# Patient Record
Sex: MALE | Race: WHITE | Employment: FULL TIME | URBAN - METROPOLITAN AREA
[De-identification: names, ages, dates, MRNs, and addresses within clinical notes are randomized per-mention and may not be internally consistent; named-entity substitution may affect disease eponyms.]

---

## 2017-02-21 ENCOUNTER — OFFICE VISIT (OUTPATIENT)
Dept: FAMILY MEDICINE | Facility: CLINIC | Age: 42
End: 2017-02-21
Payer: COMMERCIAL

## 2017-02-21 VITALS
BODY MASS INDEX: 34.58 KG/M2 | TEMPERATURE: 97.8 F | OXYGEN SATURATION: 97 % | WEIGHT: 247 LBS | HEIGHT: 71 IN | DIASTOLIC BLOOD PRESSURE: 81 MMHG | SYSTOLIC BLOOD PRESSURE: 121 MMHG | HEART RATE: 64 BPM

## 2017-02-21 DIAGNOSIS — M21.42 FLAT FEET: ICD-10-CM

## 2017-02-21 DIAGNOSIS — M21.612 BUNION, LEFT: ICD-10-CM

## 2017-02-21 DIAGNOSIS — Z00.00 ROUTINE GENERAL MEDICAL EXAMINATION AT A HEALTH CARE FACILITY: Primary | ICD-10-CM

## 2017-02-21 DIAGNOSIS — Z13.1 SCREENING FOR DIABETES MELLITUS: ICD-10-CM

## 2017-02-21 DIAGNOSIS — Z13.6 CARDIOVASCULAR SCREENING; LDL GOAL LESS THAN 160: ICD-10-CM

## 2017-02-21 DIAGNOSIS — M21.611 BUNION, RIGHT: ICD-10-CM

## 2017-02-21 DIAGNOSIS — M21.41 FLAT FEET: ICD-10-CM

## 2017-02-21 LAB
CHOLEST SERPL-MCNC: 202 MG/DL
GLUCOSE SERPL-MCNC: 93 MG/DL (ref 70–99)
HDLC SERPL-MCNC: 62 MG/DL
LDLC SERPL CALC-MCNC: 126 MG/DL
NONHDLC SERPL-MCNC: 140 MG/DL
TRIGL SERPL-MCNC: 70 MG/DL

## 2017-02-21 PROCEDURE — 99396 PREV VISIT EST AGE 40-64: CPT | Performed by: FAMILY MEDICINE

## 2017-02-21 PROCEDURE — 80061 LIPID PANEL: CPT | Performed by: FAMILY MEDICINE

## 2017-02-21 PROCEDURE — 36415 COLL VENOUS BLD VENIPUNCTURE: CPT | Performed by: FAMILY MEDICINE

## 2017-02-21 PROCEDURE — 82947 ASSAY GLUCOSE BLOOD QUANT: CPT | Performed by: FAMILY MEDICINE

## 2017-02-21 NOTE — PATIENT INSTRUCTIONS
Neuroma of your foot: anti-inflammatories,ice after exercise  You can continue your usual activity.    Health Maintenance Due   Topic Date Due     INFLUENZA VACCINE (SYSTEM ASSIGNED)  09/01/2016

## 2017-02-21 NOTE — PROGRESS NOTES
SUBJECTIVE:     CC: Cristo Dong is an 41 year old male who presents for preventative health visit.   1. Foot pain    Physical   Annual:     Getting at least 3 servings of Calcium per day::  Yes    Bi-annual eye exam::  NO    Dental care twice a year::  Yes    Sleep apnea or symptoms of sleep apnea::  Daytime drowsiness    Diet::  Regular (no restrictions)    Frequency of exercise::  4-5 days/week    Duration of exercise::  30-45 minutes    Taking medications regularly::  Yes    Medication side effects::  Not applicable    Additional concerns today::  YES              Today's PHQ-2 Score:   PHQ-2 ( 1999 Pfizer) 2/21/2017   Q1: Little interest or pleasure in doing things -   Q2: Feeling down, depressed or hopeless -   PHQ-2 Score -   Little interest or pleasure in doing things Not at all   Feeling down, depressed or hopeless Not at all   PHQ-2 Score 0       Abuse: Current or Past(Physical, Sexual or Emotional)- No  Do you feel safe in your environment - Yes    Social History   Substance Use Topics     Smoking status: Never Smoker     Smokeless tobacco: Never Used     Alcohol use 2.0 oz/week      Comment: 4-6 drinks per wk     The patient does not drink >3 drinks per day nor >7 drinks per week.    Last PSA: No results found for: PSA    Recent Labs   Lab Test  04/17/14   0907  03/18/11   1113   CHOL  152  164   HDL  48  50   LDL  84  96   TRIG  101  92   CHOLHDLRATIO  3.2  3.3       Reviewed orders with patient. Reviewed health maintenance and updated orders accordingly - Yes    All Histories reviewed and updated in Epic.  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     Pt was on coumadin for 5 years     Thrombosed hemorrhoids       Past Surgical History   Procedure Laterality Date     C repair cruciate ligament,knee  1991     Right Knee     C nerve graft,hand/foot,one,=<4cm  1990     Nerve Graft - Right Hand     C hand/finger surgery unlisted       RT HAND INDEX FINGER NERVE  DAMAGE       ROS:  C: NEGATIVE for fever, chills, change in weight  I: NEGATIVE for worrisome rashes, moles or lesions  E: NEGATIVE for vision changes or irritation  ENT: NEGATIVE for ear, mouth and throat problems  R: NEGATIVE for significant cough or SOB  CV: NEGATIVE for chest pain, palpitations or peripheral edema  GI: NEGATIVE for nausea, abdominal pain, heartburn, or change in bowel habits   male: negative for dysuria, hematuria, decreased urinary stream, erectile dysfunction, urethral discharge  M: NEGATIVE for significant arthralgias or myalgia  N: NEGATIVE for weakness, dizziness or paresthesias  P: NEGATIVE for changes in mood or affect    BP Readings from Last 3 Encounters:   02/21/17 121/81   09/14/15 124/84   06/30/15 131/85    Wt Readings from Last 3 Encounters:   02/21/17 247 lb (112 kg)   09/14/15 242 lb (109.8 kg)   06/30/15 246 lb 5 oz (111.7 kg)                  Current Outpatient Prescriptions   Medication Sig Dispense Refill     clotrimazole-betamethasone (LOTRISONE) cream Apply topically 2 times daily 30 g 1     ciclopirox 8 % SOLN Externally apply 1 mL topically daily 90 mL 1     ASPIRIN 81 mg. Used to take 1 a day. Now takes 8 a day.       ORDER FOR DME Equipment being ordered: Orthotics, bilateral, flat feet, one pair per year.  Dispense one pair, 1 each 0     Allergies   Allergen Reactions     Codeine GI Disturbance     Recent Labs   Lab Test  06/30/15   0942  04/17/14   0907  03/18/11   1113  03/29/10   1006  07/17/09   1602  03/18/09   0953   LDL   --   84  96  98   --   130*   HDL   --   48  50  48   --   44   TRIG   --   101  92  127   --   133   ALT   --    --   36  29   --   40   CR   --    --    --   0.78   --    --    GFRESTIMATED   --    --    --   >90   --    --    GFRESTBLACK   --    --    --   >90   --    --    POTASSIUM   --    --    --   4.2   --    --    TSH  1.30   --    --    --   1.77   --       OBJECTIVE:     There were no vitals taken for this visit.  EXAM:  GENERAL:  "healthy, alert and no distress  EYES: Eyes grossly normal to inspection, PERRL and conjunctivae and sclerae normal  HENT: ear canals and TM's normal, nose and mouth without ulcers or lesions  NECK: no adenopathy, no asymmetry, masses, or scars and thyroid normal to palpation  RESP: lungs clear to auscultation - no rales, rhonchi or wheezes  CV: regular rate and rhythm, normal S1 S2, no S3 or S4, no murmur, click or rub, no peripheral edema and peripheral pulses strong  ABDOMEN: soft, nontender, no hepatosplenomegaly, no masses and bowel sounds normal  MS: no gross musculoskeletal defects noted, no edema  SKIN: no suspicious lesions or rashes  NEURO: Normal strength and tone, mentation intact and speech normal  PSYCH: mentation appears normal, affect normal/bright  FEET: FEET: bilaterally normal; no swelling, tenderness or skin or vascular lesions.  Sensation is intact except he reports numbness of distal left great toe. Peripheral pulses are palpable, capillary refill brisk. Toenails are normal.     ASSESSMENT/PLAN:     1. Routine general medical examination at a health care facility  [facility]    2. Flat feet  3. Bunion, left  4. Bunion, right  Causing numbness in great toe.  Discussed options (doing nothing, surgery), he will consider      COUNSELING:   See below     reports that he has never smoked. He has never used smokeless tobacco.    Estimated body mass index is 33.99 kg/(m^2) as calculated from the following:    Height as of 6/30/15: 5' 10.75\" (1.797 m).    Weight as of 9/14/15: 242 lb (109.8 kg).   Weight management plan home exercise and diet, recheck 12 months    Counseling Resources:  ATP IV Guidelines  Pooled Cohorts Equation Calculator  FRAX Risk Assessment  ICSI Preventive Guidelines  Dietary Guidelines for Americans, 2010  USDA's MyPlate  ASA Prophylaxis  Lung CA Screening    Keesha Miranda MD  [facility]  Answers for HPI/ROS submitted by the patient on 2/21/2017   PHQ-2 " Depressed: Not at all, Not at all  PHQ-2 Score: 0

## 2017-02-21 NOTE — MR AVS SNAPSHOT
After Visit Summary   2/21/2017    Cristo Dong    MRN: [id number]           Patient Information     Date Of Birth          1975        Visit Information        Provider Department      2/21/2017 8:00 AM Keesha Miranda MD [facility]        Today's Diagnoses     Routine general medical examination at a health care facility    -  1    Flat feet        Bunion, left        Bunion, right          Care Instructions    Neuroma of your foot: anti-inflammatories,ice after exercise  You can continue your usual activity.    Health Maintenance Due   Topic Date Due     INFLUENZA VACCINE (SYSTEM ASSIGNED)  09/01/2016            Follow-ups after your visit        Who to contact     If you have questions or need follow up information about today's clinic visit or your schedule please contact [facility] directly at [phone].  Normal or non-critical lab and imaging results will be communicated to you by MyChart, letter or phone within 4 business days after the clinic has received the results. If you do not hear from us within 7 days, please contact the clinic through MyChart or phone. If you have a critical or abnormal lab result, we will notify you by phone as soon as possible.  Submit refill requests through [employer] or call your pharmacy and they will forward the refill request to us. Please allow 3 business days for your refill to be completed.          Additional Information About Your Visit        MyChart Information     [employer] gives you secure access to your electronic health record. If you see a primary care provider, you can also send messages to your care team and make appointments. If you have questions, please call your primary care clinic.  If you do not have a primary care provider, please call [phone] and they will assist you.        Care EveryWhere ID     This is your Care EveryWhere ID. This could be used by other organizations to access your [place]  "medical records  [id number]        Your Vitals Were     Pulse Temperature Height Pulse Oximetry BMI (Body Mass Index)       64 97.8  F (36.6  C) (Tympanic) 5' 10.5\" (1.791 m) 97% 34.94 kg/m2        Blood Pressure from Last 3 Encounters:   02/21/17 121/81   09/14/15 124/84   06/30/15 131/85    Weight from Last 3 Encounters:   02/21/17 247 lb (112 kg)   09/14/15 242 lb (109.8 kg)   06/30/15 246 lb 5 oz (111.7 kg)              Today, you had the following     No orders found for display       Primary Care Provider Office Phone # Fax #    Keesha Miranda [phone]       [facility] [address]  [facility] [address]        Thank you!     Thank you for choosing [facility]  for your care. Our goal is always to provide you with excellent care. Hearing back from our patients is one way we can continue to improve our services. Please take a few minutes to complete the written survey that you may receive in the mail after your visit with us. Thank you!             Your Updated Medication List - Protect others around you: Learn how to safely use, store and throw away your medicines at www.disposemymeds.org.          This list is accurate as of: 2/21/17  8:21 AM.  Always use your most recent med list.                   Brand Name Dispense Instructions for use    ASPIRIN      81 mg. Used to take 1 a day. Now takes 8 a day.       ciclopirox 8 % Soln     90 mL    Externally apply 1 mL topically daily       clotrimazole-betamethasone cream    LOTRISONE    30 g    Apply topically 2 times daily       order for DME     1 each    Equipment being ordered: Orthotics, bilateral, flat feet, one pair per year. Dispense one pair,         "

## 2017-02-21 NOTE — NURSING NOTE
"Chief Complaint   Patient presents with     Physical       Initial /81 (BP Location: Right arm, Patient Position: Chair, Cuff Size: Adult Large)  Pulse 64  Temp 97.8  F (36.6  C) (Tympanic)  Ht 5' 10.5\" (1.791 m)  Wt 247 lb (112 kg)  SpO2 97%  BMI 34.94 kg/m2 Estimated body mass index is 34.94 kg/(m^2) as calculated from the following:    Height as of this encounter: 5' 10.5\" (1.791 m).    Weight as of this encounter: 247 lb (112 kg).  Medication Reconciliation: complete     Valorie Patterson MA    "

## 2017-02-23 NOTE — PROGRESS NOTES
Hello!  It was a pleasure to see you in clinic!  Thank you for getting labs done.     Your glucose was normal, which is great, you do not have diabetes. Hurray!    Your cholesterol looks pretty good.  Your total cholesterol and LDL, or bad cholesterol, are higher than in previous years, but still pretty much at goal. Ideally, total cholesterol should be less than 200 and yours is at 202. Your LDL is 126, an increase form 84 last year, but still at goal of less than 160. Your HDL is very good, anything over 40 is good news!      TRIGLYCERIDES: Desirable is less than 150.  Yours are very low, so you must not eat a lot of sugars and starches, good job!    As you may know, abnormal cholesterol is one factor that increases your risk for heart disease and stroke. You can improve your cholesterol by controlling the amount and type of fat you eat and by increasing your daily activity level.    Here are some ways to improve your nutrition (adapted from the American Academy of Family Practice handout):  Eat less fat (especially butter, Crisco and other saturated fats)  Buy lean cuts of meat; reduce your portions of red meat or substitute poultry or fish, or avoid meat altogether  Use skim milk and low-fat dairy products  Eat no more than 4 egg yolks per week  Avoid fried or fast foods that are high in fat  Eat more fruits and vegetables  I hope the exercise plan goes well!  All activity is worth it!    Sincerely,  Dr. Keesha Miranda MD  2/22/2017

## 2017-05-26 ENCOUNTER — OFFICE VISIT (OUTPATIENT)
Dept: FAMILY MEDICINE | Facility: CLINIC | Age: 42
End: 2017-05-26
Payer: COMMERCIAL

## 2017-05-26 ENCOUNTER — TRANSFERRED RECORDS (OUTPATIENT)
Dept: HEALTH INFORMATION MANAGEMENT | Facility: CLINIC | Age: 42
End: 2017-05-26

## 2017-05-26 VITALS
RESPIRATION RATE: 16 BRPM | BODY MASS INDEX: 34.4 KG/M2 | HEIGHT: 71 IN | TEMPERATURE: 98.8 F | OXYGEN SATURATION: 98 % | DIASTOLIC BLOOD PRESSURE: 82 MMHG | WEIGHT: 245.75 LBS | SYSTOLIC BLOOD PRESSURE: 138 MMHG | HEART RATE: 68 BPM

## 2017-05-26 DIAGNOSIS — D22.9 ATYPICAL MOLE: ICD-10-CM

## 2017-05-26 DIAGNOSIS — M79.604 RIGHT LEG PAIN: ICD-10-CM

## 2017-05-26 DIAGNOSIS — I26.99 PULMONARY EMBOLISM AND INFARCTION (H): Primary | ICD-10-CM

## 2017-05-26 PROCEDURE — 99214 OFFICE O/P EST MOD 30 MIN: CPT | Performed by: FAMILY MEDICINE

## 2017-05-26 RX ORDER — ASPIRIN 325 MG
325 TABLET, DELAYED RELEASE (ENTERIC COATED) ORAL DAILY
COMMUNITY
Start: 2017-05-26 | End: 2017-05-30

## 2017-05-26 NOTE — MR AVS SNAPSHOT
After Visit Summary   5/26/2017    Cristo Dong    MRN: [id number]           Patient Information     Date Of Birth          1975        Visit Information        Provider Department      5/26/2017 11:40 Gabriel Mckeon MD [facility]        Today's Diagnoses     Atypical mole    -  1    Right leg pain           Follow-ups after your visit        Additional Services     DERMATOLOGY REFERRAL       Your provider has referred you to: FMG: [facility] [phone]   [email or web address]/  FMG: [facility] [place] [phone]   [email or web address].[email or web address]/[email or web address]/  UMP: Dermatology [facility] [phone]   http://[email or web address]/dermatology-clinic/  UMP: [facility] [place] [phone]   http://www.[facility]/vpsle-ernlm-jdzdceh-[employer]/  FHN: [facility] [phone]   [email or web address]  FHN: [facility] [place] [phone]   [email or web address]  FHN: [facility] [place] [phone]   [email or web address]  FHN: [facility] [place] [phone]   [email or web address]  FHN: [facility] [place] [phone]   [email or web address]  FHN: [facility] [place] [phone]   http://[email or web address]  FHN:  Dermatologist - [place] [phone]   [email or web address]  [facility] [place] [phone]   [email or web address]  [facility] [place] [phone]   [email or web address]  [facility] [phone]   [email or web address]  [facility] [phone]   [email or web address]  [place] [facility]  [facility] [phone]   [email or web address]  [facility] [place] [phone]   http://[email or web address]    Please be aware that coverage of these services is subject to the terms and limitations of your health insurance plan.  Call member services at your health plan with any benefit or coverage questions.      Please bring the following with you to your appointment:    (1) Any X-Rays, CTs or MRIs which have been performed.  Contact the facility where they were done to arrange for  prior to your scheduled appointment.  Any new CT, MRI or other procedures ordered by your specialist must be performed at a [place] facility or coordinated by your clinic's referral office.  (2) List of current medications  (3) This referral request   (4) Any documents/labs given to you for this referral                  Who to contact     If you have questions or need follow up information about today's clinic visit or your schedule please contact [facility] directly at [phone].  Normal or non-critical lab and imaging results will be communicated to you by [employer], letter or phone within 4 business days after the clinic has received the results. If you do not hear from us within 7 days, please contact the clinic through [employer] or phone. If you have a critical or abnormal lab result, we will notify you by phone as soon as possible.  Submit refill requests through [employer] or call your pharmacy and they will forward the refill request to us. Please allow 3 business days for your refill to be completed.          Additional Information About Your Visit        [employer] Information     [employer] gives you secure access to your electronic health record. If you see a primary care provider, you can also send messages to your care team and make appointments. If you have questions, please call your primary care clinic.  If you do not have a  "primary care provider, please call [phone] and they will assist you.        Care EveryWhere ID     This is your Care EveryWhere ID. This could be used by other organizations to access your [place] medical records  [id number]        Your Vitals Were     Pulse Temperature Respirations Height Pulse Oximetry BMI (Body Mass Index)    68 98.8  F (37.1  C) (Oral) 16 5' 10.5\" (1.791 m) 98% 34.76 kg/m2       Blood Pressure from Last 3 Encounters:   05/26/17 138/82   02/21/17 121/81   09/14/15 124/84    Weight from Last 3 Encounters:   05/26/17 245 lb 12 oz (111.5 kg)   02/21/17 247 lb (112 kg)   09/14/15 242 lb (109.8 kg)              We Performed the Following     DERMATOLOGY REFERRAL        Primary Care Provider Office Phone # Fax #    Keesha Miranda [phone]       [facility] [address] [facility] [address]        Thank you!     Thank you for choosing [facility]  for your care. Our goal is always to provide you with excellent care. Hearing back from our patients is one way we can continue to improve our services. Please take a few minutes to complete the written survey that you may receive in the mail after your visit with us. Thank you!             Your Updated Medication List - Protect others around you: Learn how to safely use, store and throw away your medicines at www.disposemymeds.org.          This list is accurate as of: 5/26/17 11:57 AM.  Always use your most recent med list.                   Brand Name Dispense Instructions for use    * ASPIRIN      81 mg. Used to take 1 a day. Now takes 8 a day.       * aspirin 325 MG EC tablet      Take 1 tablet (325 mg) by mouth daily       ciclopirox 8 % Soln     90 mL    Externally apply 1 mL topically daily       clotrimazole-betamethasone cream    LOTRISONE    30 g    Apply topically 2 times daily       order for DME     1 each    Equipment being ordered: Orthotics, bilateral, flat feet, one pair per " year. Dispense one pair,       * Notice:  This list has 2 medication(s) that are the same as other medications prescribed for you. Read the directions carefully, and ask your doctor or other care provider to review them with you.

## 2017-05-26 NOTE — PROGRESS NOTES
SUBJECTIVE:                                                    Cristo Dong is a 42 year old male who presents to clinic today for the following health issues:      Low oxygen levels      Duration: 4-5 days    Description (location/character/radiation): climbing upstairs, gets out of breath easily, past few days has had readings from 91-93 for oxygen     Intensity:  mild    Accompanying signs and symptoms: hard time catching breath, feels O2 is lower when lying down, congested    History (similar episodes/previous evaluation): had PE in 2001    Precipitating or alleviating factors: runs and exercise    Therapies tried and outcome: None     2001 - had PE. Was hospitalized.   Sensitive to his body feels.  Keeps himself in good shape.   Running 6-8 miiles per week.  Had plantar fascitis and did not run since last fall.  Trying to get back to exercise.   Now some short of breath with activity.   Has home oxymetry - reading 92-93  Currently using 325mg aspirin as blood thinner.       Also wondering about possible damage from PE.     Problem list and histories reviewed & adjusted, as indicated.  Additional history: as documented    Labs reviewed in EPIC    Reviewed and updated as needed this visit by clinical staff       Reviewed and updated as needed this visit by Provider      Social History     Social History     Marital status:      Spouse name: TODD     Number of children: 2     Years of education: N/A     Occupational History      [employer]     Social History Main Topics     Smoking status: Never Smoker     Smokeless tobacco: Never Used     Alcohol use 2.0 oz/week      Comment: 4-6 drinks per wk     Drug use: No     Sexual activity: Yes     Partners: Female     Birth control/ protection: Condom     Other Topics Concern      Service No     Blood Transfusions No     Exercise No     Seat Belt Yes     Self-Exams Yes     Parent/Sibling W/ Cabg, Mi Or Angioplasty Before 65f 55m? No     Social  "History Narrative    2009    Balanced Diet - Yes    Osteoporosis Preventative measures-  Dairy servings per day:2- 3 SERVINGS DAILY    Regular Exercise -  Yes Describe RUN 6-10 MILES WEEKLY AND WEIGHT TRAINING    Dental Exam up - YES - Date: 2008    Eye Exam - YES - Date: 12/07    Self Testicular Exam -  Yes    Do you have any concerns about STD's -  No    Abuse: Current or Past (Physical, Sexual or Emotional)- No    Do you feel safe in your environment - Yes    Guns stored in the home - Yes, LOCKED AWAY    Sunscreen used - Yes    Seatbelts used - Yes    Lipids - YES - Date: 8-27-07    Glucose -  NO    Colon Cancer Screening - No    Hemoccults - YES - Date: UNKNOWN DATE    PSA - NO    Digital Rectal Exam - YES - Date: 2007    Immunizations reviewed and up to date - No, PT UNSURE OF LAST TD    Tala Kaiser MA                 Allergies   Allergen Reactions     Codeine GI Disturbance     Patient Active Problem List   Diagnosis     Pulmonary embolism and infarction (H)     CARDIOVASCULAR SCREENING; LDL GOAL LESS THAN 160     Flat feet     Varicosities of leg     External hemorrhoids     Bunion, left     Bunion, right     Reviewed medications, social history and  past medical and surgical history.    Review of system: for general, respiratory, CVS, GI and psychiatry negative except for noted above.     EXAM:  /82 (Cuff Size: Adult Large)  Pulse 68  Temp 98.8  F (37.1  C) (Oral)  Resp 16  Ht 5' 10.5\" (1.791 m)  Wt 245 lb 12 oz (111.5 kg)  SpO2 98%  BMI 34.76 kg/m2  Constitutional: healthy, alert and no distress   Psychiatric: mentation appears normal and affect normal/bright  Cardiovascular: RRR. No murmurs,  Respiratory: negative, Lungs clear. No crackles or wheezing. No tachypnea.   Right leg - pain mostly around his foot.  Skin - upper back in middle around 1 cm irregular mole, multiple other moles.    ASSESSMENT / PLAN:   (I26.99) Pulmonary embolism and infarction (H)  (primary encounter " diagnosis)  Comment: history. Currently aspirin as blood thinner.   Plan: his spo2 is certainly on lower end for otherwise healthy person for his age at home but right now it is normal.   Due to his history of PE, recurrent PE can not be ruled out. He is keeping a close eye on his SPO2 and I encouraged him to do so for long weekend and if that is getting worse or not improving, he should have CT chest to rule out repeat PE. He agreed.     (M79.604) Right leg pain  Comment:     Plan: mostly foot pain but DVT can not be ruled out. See above for plan.    (D22.9) Atypical mole  Comment:  Atypical on his back. See derm.   Plan: DERMATOLOGY REFERRAL

## 2017-05-29 ENCOUNTER — TELEPHONE (OUTPATIENT)
Dept: FAMILY MEDICINE | Facility: CLINIC | Age: 42
End: 2017-05-29

## 2017-05-30 ENCOUNTER — TELEPHONE (OUTPATIENT)
Dept: FAMILY MEDICINE | Facility: CLINIC | Age: 42
End: 2017-05-30

## 2017-05-30 ENCOUNTER — MYC MEDICAL ADVICE (OUTPATIENT)
Dept: FAMILY MEDICINE | Facility: CLINIC | Age: 42
End: 2017-05-30

## 2017-05-30 DIAGNOSIS — I82.431 ACUTE DEEP VEIN THROMBOSIS (DVT) OF POPLITEAL VEIN OF RIGHT LOWER EXTREMITY (H): Primary | ICD-10-CM

## 2017-05-30 DIAGNOSIS — I82.491 ACUTE DEEP VEIN THROMBOSIS (DVT) OF OTHER SPECIFIED VEIN OF RIGHT LOWER EXTREMITY (H): Primary | ICD-10-CM

## 2017-05-30 DIAGNOSIS — I26.99 OTHER ACUTE PULMONARY EMBOLISM WITHOUT ACUTE COR PULMONALE (H): ICD-10-CM

## 2017-05-30 DIAGNOSIS — I82.441 ACUTE DEEP VEIN THROMBOSIS (DVT) OF TIBIAL VEIN OF RIGHT LOWER EXTREMITY (H): ICD-10-CM

## 2017-05-30 NOTE — TELEPHONE ENCOUNTER
Dr. Miranda --     Pt called back.   Right leg clot confirmed Friday last week    Pt is no longer taking aspirin. He is taking Xarelto 30  mg daily for the next 21 days. He was on heparin in hospital.     He is staying hydrated.    Flying to [place] -- leaving Friday    Please review and order imaging if you agree.    Thanks!  Colette Mauricio, BRITTN, RN  [facility]

## 2017-05-30 NOTE — TELEPHONE ENCOUNTER
No available appointments with Dr. Miranda this week.    Reception-Please call patient, offer appt with PCP next week (same day hold is fine).  If patient needs to be seen this week, please offer appointment with any provider in clinic (same day hold is fine).    Thank you!  BRITT GallegoN, RN

## 2017-05-30 NOTE — TELEPHONE ENCOUNTER
Huddled with CS.  OK to cancel clinic appt.  She will look at chart. She will order the u/s.    Asked pt-  Where are you flying to?  Are you taking the asa daily? What dose?    Important to stay hydrated and take asa daily.  GIOVANNI Fagan

## 2017-05-30 NOTE — TELEPHONE ENCOUNTER
I am more than happy to order US for him but if he had US done recently and if they found blood clot on his leg already via doing that test,  repeat US may be too early and may not be helpful.  He should be encouraged to have hospital follow up with PCP also.

## 2017-05-30 NOTE — TELEPHONE ENCOUNTER
PT was seen at [facility] and admitted for Fri and Sat.  Pt had PE. He wanted Dr. Baldwin to know that. 5/26/17 appt with SK.  He also has a blood clot in his right leg by knee.  Pt wants an u/s of his leg ordered.  He wants the u/s done on Thur.  He is flying on Friday.    Pt wanted to cancel his hosp f/u appt for today. It was with MP.  I had not done this yet.    Pt wanted the U/s ordered without being seen.  Routing to pcp and SK.  GIOVANNI Fagan    Call pt on cell phone. OK to leave a detailed message.   X Size Of Lesion In Cm (Optional): 0 Detail Level: Generalized Detail Level: Detailed Body Location Override (Optional - Billing Will Still Be Based On Selected Body Map Location If Applicable): Left superior medial upper back

## 2017-05-30 NOTE — TELEPHONE ENCOUNTER
ASSESSMENT / PLAN:  (I82.491) Acute deep vein thrombosis (DVT) of other specified vein of right lower extremity (H)  (primary encounter diagnosis)  Comment: Thank Gracia!  I put order in.  Please ask Cristo to call [facility] at [phone] to schedule his ultrasound.    Locations:   [facility], [address] [place] [address]       Plan: US Lower Extremity Venous Duplex Right          Sincerely,  Dr. Keesha Miranda MD  5/30/2017

## 2017-05-30 NOTE — TELEPHONE ENCOUNTER
Patient needs hospital follow up appointment as soon as possible for PE; blood clot started in leg and traveled to lungs. Requesting to see Pcp. Please advise.       Thank You,    Central Scheduler  Unique CASTANON

## 2017-05-31 NOTE — TELEPHONE ENCOUNTER
I called and gave Cristo phone number for radiology scheduling.  He said he already read a [place] that told him the order was placed.    Celia Espinoza RN

## 2017-06-01 ENCOUNTER — MYC MEDICAL ADVICE (OUTPATIENT)
Dept: FAMILY MEDICINE | Facility: CLINIC | Age: 42
End: 2017-06-01

## 2017-06-01 ENCOUNTER — HOSPITAL ENCOUNTER (OUTPATIENT)
Dept: ULTRASOUND IMAGING | Facility: CLINIC | Age: 42
Discharge: HOME | End: 2017-06-01
Attending: FAMILY MEDICINE | Admitting: FAMILY MEDICINE
Payer: COMMERCIAL

## 2017-06-01 DIAGNOSIS — I82.491 ACUTE DEEP VEIN THROMBOSIS (DVT) OF OTHER SPECIFIED VEIN OF RIGHT LOWER EXTREMITY (H): ICD-10-CM

## 2017-06-01 PROCEDURE — 93971 EXTREMITY STUDY: CPT | Mod: RT

## 2017-06-01 NOTE — PROGRESS NOTES
Cristo Eldridge, your two DVT in the right leg are still present. Expert opinion is somewhat divided on safety of travel; some feel it's fine as long as the flight is short, you stay hydrated, you're on a blood thinner, and you keep moving during the flight. (If anyone should be bumped to 1st class, it should be you for more leg room!). Others say the risk for PE is too high while clot is still present.     Please check in with your vascular surgeon as well to see what they'd say. My gut sense is that you're risk is reasonably low for PE reoccurence since you're on a blood thinner now, but I don't know if there are some more precautions that would be helpful (for instance, should you take aspirin as well prior to the flight?)  Also obviously be very aware of your breathing, leg swelling, etc, and don't hesitate to get medical care right away if you get short of breath, chest pain, increased leg swelling or pain.    Good luck and let me know how I can help!  Terrible timing for this, eh?    Sincerely,  Dr. Keesha Miranda MD  6/1/2017

## 2017-06-12 ENCOUNTER — MYC MEDICAL ADVICE (OUTPATIENT)
Dept: FAMILY MEDICINE | Facility: CLINIC | Age: 42
End: 2017-06-12

## 2017-06-12 ENCOUNTER — OFFICE VISIT (OUTPATIENT)
Dept: FAMILY MEDICINE | Facility: CLINIC | Age: 42
End: 2017-06-12
Payer: COMMERCIAL

## 2017-06-12 VITALS
TEMPERATURE: 99 F | HEART RATE: 75 BPM | DIASTOLIC BLOOD PRESSURE: 86 MMHG | HEIGHT: 71 IN | WEIGHT: 246 LBS | OXYGEN SATURATION: 98 % | SYSTOLIC BLOOD PRESSURE: 125 MMHG | RESPIRATION RATE: 16 BRPM | BODY MASS INDEX: 34.44 KG/M2

## 2017-06-12 DIAGNOSIS — Z79.01 CHRONIC ANTICOAGULATION: ICD-10-CM

## 2017-06-12 DIAGNOSIS — Z86.711 HISTORY OF PULMONARY EMBOLISM: ICD-10-CM

## 2017-06-12 DIAGNOSIS — Z86.718 HISTORY OF DEEP VENOUS THROMBOSIS: ICD-10-CM
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
## 2017-06-12 DIAGNOSIS — J06.9 VIRAL URI: ICD-10-CM

## 2017-06-12 DIAGNOSIS — T63.484A LOCAL REACTION TO INSECT STING, UNDETERMINED INTENT, INITIAL ENCOUNTER: ICD-10-CM

## 2017-06-12 PROCEDURE — 99214 OFFICE O/P EST MOD 30 MIN: CPT | Performed by: FAMILY MEDICINE

## 2017-06-12 RX ORDER — CEPHALEXIN 500 MG/1
500 CAPSULE ORAL 2 TIMES DAILY
Qty: 20 CAPSULE | Refills: 0 | Status: SHIPPED | OUTPATIENT
Start: 2017-06-12 | End: 2017-12-04

## 2017-06-12 NOTE — PROGRESS NOTES
SUBJECTIVE:                                                    Cristo Dong is a 42 year old male who presents to clinic today for the following health issues:    Insect Bite      Duration: x Friday/Saturday     Description (location/character/radiation): I have an insect bite that I would like to get checked out before I leave for a week long trip. I was in [place] and now have about an inch in diameter red bite david on my right arm.    Right fore arm     Patient was at [place]    Throat is a little sore     Cough     Patient had a PE about 2 weeks ago     Accompanying signs and symptoms: history of PE ; patient is on xarelto     History (similar episodes/previous evaluation): None    Therapies tried and outcome: None     Did get an insect bite in [place] that has got somewhat red in about a inch in diameter with a little white center and will be leaving tomorrow for a [facility] trip for a week.Right elbow area cubital area bite. Usually does not have a big reaction to mosquito bites .     Also report congestion and mild cough 1 day. Has his own oximeter.No fever, Throat mildly sore. No CP or trouble breathing or palpitations. Very sensitive to sat levels so checked with home machine and saw  Mild  low sat yesterday at home not currently.     Reports DVT and PE in 2001, Never found out cause, So bad did tpa . unknown cause at that time told thought from plane ride to [place] 6 months prior , was on coumadin short while. Then off it  And not anticoagulation since. Told no coagulation issues like factor 5 etc though reports Father has had it too  And suspects son too as daughter tooth went in sons lip and he hardly bled at all. Seen by hematology too then . 2 weeks ago started noted lower SATs on oximeter he has at home. Nena to [place] and had work up and reports DVT popliteal and tibial vein on right leg and extensive B/L acute pulmonary thromboembolism. Reports had a  knee sprain on right ( no ACL knee shifted and  swelled up ) prior to that and feels sure that's why he got clot and PE. [facility] to [facility]/28 heparin[facility] and transitioned to xarelto initially 15 mg twice a day for 21 days and currently reports on day 16 of 21 day 30 mg xarelto then to go to 20 mg dose and has script at home. He Flew to [place] and back after a great trip to [place] and  averaged about 16,000 steps a day and did just fine. Reports will see dr graham and hematology on return form his trip    Problem list and histories reviewed & adjusted, as indicated.  Additional history: as documented    Patient Active Problem List   Diagnosis     Pulmonary embolism and infarction (H)     Flat feet     Varicosities of leg     External hemorrhoids     Bunion, left     Bunion, right     Acute deep vein thrombosis (DVT) of tibial vein of right lower extremity (H)     Acute deep vein thrombosis (DVT) of popliteal vein of right lower extremity (H)     Other acute pulmonary embolism without acute cor pulmonale (H)     Past Surgical History:   Procedure Laterality Date     C HAND/FINGER SURGERY UNLISTED      RT HAND INDEX FINGER NERVE DAMAGE     C NERVE GRAFT,HAND/FOOT,ONE,=<4CM  1990    Nerve Graft - Right Hand     C REPAIR CRUCIATE LIGAMENT,KNEE  1991    Right Knee       Social History   Substance Use Topics     Smoking status: Never Smoker     Smokeless tobacco: Never Used     Alcohol use 2.0 oz/week      Comment: 4-6 drinks per wk     Family History   Problem Relation Age of Onset     DIABETES Paternal Grandmother      GASTROINTESTINAL DISEASE Paternal Grandfather      liver failure     Genitourinary Problems Paternal Grandfather      kidney failure     Neurologic Disorder Maternal Grandmother      Alzheimer's     Blood Disease Father      recurrent DVT (twice)     Sleep Apnea Father      mother         Current Outpatient Prescriptions   Medication Sig Dispense Refill     cephALEXin (KEFLEX) 500 MG capsule Take 1 capsule (500 mg) by mouth 2 times  daily 20 capsule 0     rivaroxaban ANTICOAGULANT (XARELTO) 20 MG TABS tablet Take 1 tablet (20 mg) by mouth daily (with dinner)       clotrimazole-betamethasone (LOTRISONE) cream Apply topically 2 times daily 30 g 1     ciclopirox 8 % SOLN Externally apply 1 mL topically daily 90 mL 1     ORDER FOR DME Equipment being ordered: Orthotics, bilateral, flat feet, one pair per year.  Dispense one pair, 1 each 0     Allergies   Allergen Reactions     Codeine GI Disturbance     Recent Labs   Lab Test  02/21/17   0810  06/30/15   0942  04/17/14   0907  03/18/11   1113  03/29/10   1006   07/17/09   1602   LDL  126*   --   84  96  98   < >   --    HDL  62   --   48  50  48   < >   --    TRIG  70   --   101  92  127   < >   --    ALT   --    --    --   36  29   --    --    CR   --    --    --    --   0.78   --    --    GFRESTIMATED   --    --    --    --   >90   --    --    GFRESTBLACK   --    --    --    --   >90   --    --    POTASSIUM   --    --    --    --   4.2   --    --    TSH   --   1.30   --    --    --    --   1.77    < > = values in this interval not displayed.      BP Readings from Last 3 Encounters:   06/12/17 125/86   05/26/17 138/82   02/21/17 121/81    Wt Readings from Last 3 Encounters:   06/12/17 246 lb (111.6 kg)   05/26/17 245 lb 12 oz (111.5 kg)   02/21/17 247 lb (112 kg)                  Labs reviewed in EPIC    Reviewed and updated as needed this visit by clinical staff       Reviewed and updated as needed this visit by Provider         ROS:  C: NEGATIVE for fever, chills, change in weight  I: NEGATIVE for worrisome rashes, moles or lesions  E: NEGATIVE for vision changes or irritation  E/M: NEGATIVE for ear, mouth and throat problems  R: NEGATIVE for significant cough or SOB  CV: NEGATIVE for chest pain, palpitations or peripheral edema  GI: NEGATIVE for nausea, abdominal pain, heartburn, or change in bowel habits  : NEGATIVE for frequency, dysuria, or hematuria  M: NEGATIVE for significant  "arthralgias or myalgia  N: NEGATIVE for weakness, dizziness or paresthesias  E: NEGATIVE for temperature intolerance, skin/hair changes  H: NEGATIVE for bleeding problems  P: NEGATIVE for changes in mood or affect    OBJECTIVE:                                                    /86  Pulse 75  Temp 99  F (37.2  C) (Oral)  Resp 16  Ht 5' 10.5\" (1.791 m)  Wt 246 lb (111.6 kg)  SpO2 98%  BMI 34.8 kg/m2  Body mass index is 34.8 kg/(m^2).  GENERAL: healthy, alert and no distress  EYES: Eyes grossly normal to inspection, PERRL and conjunctivae and sclerae normal  HENT: ear canals and TM's normal, nose and mouth without ulcers or lesions, clear post nasal drainage.  NECK: no adenopathy, no asymmetry, masses, or scars and thyroid normal to palpation  RESP: lungs clear to auscultation - no rales, rhonchi or wheezes  CV: regular rate and rhythm, normal S1 S2, no S3 or S4, no murmur, click or rub, no peripheral edema and peripheral pulses strong  ABDOMEN: soft, non tender, no hepatosplenomegaly, no masses and bowel sounds normal  MS: no gross musculoskeletal defects noted, no edema  SKIN: 3 cm erythema raised non blanching like a local reaction around central punctum that is got a dot of white , non tender non fluctuance. Also similar smaller local reaction without punctum or white spot lateral to it in medial  right antecubital area.   Compression sock right leg   NEURO: Normal strength and tone, mentation intact and speech normal  PSYCH: mentation appears normal, affect normal/bright    Diagnostic Test Results:  none      ASSESSMENT/PLAN:                                                      1. Insect bite, initial encounter  Hx of prior DVT and PE in [place] unknown cause with recent new DVT popliteal and tibial vein and extensive B/L PE on anticoagulation with xarelto after heparinization in the [facility] 2 weeks ago with no know genetic predisposition yet family hx of VTE ,  Hx of varicose veins, bunions and " flat feet, prior knee surgery , hand graft surgery, under care of PCP Dr Graham, seen by Dr Baldwin 5/26 for low SATs , work up in [facility] found new DVT and PE admitted in [facility] 2 days and started on xarelto 15 mg bid for 21 days .Pt is no longer taking aspirin. He is taking Xarelto 30 mg total daily for first  21 days. Flew to [place] and back after a great trip to [place] and  averaged about 16,000 steps a day and did just fine now on day 16 of the 30 mg a day of Xarelto so felt the clot was breaking up and reported oxygen levels have been in the 95-97 range consistently at home. Did get an insect bite in [place] that has got somewhat red in about a inch in diameter with a little white center medial right antecubital area and will be leaving tomorrow for a [place] trip for a week. Also having some congestion and mild cough and sore throat with post nasal drip since last night.   Local reaction to likely mosquito bite. Benadryl cream to area and zyrtec to help decrease swelling and redness. Over the counter  Antibiotic to area. Keflex given if not better as going on a trip. Monitor for worsening. Increased bleeding swelling, pain in legs and bite site as antibiotics may interact with blood thinner. Continue xarelto as recommended. Suspect viral upper respiratory infection. Follow up vascular/ hematology  as planned. Follow up with primary dr graham on return from trip. If gets worse in any way go to the ER  - cephALEXin (KEFLEX) 500 MG capsule; Take 1 capsule (500 mg) by mouth 2 times daily  Dispense: 20 capsule; Refill: 0    2. Local reaction to insect sting, undetermined intent, initial encounter  - cephALEXin (KEFLEX) 500 MG capsule; Take 1 capsule (500 mg) by mouth 2 times daily  Dispense: 20 capsule; Refill: 0    3. Chronic anticoagulation  On Xarelto 15 mg bid Day 16 / 21 then 20 mg daily life long.     4. History of deep venous thrombosis  2 weeks ago . recurrent unknown cause. Reports prior negative coag  / genetic testing in 2001. recommend return to hematology to get evaluated for cause. On xarelto 15 mg bid Day 16 / 21 then 20 mg daily life long.       5. History of pulmonary embolism  2 weeks ago recurrent unknown cause. Reports prior negative coag / genetic testing in 2001. Recommend return to hematology to get evaluated for cause. On xarelto 15 mg bid Day 16 / 21 then 20 mg daily life long.     6. Viral URI  Symptomatic care. Go to closest Er where ever her is if worse, stay well hydrated, move around in plane cabin. wearing compression socks on right. Advised to wear on left as well during travel.       See Patient Instructions  Patient Instructions   Local reaction to likely mosquito bite   Benadryl cream to area nad zyrtec to help decrease swelling and redness  Over the counter  Antibiotic to area   Keflex given if not better   Monitor for worsening. Increased bleeding swelling, pain in legs and bite site as antibiotics may interact with blood thinner  Continue xarelto as recommended  Suspect viral upper respiratory infection   Follow up vascular/ hematology  as planned  Follow up with primary dr graham on return from trip  If gets worse in any way go to the ER      Roxanna Heller MD  [facility]

## 2017-06-12 NOTE — PATIENT INSTRUCTIONS
Local reaction to likely mosquito bite   Benadryl cream to area nad zyrtec to help decrease swelling and redness  Over the counter  Antibiotic to area   Keflex given if not better   Monitor for worsening. Increased bleeding swelling, pain in legs and bite site as antibiotics may interact with blood thinner  Continue xarelto as recommended  Suspect viral upper respiratory infection   Follow up vascular/ hematology  as planned  Follow up with primary dr graham on return from trip  If gets worse in any way go to the ER

## 2017-06-12 NOTE — NURSING NOTE
"Chief Complaint   Patient presents with     Insect Bites     Initial /86  Pulse 75  Temp 99  F (37.2  C) (Oral)  Resp 16  Ht 5' 10.5\" (1.791 m)  Wt 246 lb (111.6 kg)  SpO2 98%  BMI 34.8 kg/m2 Estimated body mass index is 34.8 kg/(m^2) as calculated from the following:    Height as of this encounter: 5' 10.5\" (1.791 m).    Weight as of this encounter: 246 lb (111.6 kg)..  BP completed using cuff size: regina Cosby MA   "

## 2017-06-12 NOTE — MR AVS SNAPSHOT
After Visit Summary   6/12/2017    Cristo Dong    MRN: [id number]           Patient Information     Date Of Birth          1975        Visit Information        Provider Department      6/12/2017 4:00 PM Roxanna Heller MD [facility]        Today's Diagnoses     Insect bite, initial encounter    -  1    Local reaction to insect sting, undetermined intent, initial encounter        Chronic anticoagulation        History of deep venous thrombosis        History of pulmonary embolism        Viral URI          Care Instructions    Local reaction to likely mosquito bite   Benadryl cream to area nad zyrtec to help decrease swelling and redness  Over the counter  Antibiotic to area   Keflex given if not better   Monitor for worsening. Increased bleeding swelling, pain in legs and bite site as antibiotics may interact with blood thinner  Continue xarelto as recommended  Suspect viral upper respiratory infection   Follow up vascular/ hematology  as planned  Follow up with primary dr graham on return from trip  If gets worse in any way go to the ER          Follow-ups after your visit        Who to contact     If you have questions or need follow up information about today's clinic visit or your schedule please contact [facility] directly at [phone].  Normal or non-critical lab and imaging results will be communicated to you by Reebeehart, letter or phone within 4 business days after the clinic has received the results. If you do not hear from us within 7 days, please contact the clinic through Reebeehart or phone. If you have a critical or abnormal lab result, we will notify you by phone as soon as possible.  Submit refill requests through Intellinote or call your pharmacy and they will forward the refill request to us. Please allow 3 business days for your refill to be completed.          Additional Information About Your Visit        Reebeehart Information     Intellinote gives you secure  "access to your electronic health record. If you see a primary care provider, you can also send messages to your care team and make appointments. If you have questions, please call your primary care clinic.  If you do not have a primary care provider, please call [phone] and they will assist you.        Care EveryWhere ID     This is your Care EveryWhere ID. This could be used by other organizations to access your [facility] medical records  [id number]        Your Vitals Were     Pulse Temperature Respirations Height Pulse Oximetry BMI (Body Mass Index)    75 99  F (37.2  C) (Oral) 16 5' 10.5\" (1.791 m) 98% 34.8 kg/m2       Blood Pressure from Last 3 Encounters:   06/12/17 125/86   05/26/17 138/82   02/21/17 121/81    Weight from Last 3 Encounters:   06/12/17 246 lb (111.6 kg)   05/26/17 245 lb 12 oz (111.5 kg)   02/21/17 247 lb (112 kg)              Today, you had the following     No orders found for display         Today's Medication Changes          These changes are accurate as of: 6/12/17  4:28 PM.  If you have any questions, ask your nurse or doctor.               Start taking these medicines.        Dose/Directions    cephALEXin 500 MG capsule   Commonly known as:  KEFLEX   Used for:  Insect bite, initial encounter, Local reaction to insect sting, undetermined intent, initial encounter   Started by:  Roxanna Heller MD        Dose:  500 mg   Take 1 capsule (500 mg) by mouth 2 times daily   Quantity:  20 capsule   Refills:  0            Where to get your medicines      These medications were sent to [facility] [address] [place] [address] AT [address] [address]    Hours:  24-hours Phone:  [phone]     cephALEXin 500 MG capsule                Primary Care Provider Office Phone # Fax #    Keesha Miranda [phone]       [facility] [address] [facility] [address]        Thank you!  "    Thank you for choosing [facility]  for your care. Our goal is always to provide you with excellent care. Hearing back from our patients is one way we can continue to improve our services. Please take a few minutes to complete the written survey that you may receive in the mail after your visit with us. Thank you!             Your Updated Medication List - Protect others around you: Learn how to safely use, store and throw away your medicines at www.disposemymeds.org.          This list is accurate as of: 6/12/17  4:28 PM.  Always use your most recent med list.                   Brand Name Dispense Instructions for use    cephALEXin 500 MG capsule    KEFLEX    20 capsule    Take 1 capsule (500 mg) by mouth 2 times daily       ciclopirox 8 % Soln     90 mL    Externally apply 1 mL topically daily       clotrimazole-betamethasone cream    LOTRISONE    30 g    Apply topically 2 times daily       order for DME     1 each    Equipment being ordered: Orthotics, bilateral, flat feet, one pair per year. Dispense one pair,       rivaroxaban ANTICOAGULANT 20 MG Tabs tablet    XARELTO     Take 1 tablet (20 mg) by mouth daily (with dinner)

## 2017-06-13 ENCOUNTER — MYC MEDICAL ADVICE (OUTPATIENT)
Dept: FAMILY MEDICINE | Facility: CLINIC | Age: 42
End: 2017-06-13

## 2017-06-13 NOTE — TELEPHONE ENCOUNTER
"I'm so glad you had a great trip!  Sounds like the clot behaved, hurray!    The insect bite could be a mosquito bite, sometimes there can be an exaggerated response to mosquito bites, especially if you're bitten in a different part of the country by an unfamiliar species of mosquito.  We always have to consider Lyme Disease with mosquito bites, but if you haven't had any other symptoms (fevers, chills, muscle aches), you're probably clear.  Let me know if you're worried, the treatment is doxycycline for 10 days, and there's no good test for detecting early Lyme Disease, so if that's a possibility, let me know.  I don't think of [place] as being too hospitable for mosquitoes, however.    Sometimes people get \"spider bites\" but these are usually actually staph skin reactions that need to be treated with antibiotics; these have very angry looking and painful small red skin swellings which get worse rapidly, which doesn't really sound like what you have.    So, bottom line, it doesn't sound like you have to come in, but let me know if you think you might have some of the distinguishing symptoms of either a staph infection or Lyme disease and we can talk treatment.    I would absolutely welcome information about [place], we're planning on taking [place] in the next few years and the stuff I've found on-line is a little overwhelming (sign up for character breakfasts a year ahead! Get fastpasses 90 days out! Which resort to stay at? Etc)      So glad you and the family had a fun time!    --Keesha"

## 2017-06-14 ENCOUNTER — MYC MEDICAL ADVICE (OUTPATIENT)
Dept: FAMILY MEDICINE | Facility: CLINIC | Age: 42
End: 2017-06-14

## 2017-06-14 NOTE — TELEPHONE ENCOUNTER
Routing to Dr. Heller and Dr. Miranda for review and to advise.    Thank you!  TRACY Rodney, BSN, RN

## 2017-06-15 ENCOUNTER — MYC MEDICAL ADVICE (OUTPATIENT)
Dept: FAMILY MEDICINE | Facility: CLINIC | Age: 42
End: 2017-06-15

## 2017-06-15 NOTE — TELEPHONE ENCOUNTER
Routing to Dr Heller and Dr Miranda for review.  Please advise.  Patient is currently in [facility].  Hyacinth Armendariz RN

## 2017-06-15 NOTE — TELEPHONE ENCOUNTER
Continue keflex, if increased swelling redness, squishiness, fever or pain go in to be seen may need drainage  Otherwise compelte antibiotic   Keflex also covers staph & strep organisms. If not better on it may need to change to different antibiotic like bactrim

## 2017-06-23 ENCOUNTER — MYC MEDICAL ADVICE (OUTPATIENT)
Dept: FAMILY MEDICINE | Facility: CLINIC | Age: 42
End: 2017-06-23

## 2017-06-23 DIAGNOSIS — I26.99 OTHER ACUTE PULMONARY EMBOLISM WITHOUT ACUTE COR PULMONALE (H): ICD-10-CM

## 2017-06-23 DIAGNOSIS — I82.431 ACUTE DEEP VEIN THROMBOSIS (DVT) OF POPLITEAL VEIN OF RIGHT LOWER EXTREMITY (H): ICD-10-CM

## 2017-06-23 DIAGNOSIS — I82.441 ACUTE DEEP VEIN THROMBOSIS (DVT) OF TIBIAL VEIN OF RIGHT LOWER EXTREMITY (H): ICD-10-CM

## 2017-07-20 ENCOUNTER — OFFICE VISIT (OUTPATIENT)
Dept: FAMILY MEDICINE | Facility: CLINIC | Age: 42
End: 2017-07-20
Payer: COMMERCIAL

## 2017-07-20 VITALS
HEART RATE: 68 BPM | SYSTOLIC BLOOD PRESSURE: 128 MMHG | TEMPERATURE: 98.6 F | BODY MASS INDEX: 34.8 KG/M2 | DIASTOLIC BLOOD PRESSURE: 82 MMHG | OXYGEN SATURATION: 98 % | WEIGHT: 246 LBS

## 2017-07-20 DIAGNOSIS — A49.02 MRSA INFECTION: Primary | ICD-10-CM

## 2017-07-20 DIAGNOSIS — I26.99 OTHER ACUTE PULMONARY EMBOLISM WITHOUT ACUTE COR PULMONALE (H): ICD-10-CM

## 2017-07-20 DIAGNOSIS — I82.441 ACUTE DEEP VEIN THROMBOSIS (DVT) OF TIBIAL VEIN OF RIGHT LOWER EXTREMITY (H): ICD-10-CM

## 2017-07-20 PROCEDURE — 99213 OFFICE O/P EST LOW 20 MIN: CPT | Performed by: FAMILY MEDICINE

## 2017-07-20 NOTE — PATIENT INSTRUCTIONS
[place] -MRSA Carrier Eradication Guidelines  Principals    All skin infection sites must be under treatment in order for eradication of the carrier state  to become possible. Boils must be drained. Systemic antibiotics may be necessary.    Use these guidelines for the patient and all household members at the same time, when  possible.  Treatment Regimen  1. Wash Body with Chlorhexidine (Hibiclens) antiseptic soap:    Wash whole body (from scalp to toes) daily for 5 days.    Skin moisturizer may be applied after bathing.    Scrub fingernails for one minute with nail brush twice daily    Better success if artificial nails and fingernail polish is removed.  2. Apply Mupirocin 2% (Bactroban) nasal topical antimicrobial cream (better) or ointment    Apply inside the front of each nostril three times a day for 5 days.  3. Oral antibiotics are NOT indicated for carriage, but may be indicated for active infections.  Household Furnishings and Personal Items  1. Thoroughly clean all toys and counters (kitchen, bathrooms).  2. Wash any cloth items that may be shared (towels, washcloths).  3. Don t share personal items such as toothbrushes and razors.  4. Toys and food Items may be shared.  Follow-Up  Success is good when directions are followed carefully, but some patients/families may relapse.

## 2017-07-20 NOTE — NURSING NOTE
"Chief Complaint   Patient presents with     RECHECK       Initial /82  Pulse 68  Temp 98.6  F (37  C) (Oral)  Wt 246 lb (111.6 kg)  SpO2 98%  BMI 34.8 kg/m2 Estimated body mass index is 34.8 kg/(m^2) as calculated from the following:    Height as of 6/12/17: 5' 10.5\" (1.791 m).    Weight as of this encounter: 246 lb (111.6 kg).  Medication Reconciliation: complete     Valorie Patterson MA    "

## 2017-07-20 NOTE — MR AVS SNAPSHOT
After Visit Summary   7/20/2017    Cristo Dong    MRN: [id number]           Patient Information     Date Of Birth          1975        Visit Information        Provider Department      7/20/2017 2:00 PM Keesha Miranda MD [facility]        Today's Diagnoses     MRSA infection    -  1    Acute deep vein thrombosis (DVT) of tibial vein of right lower extremity (H)        Other acute pulmonary embolism without acute cor pulmonale (H)          Care Instructions    [place] -MRSA Carrier Eradication Guidelines  Principals    All skin infection sites must be under treatment in order for eradication of the carrier state  to become possible. Boils must be drained. Systemic antibiotics may be necessary.    Use these guidelines for the patient and all household members at the same time, when  possible.  Treatment Regimen  1. Wash Body with Chlorhexidine (Hibiclens) antiseptic soap:    Wash whole body (from scalp to toes) daily for 5 days.    Skin moisturizer may be applied after bathing.    Scrub fingernails for one minute with nail brush twice daily    Better success if artificial nails and fingernail polish is removed.  2. Apply Mupirocin 2% (Bactroban) nasal topical antimicrobial cream (better) or ointment    Apply inside the front of each nostril three times a day for 5 days.  3. Oral antibiotics are NOT indicated for carriage, but may be indicated for active infections.  Household Furnishings and Personal Items  1. Thoroughly clean all toys and counters (kitchen, bathrooms).  2. Wash any cloth items that may be shared (towels, washcloths).  3. Don t share personal items such as toothbrushes and razors.  4. Toys and food Items may be shared.  Follow-Up  Success is good when directions are followed carefully, but some patients/families may relapse.              Follow-ups after your visit        Who to contact     If you have questions or need follow up information about today's clinic  visit or your schedule please contact [facility] directly at [phone].  Normal or non-critical lab and imaging results will be communicated to you by MyChart, letter or phone within 4 business days after the clinic has received the results. If you do not hear from us within 7 days, please contact the clinic through [facility] or phone. If you have a critical or abnormal lab result, we will notify you by phone as soon as possible.  Submit refill requests through [facility] or call your pharmacy and they will forward the refill request to us. Please allow 3 business days for your refill to be completed.          Additional Information About Your Visit        [facility] Information     [facility] gives you secure access to your electronic health record. If you see a primary care provider, you can also send messages to your care team and make appointments. If you have questions, please call your primary care clinic.  If you do not have a primary care provider, please call [phone] and they will assist you.        Care EveryWhere ID     This is your Care EveryWhere ID. This could be used by other organizations to access your [facility] medical records  [id number]        Your Vitals Were     Pulse Temperature Pulse Oximetry BMI (Body Mass Index)          68 98.6  F (37  C) (Oral) 98% 34.8 kg/m2         Blood Pressure from Last 3 Encounters:   07/20/17 128/82   06/12/17 125/86   05/26/17 138/82    Weight from Last 3 Encounters:   07/20/17 246 lb (111.6 kg)   06/12/17 246 lb (111.6 kg)   05/26/17 245 lb 12 oz (111.5 kg)              Today, you had the following     No orders found for display         Today's Medication Changes          These changes are accurate as of: 7/20/17  3:00 PM.  If you have any questions, ask your nurse or doctor.               Start taking these medicines.        Dose/Directions    chlorhexidine 4 % liquid   Commonly known as:  HIBICLENS   Used for:  MRSA infection   Started by:   Keesha Miranda MD        Wash thoroughly from head to toe nightly for 5 nights, including under fingernails, entire scalp, behind ears, between toes, etc.   Quantity:  120 mL   Refills:  0       Fingertip Pulse Oximeter Misc   Used for:  Other acute pulmonary embolism without acute cor pulmonale (H)   Started by:  Keesha Miranda MD        Use as needed to monitor pulse oxygen   Quantity:  1 each   Refills:  1       mupirocin 2 % nasal ointment   Commonly known as:  BACTROBAN NASAL   Used for:  MRSA infection   Started by:  Keesha Miranda MD        Dose:  0.5 g   Apply 0.5 g into each nare 3 times daily for 5 days   Quantity:  8 g   Refills:  0            Where to get your medicines      These medications were sent to [facility] [address] [place] [address] AT [address] [address]    Hours:  24-hours Phone:  [phone]     chlorhexidine 4 % liquid    mupirocin 2 % nasal ointment         Some of these will need a paper prescription and others can be bought over the counter.  Ask your nurse if you have questions.     Bring a paper prescription for each of these medications     Fingertip Pulse Oximeter Misc                Primary Care Provider Office Phone # Fax #    Keesha Miranda [phone]       [facility] [address]  [facility] [address]        Equal Access to Services     WARNER GOETZ AH: Hadii trey ku hadasho Soomaali, waaxda luqadaha, qaybta kaalmada adeegyada, kellie garcia ademat adams. So [facility] [phone].    ATENCIÓN: Si habla español, tiene a robertson disposición servicios gratuitos de asistencia lingüística. Llame al [phone].    We comply with applicable federal civil rights laws and [place] laws. We do not discriminate on the basis of race, color, national origin, age, disability sex, sexual orientation or gender identity.            Thank  you!     Thank you for choosing [facility]  for your care. Our goal is always to provide you with excellent care. Hearing back from our patients is one way we can continue to improve our services. Please take a few minutes to complete the written survey that you may receive in the mail after your visit with us. Thank you!             Your Updated Medication List - Protect others around you: Learn how to safely use, store and throw away your medicines at www.disposemymeds.org.          This list is accurate as of: 7/20/17  3:00 PM.  Always use your most recent med list.                   Brand Name Dispense Instructions for use Diagnosis    cephALEXin 500 MG capsule    KEFLEX    20 capsule    Take 1 capsule (500 mg) by mouth 2 times daily    Insect bite, initial encounter, Local reaction to insect sting, undetermined intent, initial encounter       chlorhexidine 4 % liquid    HIBICLENS    120 mL    Wash thoroughly from head to toe nightly for 5 nights, including under fingernails, entire scalp, behind ears, between toes, etc.    MRSA infection       ciclopirox 8 % Soln     90 mL    Externally apply 1 mL topically daily    Nail fungus, Toenail fungus       clotrimazole-betamethasone cream    LOTRISONE    30 g    Apply topically 2 times daily    Tinea corporis       Fingertip Pulse Oximeter Misc     1 each    Use as needed to monitor pulse oxygen    Other acute pulmonary embolism without acute cor pulmonale (H)       mupirocin 2 % nasal ointment    BACTROBAN NASAL    8 g    Apply 0.5 g into each nare 3 times daily for 5 days    MRSA infection       order for DME     1 each    Equipment being ordered: Orthotics, bilateral, flat feet, one pair per year. Dispense one pair,    Foot pain       rivaroxaban ANTICOAGULANT 20 MG Tabs tablet    XARELTO    90 tablet    Take 1 tablet (20 mg) by mouth daily (with dinner)    Acute deep vein thrombosis (DVT) of popliteal vein of right lower extremity (H), Acute deep  vein thrombosis (DVT) of tibial vein of right lower extremity (H), Other acute pulmonary embolism without acute cor pulmonale (H)

## 2017-07-20 NOTE — PROGRESS NOTES
SUBJECTIVE:                                                    Cristo Dong is a 42 year old male who presents to clinic today for the following health issues:      Pt is coming in clinic today to follow up on a blood clot that was in his right calf (end of May of this year), he states that he has been seeing specialist but would like to check I with PCP and has a few questions. Clots seems to have been triggered    Symptoms of clot have been calf feeling very tight, associated with muscle cramps at night, sweating/hot flashes, very fatigued, short of breath, especially after meals.  He has a home oximeter, went down to 87% when he was having a clot, usual for him is 96 - 97%.    Problem list and histories reviewed & adjusted, as indicated.  Additional history: as documented    Patient Active Problem List   Diagnosis     Pulmonary embolism and infarction (H)     Flat feet     Varicosities of leg     External hemorrhoids     Bunion, left     Bunion, right     Acute deep vein thrombosis (DVT) of tibial vein of right lower extremity (H)     Acute deep vein thrombosis (DVT) of popliteal vein of right lower extremity (H)     Other acute pulmonary embolism without acute cor pulmonale (H)     Past Surgical History:   Procedure Laterality Date     C HAND/FINGER SURGERY UNLISTED      RT HAND INDEX FINGER NERVE DAMAGE     C NERVE GRAFT,HAND/FOOT,ONE,=<4CM  1990    Nerve Graft - Right Hand     C REPAIR CRUCIATE LIGAMENT,KNEE  1991    Right Knee       Social History   Substance Use Topics     Smoking status: Never Smoker     Smokeless tobacco: Never Used     Alcohol use 2.0 oz/week      Comment: 4-6 drinks per wk     Family History   Problem Relation Age of Onset     DIABETES Paternal Grandmother      GASTROINTESTINAL DISEASE Paternal Grandfather      liver failure     Genitourinary Problems Paternal Grandfather      kidney failure     Neurologic Disorder Maternal Grandmother      Alzheimer's     Blood Disease Father       recurrent DVT (twice)     Sleep Apnea Father      mother         Allergies   Allergen Reactions     Codeine GI Disturbance     BP Readings from Last 3 Encounters:   07/20/17 128/82   06/12/17 125/86   05/26/17 138/82    Wt Readings from Last 3 Encounters:   07/20/17 246 lb (111.6 kg)   06/12/17 246 lb (111.6 kg)   05/26/17 245 lb 12 oz (111.5 kg)                        Reviewed and updated as needed this visit by clinical staffTobacco  Allergies  Meds  Med Hx  Surg Hx  Fam Hx  Soc Hx      Reviewed and updated as needed this visit by Provider         ROS:  Constitutional, HEENT, cardiovascular, pulmonary, gi and gu systems are negative, except as otherwise noted.      OBJECTIVE:   /82  Pulse 68  Temp 98.6  F (37  C) (Oral)  Wt 246 lb (111.6 kg)  SpO2 98%  BMI 34.8 kg/m2  Body mass index is 34.8 kg/(m^2).  GENERAL: healthy, alert and no distress  RESP: normal air movement, no respiratory distress  MS: no gross musculoskeletal defects noted, no edema  PSYCH: mentation appears normal, affect normal/bright    Diagnostic Test Results:  none     ASSESSMENT/PLAN:         1. MRSA infection  Recent skin infection, symptoms suggestive of MRSA<, he would like to try eradication  See orders  Please see patient instructions below.     - mupirocin (BACTROBAN NASAL) 2 % nasal ointment; Apply 0.5 g into each nare 3 times daily for 5 days  Dispense: 8 g; Refill: 0  - chlorhexidine (HIBICLENS) 4 % liquid; Wash thoroughly from head to toe nightly for 5 nights, including under fingernails, entire scalp, behind ears, between toes, etc.  Dispense: 120 mL; Refill: 0    2. Acute deep vein thrombosis (DVT) of tibial vein of right lower extremity (H)  Recurrent, with   3. Other acute pulmonary embolism without acute cor pulmonale (H)  symptoms resolved  Will order US to recheck DVT  Needs home oximeter replaced, medical DME order given to patient  - Misc. Devices (FINGERTIP PULSE OXIMETER) MISC; Use as needed to monitor pulse  oxygen  Dispense: 1 each; Refill: 1    Keesha Miranda MD  [facility]

## 2017-07-25 ENCOUNTER — MYC MEDICAL ADVICE (OUTPATIENT)
Dept: FAMILY MEDICINE | Facility: CLINIC | Age: 42
End: 2017-07-25

## 2017-07-25 DIAGNOSIS — D23.5 BENIGN NEOPLASM OF SKIN OF TRUNK, EXCEPT SCROTUM: Primary | ICD-10-CM

## 2017-07-25 NOTE — TELEPHONE ENCOUNTER
Writer does not find note regarding mole in most recent office visit.    Referral pended.    Dr. Miranda-please review and sign if agree.    Thank you!  BRITT GallegoN, RN

## 2017-07-27 ENCOUNTER — HOSPITAL ENCOUNTER (OUTPATIENT)
Dept: ULTRASOUND IMAGING | Facility: CLINIC | Age: 42
Discharge: HOME | End: 2017-07-27
Attending: FAMILY MEDICINE | Admitting: FAMILY MEDICINE
Payer: COMMERCIAL

## 2017-07-27 DIAGNOSIS — I82.441 ACUTE DEEP VEIN THROMBOSIS (DVT) OF TIBIAL VEIN OF RIGHT LOWER EXTREMITY (H): ICD-10-CM

## 2017-07-27 PROCEDURE — 93971 EXTREMITY STUDY: CPT | Mod: RT

## 2017-08-02 DIAGNOSIS — I82.431 ACUTE DEEP VEIN THROMBOSIS (DVT) OF POPLITEAL VEIN OF RIGHT LOWER EXTREMITY (H): ICD-10-CM

## 2017-08-02 DIAGNOSIS — I82.441 ACUTE DEEP VEIN THROMBOSIS (DVT) OF TIBIAL VEIN OF RIGHT LOWER EXTREMITY (H): Primary | ICD-10-CM

## 2017-08-03 NOTE — PROGRESS NOTES
Hi, I hadn't realized how far behind I'd gotten with the results, sorry about the delay!    You do still have clot in your leg but it's gotten smaller and is no longer blocking the vein.  Obviously you should stay on the Xarelto for now.  I recommend staying on a blood thinner for good, but it would certainly be reasonable to meet with either a hematologist or a vascular surgeon as well to discuss prognosis, treatments, and possibly other options outside of my limits of expertise!    Please let me know what you'd like to do and if you'd like a referral to a specialist.  I do recommend rechecking your ultrasound in 3-6 months because it would be good to know if the clot completely resolves or not.    Sincerely,  Dr. Keesha Miranda MD  8/2/2017

## 2017-08-17 ENCOUNTER — MYC MEDICAL ADVICE (OUTPATIENT)
Dept: FAMILY MEDICINE | Facility: CLINIC | Age: 42
End: 2017-08-17

## 2017-08-17 DIAGNOSIS — M79.89 SWELLING OF RIGHT LOWER EXTREMITY: Primary | ICD-10-CM

## 2017-08-17 DIAGNOSIS — I82.441 ACUTE DEEP VEIN THROMBOSIS (DVT) OF TIBIAL VEIN OF RIGHT LOWER EXTREMITY (H): ICD-10-CM

## 2017-08-17 DIAGNOSIS — I82.431 ACUTE DEEP VEIN THROMBOSIS (DVT) OF POPLITEAL VEIN OF RIGHT LOWER EXTREMITY (H): ICD-10-CM

## 2017-08-17 PROBLEM — M25.572 ACUTE LEFT ANKLE PAIN: Status: ACTIVE | Noted: 2017-08-17

## 2017-08-18 ENCOUNTER — HOSPITAL ENCOUNTER (OUTPATIENT)
Dept: ULTRASOUND IMAGING | Facility: CLINIC | Age: 42
Discharge: HOME | End: 2017-08-18
Attending: FAMILY MEDICINE | Admitting: FAMILY MEDICINE
Payer: COMMERCIAL

## 2017-08-18 DIAGNOSIS — Z86.718 PERSONAL HISTORY OF DVT (DEEP VEIN THROMBOSIS): ICD-10-CM

## 2017-08-18 DIAGNOSIS — M25.572 ACUTE LEFT ANKLE PAIN: ICD-10-CM

## 2017-08-18 PROCEDURE — 93970 EXTREMITY STUDY: CPT

## 2017-08-24 ENCOUNTER — MYC MEDICAL ADVICE (OUTPATIENT)
Dept: FAMILY MEDICINE | Facility: CLINIC | Age: 42
End: 2017-08-24

## 2017-08-29 ENCOUNTER — TELEPHONE (OUTPATIENT)
Dept: FAMILY MEDICINE | Facility: CLINIC | Age: 42
End: 2017-08-29

## 2017-08-29 DIAGNOSIS — A49.02 MRSA INFECTION: Primary | ICD-10-CM

## 2017-08-29 NOTE — TELEPHONE ENCOUNTER
Reason for Call:  Medication or medication refill:    Do you use a [facility]?  Name of the pharmacy and phone number for the current request:  [facility] [id number] [place], [place] [address] AVE AT [address]    Name of the medication requested: mupirocin (BACTROBAN NASAL) 2 % nasal ointment    Other request: Pharmacy states the medication above is not in stock, but they are able to dispense the 2% TOPICAL ointment. They are needing the okay. Please assist. Thanks!    Can we leave a detailed message on this number? Not Applicable    Phone number patient can be reached at: Other phone number:  Pharmacy [phone]    Best Time: Any / ASAP    Call taken on 8/29/2017 at 4:47 PM by Colleen Koo

## 2017-08-31 PROBLEM — A49.02 MRSA INFECTION: Status: ACTIVE | Noted: 2017-08-31

## 2017-08-31 RX ORDER — MUPIROCIN 20 MG/G
OINTMENT TOPICAL 3 TIMES DAILY
Qty: 22 G | Refills: 1 | Status: SHIPPED | OUTPATIENT
Start: 2017-08-31 | End: 2017-09-05

## 2017-08-31 NOTE — TELEPHONE ENCOUNTER
Yes, that's fine,   ASSESSMENT / PLAN:  (A49.02) MRSA infection  (primary encounter diagnosis)  Comment:   Plan: mupirocin (BACTROBAN) 2 % ointment        I sent the prescription to [facility] on [address], which is open 24 hours/day:  Address is:   [address] [place] [address] [phone]

## 2017-09-17 ENCOUNTER — MYC MEDICAL ADVICE (OUTPATIENT)
Dept: FAMILY MEDICINE | Facility: CLINIC | Age: 42
End: 2017-09-17

## 2017-09-17 DIAGNOSIS — I82.409 RECURRENT DEEP VEIN THROMBOSIS (DVT) (H): Primary | ICD-10-CM

## 2017-09-18 NOTE — TELEPHONE ENCOUNTER
This [employer] message is being routed to provider for response to pt. Pharmacy is loaded.    Shaunna Childs RN

## 2017-09-19 PROBLEM — I82.409 RECURRENT DEEP VEIN THROMBOSIS (DVT) (H): Status: ACTIVE | Noted: 2017-09-19

## 2017-11-19 ENCOUNTER — MYC MEDICAL ADVICE (OUTPATIENT)
Dept: FAMILY MEDICINE | Facility: CLINIC | Age: 42
End: 2017-11-19

## 2017-11-28 ENCOUNTER — MYC MEDICAL ADVICE (OUTPATIENT)
Dept: FAMILY MEDICINE | Facility: CLINIC | Age: 42
End: 2017-11-28

## 2017-11-28 DIAGNOSIS — I82.409 RECURRENT DEEP VEIN THROMBOSIS (DVT) (H): ICD-10-CM

## 2017-11-28 NOTE — TELEPHONE ENCOUNTER
Medication Detail      Disp Refills Start End ABA   rivaroxaban ANTICOAGULANT (XARELTO) 10 MG TABS tablet 90 tablet 1 9/19/2017  --   Sig: Take 1 tablet (10 mg) by mouth daily (with dinner)     lov 7/20/17

## 2017-11-30 NOTE — TELEPHONE ENCOUNTER
Xarelto refilled on 9/19/17 with 1 refill and sent to [facility] located at [address] [place] [address].    This refill request originating from [facility].    Team coordinators-Please contact patient to ask which pharmacy he would like Xarelto refills through.    Thank you!    TRACY Ramsay, BRITTN, RN

## 2017-12-04 ENCOUNTER — OFFICE VISIT (OUTPATIENT)
Dept: FAMILY MEDICINE | Facility: CLINIC | Age: 42
End: 2017-12-04
Payer: COMMERCIAL

## 2017-12-04 DIAGNOSIS — D48.5 NEOPLASM OF UNCERTAIN BEHAVIOR OF SKIN: Primary | ICD-10-CM

## 2017-12-04 DIAGNOSIS — D23.5 DYSPLASTIC NEVUS OF TRUNK: ICD-10-CM

## 2017-12-04 DIAGNOSIS — D22.5 MELANOCYTIC NEVI OF TRUNK: ICD-10-CM

## 2017-12-04 DIAGNOSIS — Z86.018 HISTORY OF DYSPLASTIC NEVUS: ICD-10-CM

## 2017-12-04 PROCEDURE — 11301 SHAVE SKIN LESION 0.6-1.0 CM: CPT | Performed by: FAMILY MEDICINE

## 2017-12-04 PROCEDURE — 99000 SPECIMEN HANDLING OFFICE-LAB: CPT | Performed by: FAMILY MEDICINE

## 2017-12-04 PROCEDURE — 99213 OFFICE O/P EST LOW 20 MIN: CPT | Mod: 25 | Performed by: FAMILY MEDICINE

## 2017-12-04 PROCEDURE — 88305 TISSUE EXAM BY PATHOLOGIST: CPT | Performed by: FAMILY MEDICINE

## 2017-12-04 NOTE — LETTER
December 8, 2017    Cristo Dong  [address]  [facility] [address]        Dear Cristo,    The lesion that was removed from the back was benign (not cancer) and is called an atypical nevus. A nevus with atypical changes has a small potential to evolve into a melanoma; therefore, we usually recommend completely removing the atypical nevus. Your atypical nevus was completely removed. If any pigmentation should recur then it should be evaluated.     I would recommend annual full-body skin exam.     Thank you for allowing me to be involved in your health care and for choosing [facility].  If you have any questions or concerns please feel free to contact me, [phone].      Sincerely,      Marilee Caban M.D.

## 2017-12-04 NOTE — PROGRESS NOTES
[facility] (formerly [facility]) - PRIMARY CARE SKIN    CC : Lesion(s)  SUBJECTIVE:                                                    Cristo Dong is a 42 year old male who presents to clinic today because of moles on the back for which his primary care provider has recommended further evaluation.    Bothersome lesions noticed by the patient or other skin concerns :  Issue One : Moles on back.  Onset : first noticed by primary care provider in May 2017.  Enlarging : NO.  Bleeding : NO  Itchy or irritating : NO.  Pain or tenderness : NO.  Changing color : NO.    Current medications : anticoagulation with Xarelto    Personal history of skin cancer : NO.  Family history of skin cancer : ?YES in father.    Sun Exposure History  Previous history of significant sun exposure: grew up in [facility] [facility]  Blistering sunburns : NO  Tanning beds : NO.  Sunscreen Use : YES, SPF : 30+.  Sun-protective clothing use : sometimes  Wide-brimmed hats : Yes  Sunglasses : Yes  Seeks shade : No    Refer to electronic medical record (EMR) for past medical history and medications.    INTEGUMENTARY/SKIN: POSITIVE for mole changes  ROS : 14 point review of systems was negative except the symptoms listed above in the HPI.    This document serves as a record of the services and decisions personally performed and made by Sherri Caban MD. It was created on her behalf by Dank Medrano, a trained medical scribe.  The creation of this document is based on the scribe's personal observations and the provider's statements to the medical scribe.  Dank Medraon, December 4, 2017 11:07 AM      OBJECTIVE:                                                    GENERAL: healthy, alert and no distress  SKIN: Diamond Skin Type - I.  Trunk were examined. The dermatoscope was used to help evaluate pigmented lesions.  Skin Pertinent Findings:  Back : Multiple 2 mm - 6 mm in size, brown macules most consistent with benign (melanocytic nevi)     Back, at T4, 1 cm left of midline : 6 mm in  "size irregularly brown macule. ? Atypical Nevus ? Other    Diagnostic Test Results:  none           ASSESSMENT:                                                      Encounter Diagnoses   Name Primary?     Neoplasm of uncertain behavior of skin Yes     Melanocytic nevi of trunk            PLAN:                                                    Patient Instructions   FUTURE APPOINTMENTS  Follow up per pathology report.    Avoid swimming for at least 1 week until the wound has healed.    WOUND CARE INSTRUCTIONS  1. After 24 hours, change dressing daily.  2. Wash hands before every dressing change.  3. Wash the wound area with a mild soap, then rinse  4. Gently pat dry with a sterile gauze or Q-tip.  5. Apply Vaseline or Aquaphor only over entire wound. Do NOT use Neosporin - as many people react to neomycin.  6. Finally, cover with a bandage or sterile non-stick gauze with micropore paper tape.  7. Repeat once daily until wound has healed.    Do not let the wound dry out.  The wound will heal faster and with better cosmetic results if routinely kept moist with step #5. It is a false belief that a wound heals better when it is exposed to air and allowed to dry out.      Soap, water and shampoo will not hurt this area.    Do not go swimming or take baths, but showering is encouraged.    Limit use of the area where the procedure was done for a few days to allow for optimal healing.    If you experience bleeding:  Repeat steps #2-5 and hold firm pressure on the area for 10 minutes without checking to see if the bleeding has stopped. \"Checking\" pulls off the protective wound clot and restarts the bleeding all over again. Re-apply pressure for 10 minutes if necessary to stop bleeding.  Use additional sterile gauze and tape to maintain pressure once bleeding has stopped.    Signs of Infection:  Infection can occur in any area where skin has been disrupted.  If you notice persistent redness, swelling, colored drainage, " increasing pain, fever or other signs of infection, please call us at: [phone] and ask to have me or my colleague paged. We will call you back to discuss.    Pathology Results:  You will be notified, generally via letter or MyChart, in approximately 10 days. If there is anything we need to discuss or further treatment needed, I will call you to discuss it.    Skin tissue can be some of the most challenging tissue for pathologists to examine, therefore we may use a specialist outside the [employer] system to read certain submitted tissue samples. When submitted to these outside specialists, [employer] will notify you that your tissue sample result has returned. However, this only means that the tissue sample has been sent to the specialist. These results are not available in [employer], so I will contact you when I receive the final report.    PATIENT INFORMATION : WOUNDS  During the healing process you will notice a number of changes. All wounds develop a small halo of redness surrounding the wound.  This means healing is occurring. Severe itching with extensive redness usually indicates sensitivity to the ointment or bandage tape used to dress the wound.  You should call our office if this develops.      Swelling  and/or discoloration around your surgical site is common, particularly when performed around the eye.    All wounds normally drain.  The larger the wound the more drainage there will be.  After 7-10 days, you will notice the wound beginning to shrink and new skin will begin to grow.  The wound is healed when you can see skin has formed over the entire area.  A healed wound has a healthy, shiny look to the surface and is red to dark pink in color to normalize.  Wounds may take approximately 4-6 weeks to heal.  Larger wounds may take 6-8 weeks. After the wound is healed you may discontinue dressing changes.    You may experience a sensation of tightness as your wound heals. This is normal and will gradually  subside.    Your healed wound may be sensitive to temperature changes. This sensitivity improves with time, but if you re having a lot of discomfort, try to avoid temperature extremes.    Patients frequently experience itching after their wound appears to have healed because of the continue healing under the skin.  Plain Vaseline will help relieve the itching.          PROCEDURES:                                                    Name : Shave Excision  Indication : Excision of tissue for pathology evaluation.  Location(s) : Back, at T4, 1 cm left of midline : 6 mm in size irregularly brown macule. ? Atypical Nevus ? Other.  Completed by : Sherri Caban MD  Photo Taken : no.  Anesthesia : Patient was anesthetized by infiltrating the area surrounding the lesion with 1% lidocaine.   epinephrine [id number]:241705 : Yes.  Buffered with bicarbonate : Yes.  Note : Discussed the risk of pain, infection, scarring, hypo- or hyperpigmentation and recurrence or need for re-treatment. The benefits of treatment and alternative treatments were also discussed.    During this procedure, the universal protocol was utilized. The patient's identity was confirmed by no less than two patient identifiers, correct procedure was verified, correct site was verified and marked as applicable and a final pause was completed.    Sterile technique was used throughout the procedure. The skin was cleaned and prepped with surgical cleanser. Once adequate anesthesia was obtained, the lesion was removed with a deep scallop shave procedure. The specimen was sent to pathology.    Direct pressure and aluminum chloride and monopolar cautery was applied for hemostasis. No bleeding was present upon the completion of the procedure. The wound was coated with antibacterial ointment. A dry sterile dressing was applied. Patient tolerated the procedure well and left in satisfactory condition.    Primary provider and referring provider will be informed regarding the  tissue report when it returns.      The information in this document, created by the medical scribe for me, accurately reflects the services I personally performed and the decisions made by me. I have reviewed and approved this document for accuracy prior to leaving the patient care area.  Sherri Caban MD December 4, 2017 11:07 AM  [facility] - PRIMARY CARE SKIN

## 2017-12-04 NOTE — MR AVS SNAPSHOT
"              After Visit Summary   12/4/2017    Cristo Dong    MRN: [id number]           Patient Information     Date Of Birth          1975        Visit Information        Provider Department      12/4/2017 11:20 AM Marilee Caban MD [facility] - Primary Care Skin        Today's Diagnoses     Neoplasm of uncertain behavior of skin    -  1      Care Instructions    FUTURE APPOINTMENTS  Follow up per pathology report.    Avoid swimming for at least 1 week until the wound has healed.    WOUND CARE INSTRUCTIONS  1. After 24 hours, change dressing daily.  2. Wash hands before every dressing change.  3. Wash the wound area with a mild soap, then rinse  4. Gently pat dry with a sterile gauze or Q-tip.  5. Apply Vaseline or Aquaphor only over entire wound. Do NOT use Neosporin - as many people react to neomycin.  6. Finally, cover with a bandage or sterile non-stick gauze with micropore paper tape.  7. Repeat once daily until wound has healed.    Do not let the wound dry out.  The wound will heal faster and with better cosmetic results if routinely kept moist with step #5. It is a false belief that a wound heals better when it is exposed to air and allowed to dry out.      Soap, water and shampoo will not hurt this area.    Do not go swimming or take baths, but showering is encouraged.    Limit use of the area where the procedure was done for a few days to allow for optimal healing.    If you experience bleeding:  Repeat steps #2-5 and hold firm pressure on the area for 10 minutes without checking to see if the bleeding has stopped. \"Checking\" pulls off the protective wound clot and restarts the bleeding all over again. Re-apply pressure for 10 minutes if necessary to stop bleeding.  Use additional sterile gauze and tape to maintain pressure once bleeding has stopped.    Signs of Infection:  Infection can occur in any area where skin has been disrupted.  If you notice persistent redness, swelling, " colored drainage, increasing pain, fever or other signs of infection, please call us at: [phone] and ask to have me or my colleague paged. We will call you back to discuss.    Pathology Results:  You will be notified, generally via letter or MyChart, in approximately 10 days. If there is anything we need to discuss or further treatment needed, I will call you to discuss it.    Skin tissue can be some of the most challenging tissue for pathologists to examine, therefore we may use a specialist outside the [employer] system to read certain submitted tissue samples. When submitted to these outside specialists, [employer] will notify you that your tissue sample result has returned. However, this only means that the tissue sample has been sent to the specialist. These results are not available in [employer], so I will contact you when I receive the final report.    PATIENT INFORMATION : WOUNDS  During the healing process you will notice a number of changes. All wounds develop a small halo of redness surrounding the wound.  This means healing is occurring. Severe itching with extensive redness usually indicates sensitivity to the ointment or bandage tape used to dress the wound.  You should call our office if this develops.      Swelling  and/or discoloration around your surgical site is common, particularly when performed around the eye.    All wounds normally drain.  The larger the wound the more drainage there will be.  After 7-10 days, you will notice the wound beginning to shrink and new skin will begin to grow.  The wound is healed when you can see skin has formed over the entire area.  A healed wound has a healthy, shiny look to the surface and is red to dark pink in color to normalize.  Wounds may take approximately 4-6 weeks to heal.  Larger wounds may take 6-8 weeks. After the wound is healed you may discontinue dressing changes.    You may experience a sensation of tightness as your wound heals. This is normal  and will gradually subside.    Your healed wound may be sensitive to temperature changes. This sensitivity improves with time, but if you re having a lot of discomfort, try to avoid temperature extremes.    Patients frequently experience itching after their wound appears to have healed because of the continue healing under the skin.  Plain Vaseline will help relieve the itching.          Follow-ups after your visit        Who to contact     If you have questions or need follow up information about today's clinic visit or your schedule please contact [facility] - PRIMARY CARE SKIN directly at [phone].  Normal or non-critical lab and imaging results will be communicated to you by [employer], letter or phone within 4 business days after the clinic has received the results. If you do not hear from us within 7 days, please contact the clinic through [employer] or phone. If you have a critical or abnormal lab result, we will notify you by phone as soon as possible.  Submit refill requests through [employer] or call your pharmacy and they will forward the refill request to us. Please allow 3 business days for your refill to be completed.          Additional Information About Your Visit        [employer] Information     [employer] gives you secure access to your electronic health record. If you see a primary care provider, you can also send messages to your care team and make appointments. If you have questions, please call your primary care clinic.  If you do not have a primary care provider, please call [phone] and they will assist you.        Care EveryWhere ID     This is your Care EveryWhere ID. This could be used by other organizations to access your [facility] medical records  [id number]         Blood Pressure from Last 3 Encounters:   07/20/17 128/82   06/12/17 125/86   05/26/17 138/82    Weight from Last 3 Encounters:   07/20/17 246 lb (111.6 kg)   06/12/17 246 lb (111.6 kg)   05/26/17 245 lb 12 oz (111.5 kg)               Today, you had the following     No orders found for display       Primary Care Provider Office Phone # Fax #    Keesha Miranda [phone] [address]  [facility] [address]        Equal Access to Services     WARNER GOETZ : Hadwali trey schwartz angieo Soomaali, waaxda luqadaha, qaybta kaalmada adeegyada, kellie christophern gwendolyn mcdaniels laantoniettaalisson adams. So [facility] [phone].    ATENCIÓN: Si habla español, tiene a robertson disposición servicios gratuitos de asistencia lingüística. Llame al [phone].    We comply with applicable federal civil rights laws and [place] laws. We do not discriminate on the basis of race, color, national origin, age, disability, sex, sexual orientation, or gender identity.            Thank you!     Thank you for choosing [facility] PRIMARY CARE [facility]  for your care. Our goal is always to provide you with excellent care. Hearing back from our patients is one way we can continue to improve our services. Please take a few minutes to complete the written survey that you may receive in the mail after your visit with us. Thank you!             Your Updated Medication List - Protect others around you: Learn how to safely use, store and throw away your medicines at www.disposemymeds.org.          This list is accurate as of: 12/4/17 11:22 AM.  Always use your most recent med list.                   Brand Name Dispense Instructions for use Diagnosis    Fingertip Pulse Oximeter Misc     1 each    Use as needed to monitor pulse oxygen    Other acute pulmonary embolism without acute cor pulmonale (H)       order for DME     1 each    Equipment being ordered: Orthotics, bilateral, flat feet, one pair per year. Dispense one pair,    Foot pain       rivaroxaban ANTICOAGULANT 10 MG Tabs tablet    XARELTO    90 tablet    Take 1 tablet (10 mg) by mouth daily (with dinner)    Recurrent deep vein thrombosis (DVT) (H)

## 2017-12-04 NOTE — PATIENT INSTRUCTIONS
"FUTURE APPOINTMENTS  Follow up per pathology report.    Avoid swimming for at least 1 week until the wound has healed.    WOUND CARE INSTRUCTIONS  1. After 24 hours, change dressing daily.  2. Wash hands before every dressing change.  3. Wash the wound area with a mild soap, then rinse  4. Gently pat dry with a sterile gauze or Q-tip.  5. Apply Vaseline or Aquaphor only over entire wound. Do NOT use Neosporin - as many people react to neomycin.  6. Finally, cover with a bandage or sterile non-stick gauze with micropore paper tape.  7. Repeat once daily until wound has healed.    Do not let the wound dry out.  The wound will heal faster and with better cosmetic results if routinely kept moist with step #5. It is a false belief that a wound heals better when it is exposed to air and allowed to dry out.      Soap, water and shampoo will not hurt this area.    Do not go swimming or take baths, but showering is encouraged.    Limit use of the area where the procedure was done for a few days to allow for optimal healing.    If you experience bleeding:  Repeat steps #2-5 and hold firm pressure on the area for 10 minutes without checking to see if the bleeding has stopped. \"Checking\" pulls off the protective wound clot and restarts the bleeding all over again. Re-apply pressure for 10 minutes if necessary to stop bleeding.  Use additional sterile gauze and tape to maintain pressure once bleeding has stopped.    Signs of Infection:  Infection can occur in any area where skin has been disrupted.  If you notice persistent redness, swelling, colored drainage, increasing pain, fever or other signs of infection, please call us at: [phone] and ask to have me or my colleague paged. We will call you back to discuss.    Pathology Results:  You will be notified, generally via letter or MyChart, in approximately 10 days. If there is anything we need to discuss or further treatment needed, I will call you to discuss it.    Skin " tissue can be some of the most challenging tissue for pathologists to examine, therefore we may use a specialist outside the [employer] system to read certain submitted tissue samples. When submitted to these outside specialists, [employer] will notify you that your tissue sample result has returned. However, this only means that the tissue sample has been sent to the specialist. These results are not available in [employer], so I will contact you when I receive the final report.    PATIENT INFORMATION : WOUNDS  During the healing process you will notice a number of changes. All wounds develop a small halo of redness surrounding the wound.  This means healing is occurring. Severe itching with extensive redness usually indicates sensitivity to the ointment or bandage tape used to dress the wound.  You should call our office if this develops.      Swelling  and/or discoloration around your surgical site is common, particularly when performed around the eye.    All wounds normally drain.  The larger the wound the more drainage there will be.  After 7-10 days, you will notice the wound beginning to shrink and new skin will begin to grow.  The wound is healed when you can see skin has formed over the entire area.  A healed wound has a healthy, shiny look to the surface and is red to dark pink in color to normalize.  Wounds may take approximately 4-6 weeks to heal.  Larger wounds may take 6-8 weeks. After the wound is healed you may discontinue dressing changes.    You may experience a sensation of tightness as your wound heals. This is normal and will gradually subside.    Your healed wound may be sensitive to temperature changes. This sensitivity improves with time, but if you re having a lot of discomfort, try to avoid temperature extremes.    Patients frequently experience itching after their wound appears to have healed because of the continue healing under the skin.  Plain Vaseline will help relieve the itching.

## 2017-12-04 NOTE — LETTER
12/4/2017         RE: Cristo Dong  [address]  [facility] [address]        Dear Colleague,    Thank you for referring your patient, Critso Dong, to the [facility] - PRIMARY CARE SKIN. Please see a copy of my visit note below.    [facility] PRIMARY CARE SKIN    CC : Lesion(s)  SUBJECTIVE:                                                    Cristo Dong is a 42 year old male who presents to clinic today because of moles on the back for which his primary care provider has recommended further evaluation.    Bothersome lesions noticed by the patient or other skin concerns :  Issue One : Moles on back.  Onset : first noticed by primary care provider in May 2017.  Enlarging : NO.  Bleeding : NO  Itchy or irritating : NO.  Pain or tenderness : NO.  Changing color : NO.    Current medications : anticoagulation with Xarelto    Personal history of skin cancer : NO.  Family history of skin cancer : ?YES in father.    Sun Exposure History  Previous history of significant sun exposure: grew up in [facility]  Blistering sunburns : NO  Tanning beds : NO.  Sunscreen Use : YES, SPF : 30+.  Sun-protective clothing use : sometimes  Wide-brimmed hats : Yes  Sunglasses : Yes  Seeks shade : No    Refer to electronic medical record (EMR) for past medical history and medications.    INTEGUMENTARY/SKIN: POSITIVE for mole changes  ROS : 14 point review of systems was negative except the symptoms listed above in the HPI.    This document serves as a record of the services and decisions personally performed and made by Sherri Caban MD. It was created on her behalf by Dank Medrano, a trained medical scribe.  The creation of this document is based on the scribe's personal observations and the provider's statements to the medical scribe.  Dank Medrano, December 4, 2017 11:07 AM      OBJECTIVE:                                                    GENERAL: healthy, alert and no distress  SKIN: Diamond Skin Type - I.  Trunk  "were examined. The dermatoscope was used to help evaluate pigmented lesions.  Skin Pertinent Findings:  Back : Multiple 2 mm - 6 mm in size, brown macules most consistent with benign (melanocytic nevi)     Back, at T4, 1 cm left of midline : 6 mm in size irregularly brown macule. ? Atypical Nevus ? Other    Diagnostic Test Results:  none           ASSESSMENT:                                                      Encounter Diagnoses   Name Primary?     Neoplasm of uncertain behavior of skin Yes     Melanocytic nevi of trunk            PLAN:                                                    Patient Instructions   FUTURE APPOINTMENTS  Follow up per pathology report.    Avoid swimming for at least 1 week until the wound has healed.    WOUND CARE INSTRUCTIONS  1. After 24 hours, change dressing daily.  2. Wash hands before every dressing change.  3. Wash the wound area with a mild soap, then rinse  4. Gently pat dry with a sterile gauze or Q-tip.  5. Apply Vaseline or Aquaphor only over entire wound. Do NOT use Neosporin - as many people react to neomycin.  6. Finally, cover with a bandage or sterile non-stick gauze with micropore paper tape.  7. Repeat once daily until wound has healed.    Do not let the wound dry out.  The wound will heal faster and with better cosmetic results if routinely kept moist with step #5. It is a false belief that a wound heals better when it is exposed to air and allowed to dry out.      Soap, water and shampoo will not hurt this area.    Do not go swimming or take baths, but showering is encouraged.    Limit use of the area where the procedure was done for a few days to allow for optimal healing.    If you experience bleeding:  Repeat steps #2-5 and hold firm pressure on the area for 10 minutes without checking to see if the bleeding has stopped. \"Checking\" pulls off the protective wound clot and restarts the bleeding all over again. Re-apply pressure for 10 minutes if necessary to stop " bleeding.  Use additional sterile gauze and tape to maintain pressure once bleeding has stopped.    Signs of Infection:  Infection can occur in any area where skin has been disrupted.  If you notice persistent redness, swelling, colored drainage, increasing pain, fever or other signs of infection, please call us at: [phone] and ask to have me or my colleague paged. We will call you back to discuss.    Pathology Results:  You will be notified, generally via letter or Inst[employer], in approximately 10 days. If there is anything we need to discuss or further treatment needed, I will call you to discuss it.    Skin tissue can be some of the most challenging tissue for pathologists to examine, therefore we may use a specialist outside the [employer] system to read certain submitted tissue samples. When submitted to these outside specialists, [facility] will notify you that your tissue sample result has returned. However, this only means that the tissue sample has been sent to the specialist. These results are not available in [employer], so I will contact you when I receive the final report.    PATIENT INFORMATION : WOUNDS  During the healing process you will notice a number of changes. All wounds develop a small halo of redness surrounding the wound.  This means healing is occurring. Severe itching with extensive redness usually indicates sensitivity to the ointment or bandage tape used to dress the wound.  You should call our office if this develops.      Swelling  and/or discoloration around your surgical site is common, particularly when performed around the eye.    All wounds normally drain.  The larger the wound the more drainage there will be.  After 7-10 days, you will notice the wound beginning to shrink and new skin will begin to grow.  The wound is healed when you can see skin has formed over the entire area.  A healed wound has a healthy, shiny look to the surface and is red to dark pink in color to normalize.   Wounds may take approximately 4-6 weeks to heal.  Larger wounds may take 6-8 weeks. After the wound is healed you may discontinue dressing changes.    You may experience a sensation of tightness as your wound heals. This is normal and will gradually subside.    Your healed wound may be sensitive to temperature changes. This sensitivity improves with time, but if you re having a lot of discomfort, try to avoid temperature extremes.    Patients frequently experience itching after their wound appears to have healed because of the continue healing under the skin.  Plain Vaseline will help relieve the itching.          PROCEDURES:                                                    Name : Shave Excision  Indication : Excision of tissue for pathology evaluation.  Location(s) : Back, at T4, 1 cm left of midline : 6 mm in size irregularly brown macule. ? Atypical Nevus ? Other.  Completed by : Sherri Caban MD  Photo Taken : no.  Anesthesia : Patient was anesthetized by infiltrating the area surrounding the lesion with 1% lidocaine.   epinephrine [id number] : Yes.  Buffered with bicarbonate : Yes.  Note : Discussed the risk of pain, infection, scarring, hypo- or hyperpigmentation and recurrence or need for re-treatment. The benefits of treatment and alternative treatments were also discussed.    During this procedure, the universal protocol was utilized. The patient's identity was confirmed by no less than two patient identifiers, correct procedure was verified, correct site was verified and marked as applicable and a final pause was completed.    Sterile technique was used throughout the procedure. The skin was cleaned and prepped with surgical cleanser. Once adequate anesthesia was obtained, the lesion was removed with a deep scallop shave procedure. The specimen was sent to pathology.    Direct pressure and aluminum chloride and monopolar cautery was applied for hemostasis. No bleeding was present upon the completion of the  procedure. The wound was coated with antibacterial ointment. A dry sterile dressing was applied. Patient tolerated the procedure well and left in satisfactory condition.    Primary provider and referring provider will be informed regarding the tissue report when it returns.      The information in this document, created by the medical scribe for me, accurately reflects the services I personally performed and the decisions made by me. I have reviewed and approved this document for accuracy prior to leaving the patient care area.  Sherri Caban MD December 4, 2017 11:07 AM  [facility] - PRIMARY CARE SKIN    Again, thank you for allowing me to participate in the care of your patient.        Sincerely,        Marilee Caban MD

## 2017-12-08 PROBLEM — Z86.018 HISTORY OF DYSPLASTIC NEVUS: Status: ACTIVE | Noted: 2017-12-08

## 2017-12-08 NOTE — PROGRESS NOTES
ADDENDUM:                                                    December 8, 2017 9:04 AM  Pathology report results:

## 2017-12-11 ENCOUNTER — TELEPHONE (OUTPATIENT)
Dept: FAMILY MEDICINE | Facility: CLINIC | Age: 42
End: 2017-12-11

## 2017-12-11 NOTE — TELEPHONE ENCOUNTER
Patient notified of test results and PCP's message, no further questions.  Holly Fritz RN  [facility]  [phone]            December 8, 2017    Cristo Dong  [address] [address]        Dear Cristo,    The lesion that was removed from the back was benign (not cancer) and is called an atypical nevus. A nevus with atypical changes has a small potential to evolve into a melanoma; therefore, we usually recommend completely removing the atypical nevus. Your atypical nevus was completely removed. If any pigmentation should recur then it should be evaluated.     I would recommend annual full-body skin exam.

## 2017-12-11 NOTE — TELEPHONE ENCOUNTER
I called Adrien and cancelled refill on the 9/19/17 prescription that was sent.  Sent/transferred to [facility].  Hyacinth Armendariz RN

## 2018-03-17 ENCOUNTER — MYC MEDICAL ADVICE (OUTPATIENT)
Dept: FAMILY MEDICINE | Facility: CLINIC | Age: 43
End: 2018-03-17

## 2018-03-17 DIAGNOSIS — I82.409 RECURRENT DEEP VEIN THROMBOSIS (DVT) (H): ICD-10-CM

## 2018-03-19 ENCOUNTER — MYC REFILL (OUTPATIENT)
Dept: FAMILY MEDICINE | Facility: CLINIC | Age: 43
End: 2018-03-19

## 2018-03-19 DIAGNOSIS — I82.409 RECURRENT DEEP VEIN THROMBOSIS (DVT) (H): ICD-10-CM

## 2018-03-19 NOTE — TELEPHONE ENCOUNTER
Writer unable to pend one month Xarelto for one pharmacy and three months of X[facility] for [facility].    Dr. Miranda-Please review and sign off on one month supply, then please send three month supply to [facility].    Thank you!  TRACY Ramsay, BSN, RN

## 2018-03-19 NOTE — TELEPHONE ENCOUNTER
Message from [employer]:  Original authorizing provider: MD Paul Travisson Iker would like a refill of the following medications:  rivaroxaban ANTICOAGULANT (XARELTO) 10 MG TABS tablet [Keseha Miranda MD]    Preferred pharmacy: [facility] [address] [place] [address] [address] AT [address]    Comment:  Can you please renew this for 30 days and send it to [facility]? Thank you!

## 2018-09-05 ENCOUNTER — MYC REFILL (OUTPATIENT)
Dept: FAMILY MEDICINE | Facility: CLINIC | Age: 43
End: 2018-09-05

## 2018-09-05 DIAGNOSIS — I82.409 RECURRENT DEEP VEIN THROMBOSIS (DVT) (H): ICD-10-CM

## 2018-09-05 NOTE — TELEPHONE ENCOUNTER
Message from [employer]:  Original authorizing provider: MD Paul Travisson Iker would like a refill of the following medications:  rivaroxaban ANTICOAGULANT (XARELTO) 10 MG TABS tablet [Keesha Miranda MD]    Preferred [facility]: [facility] - [place], [place] - [address] AT PORTAL TO [facility]ED [facility] SITES    Comment:  Time for a refill. Can you request the three month supply from [facility]? Thanks!! Cristo

## 2018-09-06 NOTE — TELEPHONE ENCOUNTER
"Requested Prescriptions   Pending Prescriptions Disp Refills     rivaroxaban ANTICOAGULANT (XARELTO) 10 MG TABS tablet  Last Written Prescription Date:  6/29/2018  Last Fill Quantity: 90 tablet,  # refills: 3   Last Office Visit: 12/4/2017   Future Office Visit:      90 tablet 3     Sig: Take 1 tablet (10 mg) by mouth daily (with dinner)    Platelet Inhibitors Failed    9/5/2018 10:39 AM       Failed - Normal ALT on file in past 12 months    Recent Labs   Lab Test  03/18/11   1113   ALT  36          Failed - Normal HGB on file in past 12 months    Recent Labs   Lab Test  06/30/15   0942   HGB  14.3          Failed - Normal AST on file in past 12 months    No lab results found.         Failed - Normal Platelets on file in past 12 months    Recent Labs   Lab Test  03/18/11   1113   PLT  254          Failed - Normal serum creatinine on file in past 12 months    No lab results found.         Passed - Recent (12 mo) or future (30 days) visit within the authorizing provider's specialty    Patient had office visit in the last 12 months or has a visit in the next 30 days with authorizing provider or within the authorizing provider's specialty.  See \"Patient Info\" tab in inbasket, or \"Choose Columns\" in Meds & Orders section of the refill encounter.           Passed - Patient is age 18 or older          "

## 2018-09-20 ENCOUNTER — OFFICE VISIT (OUTPATIENT)
Dept: FAMILY MEDICINE | Facility: CLINIC | Age: 43
End: 2018-09-20
Payer: COMMERCIAL

## 2018-09-20 VITALS
WEIGHT: 248 LBS | BODY MASS INDEX: 35.08 KG/M2 | SYSTOLIC BLOOD PRESSURE: 141 MMHG | HEART RATE: 75 BPM | TEMPERATURE: 98 F | RESPIRATION RATE: 12 BRPM | OXYGEN SATURATION: 98 % | DIASTOLIC BLOOD PRESSURE: 86 MMHG

## 2018-09-20 DIAGNOSIS — R06.83 SNORING: Primary | ICD-10-CM

## 2018-09-20 PROCEDURE — 99213 OFFICE O/P EST LOW 20 MIN: CPT | Performed by: FAMILY MEDICINE

## 2018-09-20 NOTE — PROGRESS NOTES
SUBJECTIVE:   Cristo Dong is a 43 year old male who presents to clinic today for the following health issues:    Recurrent DVT  He's been on varying doses of Xarelto, has been on different doses, starting with 25 mg, then j20 mg, and now 10 mg daily.  He plans to go to [place] in December, is wondering what he should do to prevent clots.  He wears compression socks on flights. He knows to drink lots of water.    Sleep Apnea      Pt will wake up gasping    Family history of sleep apnea in both parents; both had CPAP    He'd been dreaming he was squeezed by a snake    He's been swimming for exercise in the past year and has had greatly improved lung capacity    He snores occasionally, no apneic episodes noted, but he does have frequent night waking    He denies morning headaches    He falls asleep easily       Problem list and histories reviewed & adjusted, as indicated.  Additional history: as documented    Patient Active Problem List   Diagnosis     Pulmonary embolism and infarction (H)     Flat feet     Varicosities of leg     External hemorrhoids     Bunion, left     Bunion, right     Acute deep vein thrombosis (DVT) of tibial vein of right lower extremity (H)     Acute deep vein thrombosis (DVT) of popliteal vein of right lower extremity (H)     Other acute pulmonary embolism without acute cor pulmonale (H)     Acute left ankle pain     MRSA infection     Recurrent deep vein thrombosis (DVT) (H)     History of dysplastic nevus     Snoring     Past Surgical History:   Procedure Laterality Date     C HAND/FINGER SURGERY UNLISTED      RT HAND INDEX FINGER NERVE DAMAGE     C NERVE GRAFT,HAND/FOOT,ONE,=<4CM  1990    Nerve Graft - Right Hand     C REPAIR CRUCIATE LIGAMENT,KNEE  1991    Right Knee       Social History   Substance Use Topics     Smoking status: Never Smoker     Smokeless tobacco: Never Used     Alcohol use 2.0 oz/week      Comment: 4-6 drinks per wk     Family History   Problem Relation Age of Onset      Diabetes Paternal Grandmother      GASTROINTESTINAL DISEASE Paternal Grandfather      liver failure     Genitourinary Problems Paternal Grandfather      kidney failure     Neurologic Disorder Maternal Grandmother      Alzheimer's     Blood Disease Father      recurrent DVT (twice)     Sleep Apnea Father      mother         Allergies   Allergen Reactions     Codeine GI Disturbance     BP Readings from Last 3 Encounters:   09/20/18 141/86   07/20/17 128/82   06/12/17 125/86    Wt Readings from Last 3 Encounters:   09/20/18 248 lb (112.5 kg)   07/20/17 246 lb (111.6 kg)   06/12/17 246 lb (111.6 kg)                    Reviewed and updated as needed this visit by clinical staff       Reviewed and updated as needed this visit by Provider         ROS:  Constitutional, HEENT, cardiovascular, pulmonary, GI, , musculoskeletal, neuro, skin, endocrine and psych systems are negative, except as otherwise noted.    OBJECTIVE:     /86 (BP Location: Left arm, Patient Position: Chair, Cuff Size: Adult Large)  Pulse 75  Temp 98  F (36.7  C) (Oral)  Resp 12  Wt 248 lb (112.5 kg)  SpO2 98%  BMI 35.08 kg/m2  Body mass index is 35.08 kg/(m^2).  GENERAL: healthy, alert and no distress  NECK: no adenopathy, no asymmetry, masses, or scars and thyroid normal to palpation  RESP: lungs clear to auscultation - no rales, rhonchi or wheezes  CV: regular rate and rhythm, normal S1 S2, no S3 or S4, no murmur, click or rub, no peripheral edema and peripheral pulses strong  MS: no gross musculoskeletal defects noted, no edema, wearing compression socks    ASSESSMENT/PLAN:     1. Snoring  Evaluate for sleep apnea, see orders  - SLEEP EVALUATION & MANAGEMENT REFERRAL - ADULT -[facility]  [phone] (Age 2 and up); Future    Recurrent PE/DVT: reiterated clots preventive measures while flying    Keesha Miranda MD  [facility]

## 2018-09-20 NOTE — MR AVS SNAPSHOT
After Visit Summary   9/20/2018    Cristo Dong    MRN: [id number]           Patient Information     Date Of Birth          1975        Visit Information        Provider Department      9/20/2018 2:20 PM Keesha Miranda MD [facility]        Today's Diagnoses     Snoring    -  1      Care Instructions      What Are Snoring and Obstructive Sleep Apnea?  If you ve ever had a stuffed-up nose, you know the feeling of trying to breathe through a very narrow passageway. This is what happens in your throat when you snore. While you sleep, structures in your throat partly block your air passage. This makes the passage narrow and hard to breathe through. In some cases the entire passage may become blocked so you can t breathe at all. Then you have obstructive sleep apnea.      Snoring Obstructive sleep apnea   Snoring  If your throat structures are too large or the muscles relax too much during sleep, the air passage may be partly blocked for a while (temporarily). But over time the air gets past. As air from the nose or mouth passes around this blockage, the throat structures vibrate. This causes the familiar sound of snoring. This sound can be loud. Snorers may wake up others, or even themselves, during the night. Snoring gets worse as more and more of the air passage is blocked.  Obstructive sleep apnea  If the structures partly or completely block the throat, air can t flow to the lungs at all. This is called hypopnea (decreased breathing) or apnea (meaning  no breathing ). The lungs aren t getting fresh air. So the brain tells the body to wake up just enough to tighten the muscles and unblock the air passage. With a loud gasp, breathing begins again. This process may be repeated over and over again during the night. This can make your sleep fragmented with lighter stages of sleep. You won t remember waking up many times during the night. But due to lighter sleep you will feel tired  the next day. The lack of sleep and fresh air can also strain your lungs, heart, and other organs. This leads to problems such as high blood pressure, heart attack, or stroke.  Problems in the nose and jaw  Problems in the structure of the nose may block breathing. A crooked (deviated) septum or swollen turbinates can make snoring worse or lead to apnea. Also, a receding jaw may make the tongue sit too far back. Then it s more likely to block the airway when you re asleep.        Date Last Reviewed: 8/1/2017 2000-2017 [employer]. [address] [place] [address]. All rights reserved. This information is not intended as a substitute for professional medical care. Always follow your healthcare professional's instructions.                Follow-ups after your visit        Additional Services     SLEEP EVALUATION & MANAGEMENT REFERRAL - [facility]  [phone] (Age 2 and up)       Please be aware that coverage of these services is subject to the terms and limitations of your health insurance plan.  Call member services at your health plan with any benefit or coverage questions.      Please bring the following to your appointment:    >>   List of current medications   >>   This referral request   >>   Any documents/labs given to you for this referral                      Future tests that were ordered for you today     Open Future Orders        Priority Expected Expires Ordered    SLEEP EVALUATION & MANAGEMENT REFERRAL - [facility]  [phone] (Age 2 and up) Routine  9/20/2019 9/20/2018            Who to contact     If you have questions or need follow up information about today's clinic visit or your schedule please contact [facility] directly at [phone].  Normal or non-critical lab and imaging results will be communicated to you by MyChart, letter or phone  within 4 business days after the clinic has received the results. If you do not hear from us within 7 days, please contact the clinic through [employer] or phone. If you have a critical or abnormal lab result, we will notify you by phone as soon as possible.  Submit refill requests through [employer] or call your pharmacy and they will forward the refill request to us. Please allow 3 business days for your refill to be completed.          Additional Information About Your Visit        [employer] Information     [employer] gives you secure access to your electronic health record. If you see a primary care provider, you can also send messages to your care team and make appointments. If you have questions, please call your primary care clinic.  If you do not have a primary care provider, please call [phone] and they will assist you.        Care EveryWhere ID     This is your Care EveryWhere ID. This could be used by other organizations to access your [place] medical records  [id number]        Your Vitals Were     Pulse Temperature Respirations Pulse Oximetry BMI (Body Mass Index)       75 98  F (36.7  C) (Oral) 12 98% 35.08 kg/m2        Blood Pressure from Last 3 Encounters:   09/20/18 141/86   07/20/17 128/82   06/12/17 125/86    Weight from Last 3 Encounters:   09/20/18 248 lb (112.5 kg)   07/20/17 246 lb (111.6 kg)   06/12/17 246 lb (111.6 kg)               Primary Care Provider Office Phone # Fax #    Keesha Corinna Miranda [phone]       [address] [facility] [address]        Equal Access to Services     WARNER GOETZ : Hadii trey ku hadasho [employer], waaxda luqadaha, qaybta kaalmada chadd, kellie borges . So [facility] [phone].    ATENCIÓN: Si habla español, tiene a robertson disposición servicios gratuitos de asistencia lingüística. Llame al [phone].    We comply with applicable federal civil rights laws and [place] laws. We do not discriminate on the basis of  race, color, national origin, age, disability, sex, sexual orientation, or gender identity.            Thank you!     Thank you for choosing [facility]  for your care. Our goal is always to provide you with excellent care. Hearing back from our patients is one way we can continue to improve our services. Please take a few minutes to complete the written survey that you may receive in the mail after your visit with us. Thank you!             Your Updated Medication List - Protect others around you: Learn how to safely use, store and throw away your medicines at www.disposemymeds.org.          This list is accurate as of 9/20/18  2:47 PM.  Always use your most recent med list.                   Brand Name Dispense Instructions for use Diagnosis    Fingertip Pulse Oximeter Misc     1 each    Use as needed to monitor pulse oxygen    Other acute pulmonary embolism without acute cor pulmonale (H)       order for DME     1 each    Equipment being ordered: Orthotics, bilateral, flat feet, one pair per year. Dispense one pair,    Foot pain       rivaroxaban ANTICOAGULANT 10 MG Tabs tablet    XARELTO    90 tablet    Take 1 tablet (10 mg) by mouth daily (with dinner)    Recurrent deep vein thrombosis (DVT) (H)

## 2018-09-20 NOTE — PATIENT INSTRUCTIONS
What Are Snoring and Obstructive Sleep Apnea?  If you ve ever had a stuffed-up nose, you know the feeling of trying to breathe through a very narrow passageway. This is what happens in your throat when you snore. While you sleep, structures in your throat partly block your air passage. This makes the passage narrow and hard to breathe through. In some cases the entire passage may become blocked so you can t breathe at all. Then you have obstructive sleep apnea.      Snoring Obstructive sleep apnea   Snoring  If your throat structures are too large or the muscles relax too much during sleep, the air passage may be partly blocked for a while (temporarily). But over time the air gets past. As air from the nose or mouth passes around this blockage, the throat structures vibrate. This causes the familiar sound of snoring. This sound can be loud. Snorers may wake up others, or even themselves, during the night. Snoring gets worse as more and more of the air passage is blocked.  Obstructive sleep apnea  If the structures partly or completely block the throat, air can t flow to the lungs at all. This is called hypopnea (decreased breathing) or apnea (meaning  no breathing ). The lungs aren t getting fresh air. So the brain tells the body to wake up just enough to tighten the muscles and unblock the air passage. With a loud gasp, breathing begins again. This process may be repeated over and over again during the night. This can make your sleep fragmented with lighter stages of sleep. You won t remember waking up many times during the night. But due to lighter sleep you will feel tired the next day. The lack of sleep and fresh air can also strain your lungs, heart, and other organs. This leads to problems such as high blood pressure, heart attack, or stroke.  Problems in the nose and jaw  Problems in the structure of the nose may block breathing. A crooked (deviated) septum or swollen turbinates can make snoring worse or  lead to apnea. Also, a receding jaw may make the tongue sit too far back. Then it s more likely to block the airway when you re asleep.        Date Last Reviewed: 8/1/2017 2000-2017 [employer]. [address] [place] [address], [place] [address]. All rights reserved. This information is not intended as a substitute for professional medical care. Always follow your healthcare professional's instructions.

## 2018-10-11 ENCOUNTER — PRE VISIT (OUTPATIENT)
Dept: SLEEP MEDICINE | Facility: CLINIC | Age: 43
End: 2018-10-11

## 2018-10-11 NOTE — TELEPHONE ENCOUNTER
"  1.  Reason for the visit:  Consult to discuss snoring  2.  [facility]  3.  Previous Sleep Doctor or Pulmonlogist (clinic name)?  None  4.  Records, Procedures, Imaging, and Labs (see below)  No records to obtain        All NOTES from previous office visits that pertain to why they are being seen in the Sleep Center    Previous Sleep Studies, Chest CT, Echos and reports that pertain to why they are seeing Sleep Center    All Sleep records that have been done in the last 2 years that pertain to why they are seeing Sleep Center            Are they being seen for continuation of care for Cpap/Bipap/Avap/Trilogy/Dental Device?     If yes to above Who and Where was Device issued/currently getting supplies from?     Are you currently on \"Supplemental Oxygen\" during the day or night?                                                                                                                                                         Please remind pt to bring Cpap machine and ask to arrive 15 minutes early to appointment due traffic and congestion                                                 5. Pt Sleep Center Packet received Pt will complete and bring to appt    Yes: \"please make sure that you bring this to your appointment completed, either the doctor will not see you until this completed or you may be asked to reschedule your appointment.\"     No: mail or email to the pt and explain, \"please make sure that you bring this to your appointment completed, either the doctor will not see you until this completed or you may be asked to reschedule your appointment.\"     ~If pt coming early to fill packet out, ask that they come 30 minutes prior to their appointment~     6. Has the pt's medication list been updated and preferred pharmacy added?     7. Has the allergy list been reviewed?    \"Thank you for choosing [facility] and we look forward " "to seeing you at your upcoming appointment\"     "

## 2018-10-14 ENCOUNTER — PRE VISIT (OUTPATIENT)
Dept: SLEEP MEDICINE | Facility: CLINIC | Age: 43
End: 2018-10-14

## 2018-10-15 ENCOUNTER — OFFICE VISIT (OUTPATIENT)
Dept: SLEEP MEDICINE | Facility: CLINIC | Age: 43
End: 2018-10-15
Payer: COMMERCIAL

## 2018-10-15 VITALS
OXYGEN SATURATION: 97 % | WEIGHT: 250 LBS | RESPIRATION RATE: 16 BRPM | BODY MASS INDEX: 40.18 KG/M2 | HEIGHT: 66 IN | SYSTOLIC BLOOD PRESSURE: 138 MMHG | HEART RATE: 66 BPM | DIASTOLIC BLOOD PRESSURE: 89 MMHG

## 2018-10-15 DIAGNOSIS — G47.8 UNHEALTHY SLEEP HABIT: ICD-10-CM

## 2018-10-15 DIAGNOSIS — R06.83 SNORING: ICD-10-CM

## 2018-10-15 DIAGNOSIS — E66.813 CLASS 3 SEVERE OBESITY WITH BODY MASS INDEX (BMI) OF 40.0 TO 44.9 IN ADULT, UNSPECIFIED OBESITY TYPE, UNSPECIFIED WHETHER SERIOUS COMORBIDITY PRESENT (H): Primary | ICD-10-CM

## 2018-10-15 DIAGNOSIS — E66.01 CLASS 3 SEVERE OBESITY WITH BODY MASS INDEX (BMI) OF 40.0 TO 44.9 IN ADULT, UNSPECIFIED OBESITY TYPE, UNSPECIFIED WHETHER SERIOUS COMORBIDITY PRESENT (H): Primary | ICD-10-CM

## 2018-10-15 PROCEDURE — 99205 OFFICE O/P NEW HI 60 MIN: CPT | Performed by: NURSE PRACTITIONER

## 2018-10-15 NOTE — TELEPHONE ENCOUNTER
PEREZ:      Pt will wake up gasping    Family history of sleep apnea in both parents; both had CPAP    He'd been dreaming he was squeezed by a snake    He's been swimming for exercise in the past year and has had greatly improved lung capacity    He snores occasionally, no apneic episodes noted, but he does have frequent night waking    He denies morning headaches  He falls asleep easily  Pulmonary embolism and infarction (H)      Flat feet     Varicosities of leg     External hemorrhoids     Bunion, left     Bunion, right     Acute deep vein thrombosis (DVT) of tibial vein of right lower extremity (H)     Acute deep vein thrombosis (DVT) of popliteal vein of right lower extremity (H)     Other acute pulmonary embolism without acute cor pulmonale (H)     Acute left ankle pain     MRSA infection     Recurrent deep vein thrombosis (DVT) (H)     History of dysplastic nevus     Snoring

## 2018-10-15 NOTE — PROGRESS NOTES
Allergies:    Allergies   Allergen Reactions     Codeine GI Disturbance       Medications:    Current Outpatient Prescriptions   Medication Sig Dispense Refill     Misc. Devices (FINGERTIP PULSE OXIMETER) MISC Use as needed to monitor pulse oxygen 1 each 1     ORDER FOR DME Equipment being ordered: Orthotics, bilateral, flat feet, one pair per year.  Dispense one pair, 1 each 0     rivaroxaban ANTICOAGULANT (XARELTO) 10 MG TABS tablet Take 1 tablet (10 mg) by mouth daily (with dinner) 90 tablet 3       Problem List:  Patient Active Problem List    Diagnosis Date Noted     Snoring 09/20/2018     Priority: Medium     History of dysplastic nevus 12/08/2017     Priority: Medium     Recurrent deep vein thrombosis (DVT) (H) 09/19/2017     Priority: Medium     MRSA infection 08/31/2017     Priority: Medium     Acute left ankle pain 08/17/2017     Priority: Medium     Acute deep vein thrombosis (DVT) of tibial vein of right lower extremity (H) 05/30/2017     Priority: Medium     Acute deep vein thrombosis (DVT) of popliteal vein of right lower extremity (H) 05/30/2017     Priority: Medium     Other acute pulmonary embolism without acute cor pulmonale (H) 05/30/2017     Priority: Medium     Bunion, left 02/21/2017     Priority: Medium     Bunion, right 02/21/2017     Priority: Medium     External hemorrhoids 06/30/2015     Priority: Medium     Varicosities of leg 12/18/2013     Priority: Medium     Flat feet 08/22/2012     Priority: Medium     Pulmonary embolism and infarction (H) 09/01/2001     Priority: Medium     Pt was on coumadin for 5 years  Problem list name updated by automated process. Provider to review          Past Medical/Surgical History:  Past Medical History:   Diagnosis Date     Other pulmonary embolism and infarction 9/2001    Pt was on coumadin for 5 years     Thrombosed hemorrhoids      Past Surgical History:   Procedure Laterality Date     C HAND/FINGER SURGERY UNLISTED      RT HAND INDEX FINGER NERVE  "DAMAGE     C NERVE GRAFT,HAND/FOOT,ONE,=<4CM  1990    Nerve Graft - Right Hand     C REPAIR CRUCIATE LIGAMENT,KNEE  1991    Right Knee           Physical Examination:  Vitals: /89  Pulse 66  Resp 16  Ht 1.679 m (5' 6.1\")  Wt 113.4 kg (250 lb)  SpO2 97%  BMI 40.23 kg/m2  BMI= Body mass index is 40.23 kg/(m^2).    Neck Cir (cm): 46 cm    Dalton Total Score 10/15/2018   Total score - Dalton 4       GENERAL APPEARANCE: healthy, alert and no distress  EYES: Eyes grossly normal to inspection  HENT: oropharynx crowded, uvula elongated, soft palate dependent, tongue base enlarged and NARES WITH MILD CONGESTION ON R, L nares with turbinate enlargement  NECK: thyroid normal to palpation  RESP: lungs clear to auscultation - no rales, rhonchi or wheezes    CV: regular rates and rhythm, normal S1 S2, no S3 or S4 and no murmur, click or rub  Extremities are without clubbing,+RLE edema  Musculoskeletal: Moves without difficulty  Mallampati Class: IV.  Tonsillar Stage: 1  hidden by pillars.  Dental: Dentition in good condition. Underbite with good advancement of lower jaw without tmd  Skin: without facial rash        CC: Keesha Miranda"

## 2018-10-15 NOTE — MR AVS SNAPSHOT
"              After Visit Summary   10/15/2018    Cristo Dong    MRN: [id number]           Patient Information     Date Of Birth          1975        Visit Information        Provider Department      10/15/2018 3:00 PM Alissa Burns APRN CNP [facility]        Today's Diagnoses     Snoring          Care Instructions    MY TREATMENT INFORMATION FOR SLEEP APNEA-  Cristo Dong    NP : Alissa Burns  SLEEP CENTER :   [facility]   MY CONTACT NUMBER: [phone]    Home study,  Follow up with me.    Am I having a sleep study at a sleep center?  Make sure you have an appointment for the study before you leave!    Am I having a home sleep study?  Watch this video:  [email or web address].com/watch?v=[email or web address]_[email or web address]  Please verify your insurance coverage with your insurance carrier    Frequently asked questions:  1. What is Obstructive Sleep Apnea (REJI)? REJI is the most common type of sleep apnea. Apnea means, \"without breath.\"  Apnea is most often caused by narrowing or collapse of the upper airway as muscles relax during sleep.   Almost everyone has occasional apneas. Most people with sleep apnea have had brief interruptions at night frequently for many years.  The severity of sleep apnea is related to how frequent and severe the events are.   2. What are the consequences of REJI? Symptoms include: feeling sleepy during the day, snoring loudly, gasping or stopping of breathing, trouble sleeping, and occasionally morning headaches or heartburn at night.  Sleepiness can be serious and even increase the risk of falling asleep while driving. Other health consequences may include development of high blood pressure and other cardiovascular disease in persons who are susceptible. Untreated REJI  can contribute to heart disease, stroke and diabetes.   3. What are the treatment options? In most situations, sleep apnea is a lifelong disease that must be " managed with daily therapy. Medications are not effective for sleep apnea and surgery is generally not considered until other therapies have been tried. Your treatment is your choice . Continuous Positive Airway (CPAP) works right away and is the therapy that is effective in nearly everyone. An oral device to hold your jaw forward is usually the next most reliable option. Other options include postioning devices (to keep you off your back), weight loss, and surgery including a tongue pacing device. There is more detail about some of these options below.    Important tips for using CPAP and similar devices   Know your equipment:  CPAP is continuous positive airway pressure that prevents obstructive sleep apnea by keeping the throat from collapsing while you are sleeping. In most cases, the device is  smart  and can slowly self-adjusts if your throat collapses and keeps a record every day of how well you are treated-this information is available to you and your care team.  BPAP is bilevel positive airway pressure that keeps your throat open and also assists each breath with a pressure boost to maintain adequate breathing.  Special kinds of BPAP are used in patients who have inadequate breathing from lung or heart disease. In most cases, the device is  smart  and can slowly self-adjusts to assist breathing. Like CPAP, the device keeps a record of how well you are treated.  Your mask is your connection to the device. You get to choose what feels most comfortable and the staff will help to make sure if fits. Here: are some examples of the different masks that are available:       Key points to remember on your journey with sleep apnea:  1. Sleep study.  PAP devices often need to be adjusted during a sleep study to show that they are effective and adjusted right.  2. Good tips to remember: Try wearing just the mask during a quiet time during the day so your body adapts to wearing it. A humidifier is recommended for  comfort in most cases to prevent drying of your nose and throat. Allergy medication from your provider may help you if you are having nasal congestion.  3. Getting settled-in. It takes more than one night for most of us to get used to wearing a mask. Try wearing just the mask during a quiet time during the day so your body adapts to wearing it. A humidifier is recommended for comfort in most cases. Our team will work with you carefully on the first day and will be in contact within 4 days and again at 2 and 4 weeks for advice and remote device adjustments. Your therapy is evaluated by the device each day.   4. Use it every night. The more you are able to sleep naturally for 7-8 hours, the more likely you will have good sleep and to prevent health risks or symptoms from sleep apnea. Even if you use it 4 hours it helps. Occasionally all of us are unable to use a medical therapy, in sleep apnea, it is not dangerous to miss one night.   5. Communicate. Call our skilled team on the number provided on the first day if your visit for problems that make it difficult to wear the device. Over 2 out of 3 patients can learn to wear the device long-term with help from our team. Remember to call our team or your sleep providers if you are unable to wear the device as we may have other solutions for those who cannot adapt to mask CPAP therapy. It is recommended that you sleep your sleep provider within the first 3 months and yearly after that if you are not having problems.   Take care of your equipment. Make sure you clean your mask and tubing using directions every day and that your filter and mask are replaced as recommended or if they are not working.     BESIDES CPAP, WHAT OTHER THERAPIES ARE THERE?    Positioning Device  Positioning devices are generally used when sleep apnea is mild and only occurs on your back.This example shows a pillow that straps around the waist. It may be appropriate for those whose sleep study shows  milder sleep apnea that occurs primarily when lying flat on one's back. Preliminary studies have shown benefit but effectiveness at home may need to be verified by a home sleep test. These devices are generally not covered by medical insurance.  Examples of devices that maintain sleeping on the back to prevent snoring and mild sleep apnea.    Belt type body positioner  Http://[employer]    Electronic reminder  Http://nightshifttherapy.com/  Http://www.[employer]au/    Oral Appliance  What is oral appliance therapy?  An oral appliance device fits on your teeth at night like a retainer used after having braces. The device is made by a specialized dentist and requires several visits over 1-2 months before a manufactured device is made to fit your teeth and is adjusted to prevent your sleep apnea. Once an oral device is working properly, snoring should be improved. A home sleep test may be recommended at that time if to determine whether the sleep apnea is adequately treated.       Some things to remember:  -Oral devices are often, but not always, covered by your medical insurance. Be sure to check with your insurance provider.   -If you are referred for oral therapy, you will be given a list of specialized dentists to consider or you may choose to visit the Web site of the American Academy of Dental Sleep Medicine  -Oral devices are less likely to work if you have severe sleep apnea or are extremely overweight.     More detailed information  An oral appliance is a small acrylic device that fits over the upper and lower teeth  (similar to a retainer or a mouth guard). This device slightly moves jaw forward, which moves the base of the tongue forward, opens the airway, improves breathing for effective treat snoring and obstructive sleep apnea in perhaps 7 out of 10 people .  The best working devices are custom-made by a dental device  after a mold is made of the teeth 1, 2, 3.  When is an oral appliance  indicated?  Oral appliance therapy is recommended as a first-line treatment for patients with primary snoring, mild sleep apnea, and for patients with moderate sleep apnea who prefer appliance therapy to use of CPAP4, 5. Severity of sleep apnea is determined by sleep testing and is based on the number of respiratory events per hour of sleep.   How successful is oral appliance therapy?  The success rate of oral appliance therapy in patients with mild sleep apnea is 75-80% while in patients with moderate sleep apnea it is 50-70%. The chance of success in patients with severe sleep apnea is 40-50%. The research also shows that oral appliances have a beneficial effect on the cardiovascular health of REJI patients at the same magnitude as CPAP therapy7.  Oral appliances should be a second-line treatment in cases of severe sleep apnea, but if not completely successful then a combination therapy utilizing CPAP plus oral appliance therapy may be effective. Oral appliances tend to be effective in a broad range of patients although studies show that the patients who have the highest success are females, younger patients, those with milder disease, and less severe obesity. 3, 6.   Finding a dentist that practices dental sleep medicine  Specific training is available through the American Academy of Dental Sleep Medicine for dentists interested in working in the field of sleep. To find a dentist who is educated in the field of sleep and the use of oral appliances, near you, visit the Web site of the American Academy of Dental Sleep Medicine.    References  1. Stephanie et al. Objectively measured vs self-reported compliance during oral appliance therapy for sleep-disordered breathing. Chest 2013; 144(5): 3151-7081.  2. Siobhan, et al. Objective measurement of compliance during oral appliance therapy for sleep-disordered breathing. Thorax 2013; 68(1): 91-96.  3. Avni et al. Mandibular advancement devices in 620 men and  women with REJI and snoring: tolerability and predictors of treatment success. Chest 2004; 125: 6276-1294.  4. Collins et al. Oral appliances for snoring and REJI: a review. Sleep 2006; 29: 244-262.  5. Karla et al. Oral appliance treatment for REJI: an update. J Clin Sleep Med 2014; 10(2): 215-227.  6. Elpidio et al. Predictors of OSAH treatment outcome. J Dent Res 2007; 86: 0550-9525.      Weight Loss:    Weight loss is a long-term strategy that may improve sleep apnea in some patients.    Weight management is a personal decision and the decision should be based on your interest and the potential benefits.  If you are interested in exploring weight loss strategies, the following discussion covers the impact on weight loss on sleep apnea and the approaches that may be successful.    Being overweight does not necessarily mean you will have health consequences.  Those who have BMI over 35 or over 27 with existing medical conditions carries greater risk.   Weight loss decreases severity of sleep apnea in most people with obesity. For those with mild obesity who have developed snoring with weight gain, even 15-30 pound weight loss can improve and occasionally eliminate sleep apnea.  Structured and life-long dietary and health habits are necessary to lose weight and keep healthier weight levels.     Though there may be significant health benefits from weight loss, long-term weight loss is very difficult to achieve- studies show success with dietary management in less than 10% of people. In addition, substantial weight loss may require years of dietary control and may be difficult if patients have severe obesity. In these cases, surgical management may be considered.  Finally, older individuals who have tolerated obesity without health complications may be less likely to benefit from weight loss strategies.      [unfilled]    Surgery:    Surgery for obstructive sleep apnea is considered generally only when other  therapies fail to work. Surgery may be discussed with you if you are having a difficult time tolerating CPAP and or when there is an abnormal structure that requires surgical correction.  Nose and throat surgeries often enlarge the airway to prevent collapse.  Most of these surgeries create pain for 1-2 weeks and up to half of the most common surgeries are not effective throughout life.  You should carefully discuss the benefits and drawbacks to surgery with your sleep provider and surgeon to determine if it is the best solution for you.   More information  Surgery for REJI is directed at areas that are responsible for narrowing or complete obstruction of the airway during sleep.  There are a wide range of procedures available to enlarge and/or stabilize the airway to prevent blockage of breathing in the three major areas where it can occur: the palate, tongue, and nasal regions.  Successful surgical treatment depends on the accurate identification of the factors responsible for obstructive sleep apnea in each person.  A personalized approach is required because there is no single treatment that works well for everyone.  Because of anatomic variation, consultation with an examination by a sleep surgeon is a critical first step in determining what surgical options are best for each patient.  In some cases, examination during sedation may be recommended in order to guide the selection of procedures.  Patients will be counseled about risks and benefits as well as the typical recovery course after surgery. Surgery is typically not a cure for a person s REJI.  However, surgery will often significantly improve one s REJI severity (termed  success rate ).  Even in the absence of a cure, surgery will decrease the cardiovascular risk associated with OSA7; improve overall quality of life8 (sleepiness, functionality, sleep quality, etc).      Palate Procedures:  Patients with REIJ often have narrowing of their airway in the region  of their tonsils and uvula.  The goals of palate procedures are to widen the airway in this region as well as to help the tissues resist collapse.  Modern palate procedure techniques focus on tissue conservation and soft tissue rearrangement, rather than tissue removal.  Often the uvula is preserved in this procedure. Residual sleep apnea is common in patient after pharyngoplasty with an average reduction in sleep apnea events of 33%2.      Tongue Procedures:  ExamWhile patients are awake, the muscles that surround the throat are active and keep this region open for breathing. These muscles relax during sleep, allowing the tongue and other structures to collapse and block breathing.  There are several different tongue procedures available.  Selection of a tongue base procedure depends on characteristics seen on physical exam.  Generally, procedures are aimed at removing bulky tissues in this area or preventing the back of the tongue from falling back during sleep.  Success rates for tongue surgery range from 50-62%3.    Hypoglossal Nerve Stimulation:  Hypoglossal nerve stimulation has recently received approval from the [place] Food and Drug Administration for the treatment of obstructive sleep apnea.  This is based on research showing that the system was safe and effective in treating sleep apnea6.  Results showed that the median AHI score decreased 68%, from 29.3 to 9.0. This therapy uses an implant system that senses breathing patterns and delivers mild stimulation to airway muscles, which keeps the airway open during sleep.  The system consists of three fully implanted components: a small generator (similar in size to a pacemaker), a breathing sensor, and a stimulation lead.  Using a small handheld remote, a patient turns the therapy on before bed and off upon awakening.    Candidates for this device must be greater than 22 years of age, have moderate to severe REJI (AHI between 20-65), BMI less than 32,  have tried CPAP/oral appliance without success, and have appropriate upper airway anatomy (determined by a sleep endoscopy performed by Dr. Yates).    Hypoglossal Nerve Stimulation Pathway:    The sleep surgeon s office will work with the patient through the insurance prior-authorization process (including communications and appeals).    Nasal Procedures:  Nasal obstruction can interfere with nasal breathing during the day and night.  Studies have shown that relief of nasal obstruction can improve the ability of some patients to tolerate positive airway pressure therapy for obstructive sleep apnea1.  Treatment options include medications such as nasal saline, topical corticosteroid and antihistamine sprays, and oral medications such as antihistamines or decongestants. Non-surgical treatments can include external nasal dilators for selected patients. If these are not successful by themselves, surgery can improve the nasal airway either alone or in combination with these other options.      Combination Procedures:  Combination of surgical procedures and other treatments may be recommended, particularly if patients have more than one area of narrowing or persistent positional disease.  The success rate of combination surgery ranges from 66-80%2,3.    References  1. Becca CASTRO. The Role of the Nose in Snoring and Obstructive Sleep Apnoea: An Update.  Eur Arch Otorhinolaryngol. 2011; 268: 1365-73.  2.  Caplottie SM; Chandler JA; Aristeo JR; Pallanch JF; Cayla MB; Wes SG; Naseem REYNA. Surgical modifications of the upper airway for obstructive sleep apnea in adults: a systematic review and meta-analysis. SLEEP 2010;33(10):3995-0681. Adrianna HARRY. Hypopharyngeal surgery in obstructive sleep apnea: an evidence-based medicine review.  Arch Otolaryngol Head Neck Surg. 2006 Feb;132(2):206-13.  3. Brock CACERESH1, Virgilio Y, Newton AAMIR. The efficacy of anatomically based multilevel surgery for obstructive sleep apnea. Otolaryngol Head Neck  Surg. 2003 Oct;129(4):327-35.  4. Kezirian E, Goldberg A. Hypopharyngeal Surgery in Obstructive Sleep Apnea: An Evidence-Based Medicine Review. Arch Otolaryngol Head Neck Surg. 2006 Feb;132(2):206-13.  5. Jude ANDERSON et al. Upper-Airway Stimulation for Obstructive Sleep Apnea.  N Engl J Med. 2014 Jan 9;370(2):139-49.  6. Danuta Y et al. Increased Incidence of Cardiovascular Disease in Middle-aged Men with Obstructive Sleep Apnea. Am J Respir Crit Care Med; 2002 166: 159-165  7. Armas EM et al. Studying Life Effects and Effectiveness of Palatopharyngoplasty (SLEEP) study: Subjective Outcomes of Isolated Uvulopalatopharyngoplasty. Otolaryngol Head Neck Surg. 2011; 144: 623-631.              Your BMI is Body mass index is 40.23 kg/(m^2).  Weight management is a personal decision.  If you are interested in exploring weight loss strategies, the following discussion covers the approaches that may be successful. Body mass index (BMI) is one way to tell whether you are at a healthy weight, overweight, or obese. It measures your weight in relation to your height.  A BMI of 18.5 to 24.9 is in the healthy range. A person with a BMI of 25 to 29.9 is considered overweight, and someone with a BMI of 30 or greater is considered obese. More than two-thirds of American adults are considered overweight or obese.  Being overweight or obese increases the risk for further weight gain. Excess weight may lead to heart disease and diabetes.  Creating and following plans for healthy eating and physical activity may help you improve your health.  Weight control is part of healthy lifestyle and includes exercise, emotional health, and healthy eating habits. Careful eating habits lifelong are the mainstay of weight control. Though there are significant health benefits from weight loss, long-term weight loss with diet alone may be very difficult to achieve- studies show long-term success with dietary management in less than 10% of people.  Attaining a healthy weight may be especially difficult to achieve in those with severe obesity. In some cases, medications, devices and surgical management might be considered.  What can you do?  If you are overweight or obese and are interested in methods for weight loss, you should discuss this with your provider.     Consider reducing daily calorie intake by 500 calories.     Keep a food journal.     Avoiding skipping meals, consider cutting portions instead.    Diet combined with exercise helps maintain muscle while optimizing fat loss. Strength training is particularly important for building and maintaining muscle mass. Exercise helps reduce stress, increase energy, and improves fitness. Increasing exercise without diet control, however, may not burn enough calories to loose weight.       Start walking three days a week 10-20 minutes at a time    Work towards walking thirty minutes five days a week     Eventually, increase the speed of your walking for 1-2 minutes at time    In addition, we recommend that you review healthy lifestyles and methods for weight loss available through the National Institutes of Health patient information sites:  http://win.niddk.nih.gov/publications/index.htm    And look into health and wellness programs that may be available through your health insurance provider, employer, local community center, or bereket club.    Weight management plan: Patient was referred to their PCP to discuss a diet and exercise plan.              Follow-ups after your visit        Follow-up notes from your care team     Return for hst, follow up with me.      Your next 10 appointments already scheduled     Oct 29, 2018  2:00 PM CDT   HST  with SLEEP STUDY [address] [facility] ([facility])    [address] [place] [id number] [phone]            Oct 30, 2018  9:00 AM CDT   HST Drop Off with SLEEP STUDY [address]  [facility] ([facility])    [address] [place] [id number] [phone]            Nov 12, 2018  4:15 PM CST   Telephone Visit with EMMA Vela CNP   [facility] ([facility])    [address] [place] [id number] [phone]           Note: this is not an onsite visit; there is no need to come to the facility.              Future tests that were ordered for you today     Open Future Orders        Priority Expected Expires Ordered    HST-Home Sleep Apnea Test Routine  4/16/2019 10/15/2018            Who to contact     If you have questions or need follow up information about today's clinic visit or your schedule please contact [facility] directly at [phone].  Normal or non-critical lab and imaging results will be communicated to you by MyChart, letter or phone within 4 business days after the clinic has received the results. If you do not hear from us within 7 days, please contact the clinic through [employer] or phone. If you have a critical or abnormal lab result, we will notify you by phone as soon as possible.  Submit refill requests through [employer] or call your pharmacy and they will forward the refill request to us. Please allow 3 business days for your refill to be completed.          Additional Information About Your Visit        MyChart Information     [employer] gives you secure access to your electronic health record. If you see a primary care provider, you can also send messages to your care team and make appointments. If you have questions, please call your primary care clinic.  If you do not have a primary care provider, please call [phone] and they will assist you.        Care EveryWhere ID     This is your Care EveryWhere ID. This could be used by other organizations to access your [facility]  "records  [id number]        Your Vitals Were     Pulse Respirations Height Pulse Oximetry BMI (Body Mass Index)       66 16 1.679 m (5' 6.1\") 97% 40.23 kg/m2        Blood Pressure from Last 3 Encounters:   10/15/18 138/89   09/20/18 141/86   07/20/17 128/82    Weight from Last 3 Encounters:   10/15/18 113.4 kg (250 lb)   09/20/18 112.5 kg (248 lb)   07/20/17 111.6 kg (246 lb)              We Performed the Following     SLEEP EVALUATION & MANAGEMENT REFERRAL - ADULT -[facility]  [phone] (Age 2 and up)        Primary Care Provider Office Phone # Fax #    Keesha Miranda [phone] [id number]  [facility] [address]        Equal Access to Services     WARNER GOETZ : Hadii aad ku hadasho Soomaali, waaxda luqadaha, qaybta kaalmada adeegyada, waxay idiin hayaan gwendolyn borges . So [facility] [phone].    ATENCIÓN: Si habla español, tiene a robertson disposición servicios gratuitos de asistencia lingüística. Lela al [phone].    We comply with applicable federal civil rights laws and [place] laws. We do not discriminate on the basis of race, color, national origin, age, disability, sex, sexual orientation, or gender identity.            Thank you!     Thank you for choosing [facility]  for your care. Our goal is always to provide you with excellent care. Hearing back from our patients is one way we can continue to improve our services. Please take a few minutes to complete the written survey that you may receive in the mail after your visit with us. Thank you!             Your Updated Medication List - Protect others around you: Learn how to safely use, store and throw away your medicines at www.disposemymeds.org.          This list is accurate as of 10/15/18  4:08 PM.  Always use your most recent med list.                   Brand Name Dispense Instructions for use Diagnosis    Fingertip Pulse Oximeter Misc "     1 each    Use as needed to monitor pulse oxygen    Other acute pulmonary embolism without acute cor pulmonale (H)       order for DME     1 each    Equipment being ordered: Orthotics, bilateral, flat feet, one pair per year. Dispense one pair,    Foot pain       rivaroxaban ANTICOAGULANT 10 MG Tabs tablet    XARELTO    90 tablet    Take 1 tablet (10 mg) by mouth daily (with dinner)    Recurrent deep vein thrombosis (DVT) (H)

## 2018-10-15 NOTE — NURSING NOTE
"    Chief Complaint   Patient presents with     Consult     Feels breathless at times and seems to wake up every night at about 3:00 AM       Initial /89  Pulse 66  Resp 16  Ht 1.679 m (5' 6.1\")  Wt 113.4 kg (250 lb)  SpO2 97%  BMI 40.23 kg/m2 Estimated body mass index is 40.23 kg/(m^2) as calculated from the following:    Height as of this encounter: 1.679 m (5' 6.1\").    Weight as of this encounter: 113.4 kg (250 lb).    Medication Reconciliation: complete    Neck circumference: 18 inches / 45.5 centimeters.    DME:     Radha Corley [facility] [place] [facility]       "

## 2018-10-15 NOTE — PATIENT INSTRUCTIONS
"MY TREATMENT INFORMATION FOR SLEEP APNEA-  Cristo Dong    NP : Alissa Echeverria [place]  SLEEP CENTER :   [facility]   MY CONTACT NUMBER: [phone]    Home study,  Follow up with me.    Am I having a sleep study at a sleep center?  Make sure you have an appointment for the study before you leave!    Am I having a home sleep study?  Watch this video:  [email or web address]/watch?[email or web address]  Please verify your insurance coverage with your insurance carrier    Frequently asked questions:  1. What is Obstructive Sleep Apnea (REJI)? REJI is the most common type of sleep apnea. Apnea means, \"without breath.\"  Apnea is most often caused by narrowing or collapse of the upper airway as muscles relax during sleep.   Almost everyone has occasional apneas. Most people with sleep apnea have had brief interruptions at night frequently for many years.  The severity of sleep apnea is related to how frequent and severe the events are.   2. What are the consequences of REJI? Symptoms include: feeling sleepy during the day, snoring loudly, gasping or stopping of breathing, trouble sleeping, and occasionally morning headaches or heartburn at night.  Sleepiness can be serious and even increase the risk of falling asleep while driving. Other health consequences may include development of high blood pressure and other cardiovascular disease in persons who are susceptible. Untreated REJI  can contribute to heart disease, stroke and diabetes.   3. What are the treatment options? In most situations, sleep apnea is a lifelong disease that must be managed with daily therapy. Medications are not effective for sleep apnea and surgery is generally not considered until other therapies have been tried. Your treatment is your choice . Continuous Positive Airway (CPAP) works right away and is the therapy that is effective in nearly everyone. An oral device to hold your jaw forward is usually the next most " reliable option. Other options include postioning devices (to keep you off your back), weight loss, and surgery including a tongue pacing device. There is more detail about some of these options below.    Important tips for using CPAP and similar devices   Know your equipment:  CPAP is continuous positive airway pressure that prevents obstructive sleep apnea by keeping the throat from collapsing while you are sleeping. In most cases, the device is  smart  and can slowly self-adjusts if your throat collapses and keeps a record every day of how well you are treated-this information is available to you and your care team.  BPAP is bilevel positive airway pressure that keeps your throat open and also assists each breath with a pressure boost to maintain adequate breathing.  Special kinds of BPAP are used in patients who have inadequate breathing from lung or heart disease. In most cases, the device is  smart  and can slowly self-adjusts to assist breathing. Like CPAP, the device keeps a record of how well you are treated.  Your mask is your connection to the device. You get to choose what feels most comfortable and the staff will help to make sure if fits. Here: are some examples of the different masks that are available:       Key points to remember on your journey with sleep apnea:  1. Sleep study.  PAP devices often need to be adjusted during a sleep study to show that they are effective and adjusted right.  2. Good tips to remember: Try wearing just the mask during a quiet time during the day so your body adapts to wearing it. A humidifier is recommended for comfort in most cases to prevent drying of your nose and throat. Allergy medication from your provider may help you if you are having nasal congestion.  3. Getting settled-in. It takes more than one night for most of us to get used to wearing a mask. Try wearing just the mask during a quiet time during the day so your body adapts to wearing it. A humidifier is  recommended for comfort in most cases. Our team will work with you carefully on the first day and will be in contact within 4 days and again at 2 and 4 weeks for advice and remote device adjustments. Your therapy is evaluated by the device each day.   4. Use it every night. The more you are able to sleep naturally for 7-8 hours, the more likely you will have good sleep and to prevent health risks or symptoms from sleep apnea. Even if you use it 4 hours it helps. Occasionally all of us are unable to use a medical therapy, in sleep apnea, it is not dangerous to miss one night.   5. Communicate. Call our skilled team on the number provided on the first day if your visit for problems that make it difficult to wear the device. Over 2 out of 3 patients can learn to wear the device long-term with help from our team. Remember to call our team or your sleep providers if you are unable to wear the device as we may have other solutions for those who cannot adapt to mask CPAP therapy. It is recommended that you sleep your sleep provider within the first 3 months and yearly after that if you are not having problems.   Take care of your equipment. Make sure you clean your mask and tubing using directions every day and that your filter and mask are replaced as recommended or if they are not working.     BESIDES CPAP, WHAT OTHER THERAPIES ARE THERE?    Positioning Device  Positioning devices are generally used when sleep apnea is mild and only occurs on your back.This example shows a pillow that straps around the waist. It may be appropriate for those whose sleep study shows milder sleep apnea that occurs primarily when lying flat on one's back. Preliminary studies have shown benefit but effectiveness at home may need to be verified by a home sleep test. These devices are generally not covered by medical insurance.  Examples of devices that maintain sleeping on the back to prevent snoring and mild sleep apnea.    Belt type body  positioner  Http://InsideSales.com.Meaningo/    Electronic reminder  Http://nightshifttherapy.com/  Http://www.StrategyEye.com.au/    Oral Appliance  What is oral appliance therapy?  An oral appliance device fits on your teeth at night like a retainer used after having braces. The device is made by a specialized dentist and requires several visits over 1-2 months before a manufactured device is made to fit your teeth and is adjusted to prevent your sleep apnea. Once an oral device is working properly, snoring should be improved. A home sleep test may be recommended at that time if to determine whether the sleep apnea is adequately treated.       Some things to remember:  -Oral devices are often, but not always, covered by your medical insurance. Be sure to check with your insurance provider.   -If you are referred for oral therapy, you will be given a list of specialized dentists to consider or you may choose to visit the Web site of the American Academy of Dental Sleep Medicine  -Oral devices are less likely to work if you have severe sleep apnea or are extremely overweight.     More detailed information  An oral appliance is a small acrylic device that fits over the upper and lower teeth  (similar to a retainer or a mouth guard). This device slightly moves jaw forward, which moves the base of the tongue forward, opens the airway, improves breathing for effective treat snoring and obstructive sleep apnea in perhaps 7 out of 10 people .  The best working devices are custom-made by a dental device  after a mold is made of the teeth 1, 2, 3.  When is an oral appliance indicated?  Oral appliance therapy is recommended as a first-line treatment for patients with primary snoring, mild sleep apnea, and for patients with moderate sleep apnea who prefer appliance therapy to use of CPAP4, 5. Severity of sleep apnea is determined by sleep testing and is based on the number of respiratory events per hour of sleep.   How successful  is oral appliance therapy?  The success rate of oral appliance therapy in patients with mild sleep apnea is 75-80% while in patients with moderate sleep apnea it is 50-70%. The chance of success in patients with severe sleep apnea is 40-50%. The research also shows that oral appliances have a beneficial effect on the cardiovascular health of REJI patients at the same magnitude as CPAP therapy7.  Oral appliances should be a second-line treatment in cases of severe sleep apnea, but if not completely successful then a combination therapy utilizing CPAP plus oral appliance therapy may be effective. Oral appliances tend to be effective in a broad range of patients although studies show that the patients who have the highest success are females, younger patients, those with milder disease, and less severe obesity. 3, 6.   Finding a dentist that practices dental sleep medicine  Specific training is available through the American Academy of Dental Sleep Medicine for dentists interested in working in the field of sleep. To find a dentist who is educated in the field of sleep and the use of oral appliances, near you, visit the Web site of the American Academy of Dental Sleep Medicine.    References  1. Stephanie et al. Objectively measured vs self-reported compliance during oral appliance therapy for sleep-disordered breathing. Chest 2013; 144(5): 4886-7220.  2. Siobhan et al. Objective measurement of compliance during oral appliance therapy for sleep-disordered breathing. Thorax 2013; 68(1): 91-96.  3. Avni, et al. Mandibular advancement devices in 620 men and women with REJI and snoring: tolerability and predictors of treatment success. Chest 2004; 125: 0117-6848.  4. Collins, et al. Oral appliances for snoring and REJI: a review. Sleep 2006; 29: 244-262.  5. Karla, et al. Oral appliance treatment for REJI: an update. J Clin Sleep Med 2014; 10(2): 215-227.  6. Elpidio et al. Predictors of OSAH treatment  outcome. J Cowley Res 2007; 86: 2198-7096.      Weight Loss:    Weight loss is a long-term strategy that may improve sleep apnea in some patients.    Weight management is a personal decision and the decision should be based on your interest and the potential benefits.  If you are interested in exploring weight loss strategies, the following discussion covers the impact on weight loss on sleep apnea and the approaches that may be successful.    Being overweight does not necessarily mean you will have health consequences.  Those who have BMI over 35 or over 27 with existing medical conditions carries greater risk.   Weight loss decreases severity of sleep apnea in most people with obesity. For those with mild obesity who have developed snoring with weight gain, even 15-30 pound weight loss can improve and occasionally eliminate sleep apnea.  Structured and life-long dietary and health habits are necessary to lose weight and keep healthier weight levels.     Though there may be significant health benefits from weight loss, long-term weight loss is very difficult to achieve- studies show success with dietary management in less than 10% of people. In addition, substantial weight loss may require years of dietary control and may be difficult if patients have severe obesity. In these cases, surgical management may be considered.  Finally, older individuals who have tolerated obesity without health complications may be less likely to benefit from weight loss strategies.      [unfilled]    Surgery:    Surgery for obstructive sleep apnea is considered generally only when other therapies fail to work. Surgery may be discussed with you if you are having a difficult time tolerating CPAP and or when there is an abnormal structure that requires surgical correction.  Nose and throat surgeries often enlarge the airway to prevent collapse.  Most of these surgeries create pain for 1-2 weeks and up to half of the most common surgeries  are not effective throughout life.  You should carefully discuss the benefits and drawbacks to surgery with your sleep provider and surgeon to determine if it is the best solution for you.   More information  Surgery for REJI is directed at areas that are responsible for narrowing or complete obstruction of the airway during sleep.  There are a wide range of procedures available to enlarge and/or stabilize the airway to prevent blockage of breathing in the three major areas where it can occur: the palate, tongue, and nasal regions.  Successful surgical treatment depends on the accurate identification of the factors responsible for obstructive sleep apnea in each person.  A personalized approach is required because there is no single treatment that works well for everyone.  Because of anatomic variation, consultation with an examination by a sleep surgeon is a critical first step in determining what surgical options are best for each patient.  In some cases, examination during sedation may be recommended in order to guide the selection of procedures.  Patients will be counseled about risks and benefits as well as the typical recovery course after surgery. Surgery is typically not a cure for a person s REJI.  However, surgery will often significantly improve one s REJI severity (termed  success rate ).  Even in the absence of a cure, surgery will decrease the cardiovascular risk associated with OSA7; improve overall quality of life8 (sleepiness, functionality, sleep quality, etc).      Palate Procedures:  Patients with REJI often have narrowing of their airway in the region of their tonsils and uvula.  The goals of palate procedures are to widen the airway in this region as well as to help the tissues resist collapse.  Modern palate procedure techniques focus on tissue conservation and soft tissue rearrangement, rather than tissue removal.  Often the uvula is preserved in this procedure. Residual sleep apnea is common in  patient after pharyngoplasty with an average reduction in sleep apnea events of 33%2.      Tongue Procedures:  ExamWhile patients are awake, the muscles that surround the throat are active and keep this region open for breathing. These muscles relax during sleep, allowing the tongue and other structures to collapse and block breathing.  There are several different tongue procedures available.  Selection of a tongue base procedure depends on characteristics seen on physical exam.  Generally, procedures are aimed at removing bulky tissues in this area or preventing the back of the tongue from falling back during sleep.  Success rates for tongue surgery range from 50-62%3.    Hypoglossal Nerve Stimulation:  Hypoglossal nerve stimulation has recently received approval from the [place] Food and Drug Administration for the treatment of obstructive sleep apnea.  This is based on research showing that the system was safe and effective in treating sleep apnea6.  Results showed that the median AHI score decreased 68%, from 29.3 to 9.0. This therapy uses an implant system that senses breathing patterns and delivers mild stimulation to airway muscles, which keeps the airway open during sleep.  The system consists of three fully implanted components: a small generator (similar in size to a pacemaker), a breathing sensor, and a stimulation lead.  Using a small handheld remote, a patient turns the therapy on before bed and off upon awakening.    Candidates for this device must be greater than 22 years of age, have moderate to severe REJI (AHI between 20-65), BMI less than 32, have tried CPAP/oral appliance without success, and have appropriate upper airway anatomy (determined by a sleep endoscopy performed by Dr. Yates).    Hypoglossal Nerve Stimulation Pathway:    The sleep surgeon s office will work with the patient through the insurance prior-authorization process (including communications and appeals).    Nasal  Procedures:  Nasal obstruction can interfere with nasal breathing during the day and night.  Studies have shown that relief of nasal obstruction can improve the ability of some patients to tolerate positive airway pressure therapy for obstructive sleep apnea1.  Treatment options include medications such as nasal saline, topical corticosteroid and antihistamine sprays, and oral medications such as antihistamines or decongestants. Non-surgical treatments can include external nasal dilators for selected patients. If these are not successful by themselves, surgery can improve the nasal airway either alone or in combination with these other options.      Combination Procedures:  Combination of surgical procedures and other treatments may be recommended, particularly if patients have more than one area of narrowing or persistent positional disease.  The success rate of combination surgery ranges from 66-80%2,3.    References  1. Becca CASTRO. The Role of the Nose in Snoring and Obstructive Sleep Apnoea: An Update.  Eur Arch Otorhinolaryngol. 2011; 268: 1365-73.  2.  Mandy SM; Chandler JA; Aristeo JR; Pallanch JF; Cayla MB; Wes SG; Naseem REYNA. Surgical modifications of the upper airway for obstructive sleep apnea in adults: a systematic review and meta-analysis. SLEEP 2010;33(10):6651-9803. Paulorirosa HARRY. Hypopharyngeal surgery in obstructive sleep apnea: an evidence-based medicine review.  Arch Otolaryngol Head Neck Surg. 2006 Feb;132(2):206-13.  3. Brock YH1, Virgilio Y, Newton AAMIR. The efficacy of anatomically based multilevel surgery for obstructive sleep apnea. Otolaryngol Head Neck Surg. 2003 Oct;129(4):327-35.  4. Adrianna HARRY, Goldberg A. Hypopharyngeal Surgery in Obstructive Sleep Apnea: An Evidence-Based Medicine Review. Arch Otolaryngol Head Neck Surg. 2006 Feb;132(2):206-13.  5. Jude ANDERSON et al. Upper-Airway Stimulation for Obstructive Sleep Apnea.  N Engl J Med. 2014 Jan 9;370(2):139-49.  6. Danuta CACERES et al. Increased  Incidence of Cardiovascular Disease in Middle-aged Men with Obstructive Sleep Apnea. Am J Respir Crit Care Med; 2002 166: 159-165  7. Chanda OCHOA et al. Studying Life Effects and Effectiveness of Palatopharyngoplasty (SLEEP) study: Subjective Outcomes of Isolated Uvulopalatopharyngoplasty. Otolaryngol Head Neck Surg. 2011; 144: 623-631.              Your BMI is Body mass index is 40.23 kg/(m^2).  Weight management is a personal decision.  If you are interested in exploring weight loss strategies, the following discussion covers the approaches that may be successful. Body mass index (BMI) is one way to tell whether you are at a healthy weight, overweight, or obese. It measures your weight in relation to your height.  A BMI of 18.5 to 24.9 is in the healthy range. A person with a BMI of 25 to 29.9 is considered overweight, and someone with a BMI of 30 or greater is considered obese. More than two-thirds of American adults are considered overweight or obese.  Being overweight or obese increases the risk for further weight gain. Excess weight may lead to heart disease and diabetes.  Creating and following plans for healthy eating and physical activity may help you improve your health.  Weight control is part of healthy lifestyle and includes exercise, emotional health, and healthy eating habits. Careful eating habits lifelong are the mainstay of weight control. Though there are significant health benefits from weight loss, long-term weight loss with diet alone may be very difficult to achieve- studies show long-term success with dietary management in less than 10% of people. Attaining a healthy weight may be especially difficult to achieve in those with severe obesity. In some cases, medications, devices and surgical management might be considered.  What can you do?  If you are overweight or obese and are interested in methods for weight loss, you should discuss this with your provider.     Consider reducing daily calorie  intake by 500 calories.     Keep a food journal.     Avoiding skipping meals, consider cutting portions instead.    Diet combined with exercise helps maintain muscle while optimizing fat loss. Strength training is particularly important for building and maintaining muscle mass. Exercise helps reduce stress, increase energy, and improves fitness. Increasing exercise without diet control, however, may not burn enough calories to loose weight.       Start walking three days a week 10-20 minutes at a time    Work towards walking thirty minutes five days a week     Eventually, increase the speed of your walking for 1-2 minutes at time    In addition, we recommend that you review healthy lifestyles and methods for weight loss available through the National Institutes of Health patient information sites:  http://win.niddk.nih.gov/publications/index.htm    And look into health and wellness programs that may be available through your health insurance provider, employer, local community center, or bereket club.    Weight management plan: Patient was referred to their PCP to discuss a diet and exercise plan.

## 2018-10-16 NOTE — PROGRESS NOTES
Visit Date:   10/15/2018      DATE OF VISIT:  10/15/2018        LOCATION:  [facility].      CHIEF COMPLAINT:  I have been asked to see Mr. Dong in consultation by Dr. Keesha Miranda for fragmented sleep and some awareness of possibly having stopped breathing on and off for the last five years, in the setting of a past medical history of two episodes of pulmonary embolism.      HISTORY OF PRESENT ILLNESS:  Wife reports that snoring is not excessively loud.  However, kids do mention that he is audible, but not outside the bedroom, and the patient is unaware of frequency.  The kids do notice the snoring when they are on vacation and sleeping in more confined spaces.  He has awoken from his snoring at times.  Does have a dry throat in the morning and primarily sleeps on his stomach, feeling that he just sleeps better in that position.  Uncertain, but does think perhaps that when he does awaken with an awareness of his snoring he is on his back.  Occasional grinding of teeth.  Rare sleep paralysis.  Otherwise, no evidence of orexin deficiency.  Rare nightmare and of interest his nightmare most recently that he recalls is a cobra squeezing his chest, as he does awaken from a dream.      SLEEP SCHEDULE:  Enters bed at 10:00 p.m. on workdays, between 10:00 and 11:00 on weekends.  Exits bed at 5:30 to 6:00 on workdays, 6:45 on weekends.  Sleep latency is 20 minutes, wakes up 1-3 times a night.  Does have some regular wakeups, 3:00 a.m. is one.  On occasion , it could be as early as 1:00 a.m., and then usually may awaken as late as 5, which is close to rise time during the week and then he will often get up for the day.  He does check the clock.  Usually wakes up with nocturia and then returns to bed.  Feels he may miss about 20-25 minutes throughout the night.  Total sleep time is around 7-8 hours, feels his best if he gets 8.  Naps 1-2 times a week, for about 25 minutes.  Does check his phone  at night, just to look at the news.      SOCIAL, PERSONAL, FAMILY HISTORY, AND SLEEP SCALES:  Lives with his wife, daughter and son (who are both teens).  Sleep environment is conducive to good sleep.  Family history of both mom and dad with obstructive sleep apnea.  Mom  of a stroke, did not use her CPAP.  Father tried alternatives and I believe an MAD and is on CPAP.  A daughter is a sleepwalker, at age 15.  Alcohol:  Can have a couple of beers a night.  He is done by 8:30.  Caffeine:  Three servings, done by 10:30.  [place] Sleepiness Scale is 5/24.  Denies sleepiness affecting employment or driving abilities, but 30 days he may be tired and fatigued.  No problems with mental health.      REVIEW OF SYSTEMS:  A 14-point comprehensive review of systems is completed and negative, with the exception of the following:   CARDIOVASCULAR:  Swelling in feet or legs, status post his DVT on his right leg.   NERVOUS SYSTEM:  Occasional tingling from bunions that have not been repaired.   MUSCLES AND BONES:  He has some muscle pain, but no effects on sleep.      SURGICAL HISTORY:  ACL on right.      PAST MEDICAL HISTORY:  A PE in  and again now in 2017.      MEDICATIONS:  He is on Xarelto.   Allergies:    Allergies   Allergen Reactions     Codeine GI Disturbance       Medications:    Current Outpatient Prescriptions   Medication Sig Dispense Refill     Misc. Devices (FINGERTIP PULSE OXIMETER) MISC Use as needed to monitor pulse oxygen 1 each 1     ORDER FOR DME Equipment being ordered: Orthotics, bilateral, flat feet, one pair per year.  Dispense one pair, 1 each 0     rivaroxaban ANTICOAGULANT (XARELTO) 10 MG TABS tablet Take 1 tablet (10 mg) by mouth daily (with dinner) 90 tablet 3       Problem List:  Patient Active Problem List    Diagnosis Date Noted     Snoring 2018     Priority: Medium     History of dysplastic nevus 2017     Priority: Medium     Recurrent deep vein thrombosis (DVT) (H) 2017  "    Priority: Medium     MRSA infection 08/31/2017     Priority: Medium     Acute left ankle pain 08/17/2017     Priority: Medium     Acute deep vein thrombosis (DVT) of tibial vein of right lower extremity (H) 05/30/2017     Priority: Medium     Acute deep vein thrombosis (DVT) of popliteal vein of right lower extremity (H) 05/30/2017     Priority: Medium     Other acute pulmonary embolism without acute cor pulmonale (H) 05/30/2017     Priority: Medium     Bunion, left 02/21/2017     Priority: Medium     Bunion, right 02/21/2017     Priority: Medium     External hemorrhoids 06/30/2015     Priority: Medium     Varicosities of leg 12/18/2013     Priority: Medium     Flat feet 08/22/2012     Priority: Medium     Pulmonary embolism and infarction (H) 09/01/2001     Priority: Medium     Pt was on coumadin for 5 years  Problem list name updated by automated process. Provider to review          Past Medical/Surgical History:  Past Medical History:   Diagnosis Date     Other pulmonary embolism and infarction 9/2001    Pt was on coumadin for 5 years     Thrombosed hemorrhoids      Past Surgical History:   Procedure Laterality Date     C HAND/FINGER SURGERY UNLISTED      RT HAND INDEX FINGER NERVE DAMAGE     C NERVE GRAFT,HAND/FOOT,ONE,=<4CM  1990    Nerve Graft - Right Hand     C REPAIR CRUCIATE LIGAMENT,KNEE  1991    Right Knee     Physical Examination:  Vitals: /89  Pulse 66  Resp 16  Ht 1.679 m (5' 6.1\")  Wt 113.4 kg (250 lb)  SpO2 97%  BMI 40.23 kg/m2  BMI= Body mass index is 40.23 kg/(m^2).    Neck Cir (cm): 46 cm    [place] Total Score 10/15/2018   Total score - [place] 4       GENERAL APPEARANCE: healthy, alert and no distress  EYES: Eyes grossly normal to inspection  HENT: oropharynx crowded, uvula elongated, soft palate dependent, tongue base enlarged and NARES WITH MILD CONGESTION ON R, L nares with turbinate enlargement  NECK: thyroid normal to palpation  RESP: lungs clear to auscultation - no rales, " rhonchi or wheezes  CV: regular rates and rhythm, normal S1 S2, no S3 or S4 and no murmur, click or rub  Extremities are without clubbing,+RLE edema  Musculoskeletal: Moves without difficulty  Mallampati Class: IV.  Tonsillar Stage: 1  hidden by pillars.  Dental: Dentition in good condition. Underbite with good advancement of lower jaw without tmd  Skin: without facial rash     MERRY is 6.5 today.        ASSESSMENT AND PLAN:  It is my impression that Mr. Dong, who prefers to be called Cristo, has a low but present pretest probability for obstructive sleep apnea, with risk coming from neck size, from being male, and the fact that he has awoken himself with an awareness of apneic events.  He does feel that the risk is quite low since he does not feel particularly symptomatic.  Although does admit with his past history of pulmonary embolism that perhaps evaluating his breathing and possible sleep disordered breathing at night may be a wise idea and the following plan of care has been developed.   1.  Snoring:  STOP-Bang score of 3 in the setting of pulmonary embolus and mild obesity.  I have ordered a home study to occur.  It is likely that when he has dreams of being suffocated, which are infrequent but does have them, that these represent REM sleep supine.  I have encouraged him to start off his night sleeping supine and sleep separately, so that he is not disturbed throughout the night.  He will follow up with me following that study.   2.  Obesity:  He would benefit from weight loss and we did discuss all modalities of exercise and perhaps he would benefit from some weight training program, as his bunions do limit land aerobics.   3.  Sleep habits:  It is possible that some of his snoring and sleep fragmentation is related to alcohol intake, which is about two hours from his bedtime.  If anything, that would be a recommendation to improve sleep continuity and quality when I see him in followup.      Thank you for  allowing me to participate in this kind man's care.      Time spent with patient 60 minutes, of which greater than 50% was spent in counseling, education, and coordination of care.         EMMA REEVES, CNP             D: 10/16/2018   T: 10/16/2018   MT: JAYSON      Name:     KORY TAYLOR   MRN:      [id number]        Account:      [id number]   :      1975           Visit Date:   10/15/2018      Document: [id number]       cc: Keesha Miranda MD

## 2018-10-29 ENCOUNTER — DOCUMENTATION ONLY (OUTPATIENT)
Dept: SLEEP MEDICINE | Facility: CLINIC | Age: 43
End: 2018-10-29

## 2018-10-29 ENCOUNTER — OFFICE VISIT (OUTPATIENT)
Dept: SLEEP MEDICINE | Facility: CLINIC | Age: 43
End: 2018-10-29
Payer: COMMERCIAL

## 2018-10-29 DIAGNOSIS — R06.83 SNORING: ICD-10-CM

## 2018-10-29 PROCEDURE — G0399 HOME SLEEP TEST/TYPE 3 PORTA: HCPCS | Performed by: INTERNAL MEDICINE

## 2018-10-29 NOTE — MR AVS SNAPSHOT
After Visit Summary   10/29/2018    Cristo Dong    MRN: [id number]           Patient Information     Date Of Birth          1975        Visit Information        Provider Department      10/29/2018 2:00 PM SLEEP STUDY  [facility]        Today's Diagnoses     Snoring           Follow-ups after your visit        Your next 10 appointments already scheduled     Oct 30, 2018  9:00 AM CDT   HST Drop Off with SLEEP STUDY RM 7   [facility])    [address] [place] [id number] [phone]            Nov 12, 2018  4:15 PM CST   Telephone Visit with EMMA Vela CNP   [facility])    [address] [place] [id number] [phone]           Note: this is not an onsite visit; there is no need to come to the facility.              Who to contact     If you have questions or need follow up information about today's clinic visit or your schedule please contact [facility] directly at [phone].  Normal or non-critical lab and imaging results will be communicated to you by [employer], letter or phone within 4 business days after the clinic has received the results. If you do not hear from us within 7 days, please contact the clinic through [employer] or phone. If you have a critical or abnormal lab result, we will notify you by phone as soon as possible.  Submit refill requests through Txt4 or call your pharmacy and they will forward the refill request to us. Please allow 3 business days for your refill to be completed.          Additional Information About Your Visit        [facility] Information     Txt4 gives you secure access to your electronic health record. If you see a primary care provider, you can also send messages to your care team and make appointments. If you  have questions, please call your primary care clinic.  If you do not have a primary care provider, please call [phone] and they will assist you.        Care EveryWhere ID     This is your Care EveryWhere ID. This could be used by other organizations to access your [facility] medical records  [id number]         Blood Pressure from Last 3 Encounters:   10/15/18 138/89   09/20/18 141/86   07/20/17 128/82    Weight from Last 3 Encounters:   10/15/18 113.4 kg (250 lb)   09/20/18 112.5 kg (248 lb)   07/20/17 111.6 kg (246 lb)              We Performed the Following     HST-Home Sleep Apnea Test        Primary Care Provider Office Phone # Fax #    Keesha Miranda [phone] [address]  [facility] [address]        Equal Access to Services     WARNER GOETZ : Hadii aad ku hadasho Soomaali, waaxda luqadaha, qaybta kaalmada adeegyada, waxay alexin hayaan ademat borges . So [facility] [phone].    ATENCIÓN: Si habla español, tiene a robertson disposición servicios gratuitos de asistencia lingüística. Lela al [phone].    We comply with applicable federal civil rights laws and [place] laws. We do not discriminate on the basis of race, color, national origin, age, disability, sex, sexual orientation, or gender identity.            Thank you!     Thank you for choosing [facility]  for your care. Our goal is always to provide you with excellent care. Hearing back from our patients is one way we can continue to improve our services. Please take a few minutes to complete the written survey that you may receive in the mail after your visit with us. Thank you!             Your Updated Medication List - Protect others around you: Learn how to safely use, store and throw away your medicines at www.disposemymeds.org.          This list is accurate as of 10/29/18  3:51 PM.  Always use your most recent med list.                   Brand Name Dispense Instructions for use  Diagnosis    Fingertip Pulse Oximeter Misc     1 each    Use as needed to monitor pulse oxygen    Other acute pulmonary embolism without acute cor pulmonale (H)       order for DME     1 each    Equipment being ordered: Orthotics, bilateral, flat feet, one pair per year. Dispense one pair,    Foot pain       rivaroxaban ANTICOAGULANT 10 MG Tabs tablet    XARELTO    90 tablet    Take 1 tablet (10 mg) by mouth daily (with dinner)    Recurrent deep vein thrombosis (DVT) (H)

## 2018-10-30 ENCOUNTER — DOCUMENTATION ONLY (OUTPATIENT)
Dept: SLEEP MEDICINE | Facility: CLINIC | Age: 43
End: 2018-10-30
Payer: COMMERCIAL

## 2018-10-30 NOTE — PROGRESS NOTES
This HSAT was performed using a Noxturnal T3 device which recorded snore, sound, movement activity, body position, nasal pressure, oronasal thermal airflow, pulse, oximetry and both chest and abdominal respiratory effort. HSAT data was restricted to the time patient states they were in bed.     HSAT was scored using 1B 4% hypopnea rule.     HST AHI (Non-PAT): 8.9  Snoring was reported as intermittent.  Time with SpO2 below 89% was 15 minutes.   Overall signal quality was good     Pt will follow up with sleep provider to determine appropriate therapy.

## 2018-11-06 NOTE — PROCEDURES
"HOME SLEEP STUDY INTERPRETATION    Patient: Cristo Dong  MRN: [id number]  YOB: 1975  Study Date: 10/29/2018  Referring Provider: Keesha Miranda     Ordering Provider:Alissa Burns NP     Indications for Home Study: Cristo Dong is a 43 year old male who underwent home sleep study to evaluate for possible sleep apnea.  Estimated body mass index is 40.23 kg/(m^2) as calculated from the following:    Height as of 10/15/18: 1.679 m (5' 6.1\").    Weight as of 10/15/18: 113.4 kg (250 lb).  [place] Sleepiness Scale: 4/24  STOP-BANG: 3/8    Data: A full night home sleep study was performed recording the standard physiologic parameters including body position, movement, sound, nasal pressure, thermal oral airflow, chest and abdominal movements with respiratory inductance plethysmography, and oxygen saturation by pulse oximetry. Pulse rate was estimated by oximetry recording. This study was considered adequate, based on > 4 hours of quality oximetry and respiratory recording. As specified by the AASM Manual for the Scoring of Sleep and Associated events, version 2.3, Rule VIII.D 1B, 4% oxygen desaturation scoring for hypopneas is used as a standard of care on all home sleep apnea testing.    Analysis Time:  452.6 minutes    Respiration:   Sleep Associated Hypoxemia: sustained hypoxemia was present. Baseline oxygen saturation was 91.7 %.  Time with saturation less than or equal to 88% was 15  minutes. The lowest oxygen saturation was 83%.   Snoring: Snoring was present.  Respiratory events: The home study revealed a presence of 33 obstructive apneas and 5 mixed and central apneas. There were 29 hypopneas resulting in a combined apnea/hypopnea index [AHI] of 8.9 events per hour.  AHI was 14.3 per hour supine, 1.1 per hour prone, 38.4 per hour on left side, and 7.8 per hour on right side.   Pattern: Excluding events noted above, respiratory rate and pattern was Normal.    Position: Percent of time spent: " supine -17.6 %, prone -46.6 %, on left -10%, on right -25.4 %.    Heart Rate: By pulse oximetry normal rate was noted.     Assessment:   Mild obstructive sleep apnea.  Low baseline oxygen saturation was noted and sleep associated hypoxemia was present.    Recommendations:  Treatment options for REJI include a) auto titrating CPAP device or b) oral appliance through referral to dentistry or c) evaluation for upper airway surgical options through referral to sleep ENT provider.  Suggest optimizing sleep hygiene and avoiding sleep deprivation.  Weight management.    Diagnosis Code(s): Obstructive Sleep Apnea G47.33, Hypoxemia G47.36    James Saunders MD, November 5, 2018   Diplomate, American Board of Internal Medicine, Sleep Medicine

## 2018-11-12 ENCOUNTER — VIRTUAL VISIT (OUTPATIENT)
Dept: SLEEP MEDICINE | Facility: CLINIC | Age: 43
End: 2018-11-12
Payer: COMMERCIAL

## 2018-11-12 DIAGNOSIS — G47.33 OSA (OBSTRUCTIVE SLEEP APNEA): Primary | ICD-10-CM

## 2018-11-12 DIAGNOSIS — E66.9 OBESITY, UNSPECIFIED CLASSIFICATION, UNSPECIFIED OBESITY TYPE, UNSPECIFIED WHETHER SERIOUS COMORBIDITY PRESENT: ICD-10-CM

## 2018-11-12 DIAGNOSIS — G47.34 NOCTURNAL HYPOXEMIA: ICD-10-CM

## 2018-11-12 PROCEDURE — 98967 PH1 ASSMT&MGMT NQHP 11-20: CPT | Performed by: NURSE PRACTITIONER

## 2018-11-12 NOTE — PATIENT INSTRUCTIONS
Pt needs hst, dental referral mailed.  Please let Ana know pt will check list and call [phone] to update re: choice of dentist.    Thanks,    WM

## 2018-11-12 NOTE — MR AVS SNAPSHOT
After Visit Summary   11/12/2018    Cristo Dong    MRN: [id number]           Patient Information     Date Of Birth          1975        Visit Information        Provider Department      11/12/2018 4:15 PM Alissa Burns APRN CNP [facility]        Today's Diagnoses     REJI (obstructive sleep apnea)    -  1    Nocturnal hypoxemia        Obesity, unspecified classification, unspecified obesity type, unspecified whether serious comorbidity present          Care Instructions    Pt needs hst, dental referral mailed.  Please let Ana know pt will check list and call [phone] to update re: choice of dentist.    Thanks,    WM          Follow-ups after your visit        Additional Services     SLEEP DENTAL REFERRAL       Dental appliance resources recommended by [facility]     Below is a list of dental appliance resources recommended by [facility]   If you wish to choose your own dental sleep dentist, you may identify a provider close to you: [email or web address]    [facility]   Sleep Medicine [facility]   Pedro Armijo DDS, [address]  [place] [address]   Appointments: [phone]  Fax: [phone]   Email: [email or web address]   Dental and Oral Surgery Clinic   Ed Ch, MOMO Martinez DDS   [address]   [place] [address]   Appointments: [phone]   Website: [email or web address]    Snoring and Sleep Apnea   Dental Treatment Center   SHAWNA Muse DDS  [address]   [place] [address]   Appointments: [phone]   Fax: [phone]   Email: [email or web address]  Website: Visto     [facility]   Office 1   Murray Quiroga DDS - [[facility]]  [address]   [place] [address]  Appointments: [phone]  Fax: [phone]  Website: [employer]    [facility]   Office 2  Murray Quiroga DDS - [DME Medicare]  [address]  [place] [address]  Appointments: [phone]  Fax: [phone]  Website: [employer]    [facility]  Sergey Saini DDS  [address] [place] [address]  Appointments: [phone]    [facility]   [place] Office   Salvador Martinez DDS   [facility]   [address]   [place] [address]   Appointments: [phone]   Fax: [phone]   Website: [employer]     [facility]   [place] Office   Aba Carty DDS, MS - [DME Medicare]  [facility]   [address]   [place] [address]   Appointments: [phone]   Fax: [phone]   Website: [employer]     [facility]  Ron Rivera DMD, MSD - [DME Medicare]  [address]  [place] [address]  Appointments [phone]  Fax: [phone]  Website: [employer]    [facility]   [place] Office   Julia Momin DDS, PhD, MS   [facility]   [address]   [place] [address]   Appointments: [phone]   Fax: [phone]   Website: [employer]     [facility]   [place] Office   Julia Momin DDS, PhD, MS   [facility]   [address]   [place] [address]   Appointments: [phone]   Fax: [phone]   Website: [employer]     [facility]  Madeleine Cramer DDS  [facility]  [address] [place] [address]  Appointments: [phone]   Fax: [phone]    If you wish to choose your own dental sleep dentist, you may identify a provider close to you:  [email or web address]      AHI: 8.9/ 14.3 supine, 38.4 on left, time with 02 sat <=88% 15 minutes HST: 10/15/18.     Return to clinic in 4 for Home Sleep Test to confirm adequacy of Treatment.    Other information regarding this referral: Mandibular repositioning appliance for REJI. If your insurance asks for a CPT code, it is .    Please be aware that coverage of these services is subject to the terms and limitations of your health insurance plan.  Call member services at your health plan with any benefit or coverage questions.      Please bring the following to your appointment:    >>   List of current medications   >>   This referral request   >>   Any documents/labs given to you for this referral                  Who to contact     If you have questions or need follow up information about today's clinic visit or your schedule please contact [facility] directly at [phone].  Normal or non-critical lab and imaging results will be communicated to you by Shopinto[employer]art, letter or phone within 4 business days after the clinic has received the results. If you do not hear from us within 7 days, please contact the clinic through [employer] or phone. If you have a critical or abnormal lab result, we will notify you by phone as soon as possible.  Submit refill requests through [employer] or call your pharmacy and they will forward the refill request to us. Please allow 3 business days for your refill to be completed.          Additional Information About Your Visit        [employer] Information     [employer] gives you secure access to your electronic health record. If you see a primary care provider, you can also send messages to your care team and make appointments. If you have questions, please call your primary care clinic.  If you do not have a primary care provider, please call [phone] and they will assist you.        Care EveryWhere ID     This is your Care EveryWhere ID. This  could be used by other organizations to access your [place] medical records  [id number]         Blood Pressure from Last 3 Encounters:   10/15/18 138/89   09/20/18 141/86   07/20/17 128/82    Weight from Last 3 Encounters:   10/15/18 113.4 kg (250 lb)   09/20/18 112.5 kg (248 lb)   07/20/17 111.6 kg (246 lb)              We Performed the Following     SLEEP DENTAL REFERRAL        Primary Care Provider Office Phone # Fax #    Keesha Miranda [phone]       [address]  [facility] [address]        Equal Access to Services     WARNER GOETZ : Hadii aad ku hadasho Soomaali, waaxda luqadaha, qaybta kaalmada adeegyada, waxhaleigh bermeo haychikisn gwendolyn adams. So [facility] [phone].    ATENCIÓN: Si habla español, tiene a robertson disposición servicios gratuitos de asistencia lingüística. Llame al [phone].    We comply with applicable federal civil rights laws and [place] laws. We do not discriminate on the basis of race, color, national origin, age, disability, sex, sexual orientation, or gender identity.            Thank you!     Thank you for choosing [facility]  for your care. Our goal is always to provide you with excellent care. Hearing back from our patients is one way we can continue to improve our services. Please take a few minutes to complete the written survey that you may receive in the mail after your visit with us. Thank you!             Your Updated Medication List - Protect others around you: Learn how to safely use, store and throw away your medicines at www.disposemymeds.org.          This list is accurate as of 11/12/18  5:07 PM.  Always use your most recent med list.                   Brand Name Dispense Instructions for use Diagnosis    Fingertip Pulse Oximeter Misc     1 each    Use as needed to monitor pulse oxygen    Other acute pulmonary embolism without acute cor pulmonale (H)       order for DME     1 each    Equipment being ordered:  Orthotics, bilateral, flat feet, one pair per year. Dispense one pair,    Foot pain       rivaroxaban ANTICOAGULANT 10 MG Tabs tablet    XARELTO    90 tablet    Take 1 tablet (10 mg) by mouth daily (with dinner)    Recurrent deep vein thrombosis (DVT) (H)

## 2018-11-12 NOTE — PROGRESS NOTES
CC: Review results of HST and develop a plan of care    HPI: fragmented sleep and some awareness of possibly having stopped breathing on and off for the last five years, in the setting of a past medical history of two episodes of pulmonary embolism.   Influence on snoring can be related to alcohol within 3-4 hrs of bedtime and being overweight (BMI 35).  Pt also grinds teeth as well.  Treatment options along with risks and benefits were discussed which include CPAP, mandibular advancement device for both snoring, hypoxemia, and grinding, and possible surgery. Patient travels frequently for work.  Prefers to consider use of dental appliance with follow-up of home sleep study or overnight oximetry after a visit to review resolution of symptoms.    Recommendations:either dental or cpap with f/u on nocturnal hypoxemia and resolution of symptoms      A/P: Patient has chosen to consider a dental appliance.  Referral for full list will be provided.    1. Yoel: mild.  Dental referral, full list provided.  Will have sleep study mailed out, and patient should have a follow-up in 4 months time after dental appliance has been stripped constructed.  2. noturnal hypoxemia: We will reevaluate that upon return to clinic.  3.  Obesity: BMI 35, patient is encouraged to lose weight.    Time spent with patient is 15 minutes spent in a scheduled phone visit, of which >50% was spent in counseling, education and coordination of care.        This was a scheduled 15 minute phone call

## 2018-11-14 ENCOUNTER — DOCUMENTATION ONLY (OUTPATIENT)
Dept: SLEEP MEDICINE | Facility: CLINIC | Age: 43
End: 2018-11-14

## 2018-11-14 NOTE — PROGRESS NOTES
Dental referral sent on Wednesday, November 14 to [place] [facility] ([address]) to have Dental  to review and start process of referral then contact pt with scheduling.  Ana Melton,

## 2018-11-15 ENCOUNTER — TELEPHONE (OUTPATIENT)
Dept: SLEEP MEDICINE | Facility: CLINIC | Age: 43
End: 2018-11-15

## 2018-11-15 NOTE — TELEPHONE ENCOUNTER
After review of chart, dental referral was sent. Mailed out HST to patient's home.    Radha Corley [facility] ~[place]

## 2018-11-15 NOTE — TELEPHONE ENCOUNTER
----- Message from EMMA Vela CNP sent at 11/12/2018  5:07 PM CST -----  Dental referral and mail out HST copy

## 2018-11-16 ENCOUNTER — TELEPHONE (OUTPATIENT)
Dept: DENTISTRY | Facility: CLINIC | Age: 43
End: 2018-11-16

## 2018-11-16 NOTE — TELEPHONE ENCOUNTER
Called to offer NP appt for pt to see Dr. Armijo for REJI. Left message with my contact phone number and ext.

## 2019-06-18 DIAGNOSIS — Z51.81 MEDICATION MONITORING ENCOUNTER: ICD-10-CM

## 2019-06-18 LAB — PLATELET # BLD AUTO: 232 10E9/L (ref 150–450)

## 2019-06-18 PROCEDURE — 82565 ASSAY OF CREATININE: CPT | Performed by: FAMILY MEDICINE

## 2019-06-18 PROCEDURE — 36415 COLL VENOUS BLD VENIPUNCTURE: CPT | Performed by: FAMILY MEDICINE

## 2019-06-18 PROCEDURE — 85049 AUTOMATED PLATELET COUNT: CPT | Performed by: FAMILY MEDICINE

## 2019-06-19 LAB
CREAT SERPL-MCNC: 0.99 MG/DL (ref 0.66–1.25)
GFR SERPL CREATININE-BSD FRML MDRD: >90 ML/MIN/{1.73_M2}

## 2019-08-14 ENCOUNTER — OFFICE VISIT (OUTPATIENT)
Dept: FAMILY MEDICINE | Facility: CLINIC | Age: 44
End: 2019-08-14
Payer: COMMERCIAL

## 2019-08-14 VITALS
WEIGHT: 240 LBS | SYSTOLIC BLOOD PRESSURE: 134 MMHG | BODY MASS INDEX: 34.36 KG/M2 | RESPIRATION RATE: 18 BRPM | HEIGHT: 70 IN | HEART RATE: 72 BPM | DIASTOLIC BLOOD PRESSURE: 78 MMHG | TEMPERATURE: 98.5 F | OXYGEN SATURATION: 98 %

## 2019-08-14 DIAGNOSIS — R03.0 ELEVATED BP WITHOUT DIAGNOSIS OF HYPERTENSION: Primary | ICD-10-CM

## 2019-08-14 PROCEDURE — 99213 OFFICE O/P EST LOW 20 MIN: CPT | Performed by: NURSE PRACTITIONER

## 2019-08-14 ASSESSMENT — MIFFLIN-ST. JEOR: SCORE: 1971.94

## 2019-08-14 NOTE — PROGRESS NOTES
"Subjective     Cristo Dong is a 44 year old male who presents to clinic today for the following health issues:    HPI     Hypertension Discussion    July 23rd pt had a bruise on his shin. Ankles swelled up. Pt was concerned about a clot. Clotting doc said it is unlikely to be a clot/concern.    Pt has noticed his eye site has been blurry, \"I don't feel exactly right\", also pressure in head.   Eye exam is scheduled tomorrow     Pulse ox at home: pulse and 02 has been normal        Do you check your blood pressure regularly outside of the clinic? Yes, at the grocery store. business trip in [facility]    Are you following a low salt diet? No    Are your blood pressures ever more than 140 on the top number (systolic) OR more   than 90 on the bottom number (diastolic), for example 140/90? Yes 147/101 and 146/99 at the grocery store.       How many servings of fruits and vegetables do you eat daily?  2-3. Intermittent fasting every other day for 8-10 hours usually from 8 am-4 PM    On average, how many sweetened beverages do you drink each day (soda, juice, sweet tea, etc)?  2 cups of coffee per day    How many days per week do you miss taking your medication? 0. Last few days pt has taken 20 MG of Xarelto. Will go back to 10 MG tonight.   Exercise: Swimming frequently     He is here to check his blood pressure, since it was elevated at the grocery store.  He has been trying to lose weight.  He is exercising regularly.  He denies any chest pain, shortness of breath, diplopia, dizziness, calf pain.                 Reviewed and updated as needed this visit by Provider         Review of Systems   ROS COMP: CONSTITUTIONAL: NEGATIVE for fever, chills, change in weight  ENT/MOUTH: NEGATIVE for ear, mouth and throat problems  RESP: NEGATIVE for significant cough or SOB  CV: NEGATIVE for chest pain, palpitations or peripheral edema  GI: NEGATIVE for nausea, abdominal pain, heartburn, or change in bowel habits  MUSCULOSKELETAL: " "NEGATIVE for significant arthralgias or myalgia  NEURO: NEGATIVE for weakness, dizziness or paresthesias  PSYCHIATRIC: NEGATIVE for changes in mood or affect      Objective    /78   Pulse 72   Temp 98.5  F (36.9  C) (Oral)   Resp 18   Ht 1.765 m (5' 9.5\")   Wt 108.9 kg (240 lb)   SpO2 98%   BMI 34.93 kg/m    Body mass index is 34.93 kg/m .  Physical Exam   GENERAL: healthy, alert and no distress  PSYCH: mentation appears normal, affect normal/bright            Assessment & Plan     1. Elevated BP without diagnosis of hypertension  Discussed low sodium diet, continuing to work on weight loss, regular exercise.  He has a follow up appointment with his PCP scheduled for 9/5.       BMI:   Estimated body mass index is 34.93 kg/m  as calculated from the following:    Height as of this encounter: 1.765 m (5' 9.5\").    Weight as of this encounter: 108.9 kg (240 lb).   Weight management plan: Discussed healthy diet and exercise guidelines            No follow-ups on file.    Thuy Garrett NP  [facility]        "

## 2019-09-17 ENCOUNTER — MYC REFILL (OUTPATIENT)
Dept: FAMILY MEDICINE | Facility: CLINIC | Age: 44
End: 2019-09-17

## 2019-09-17 DIAGNOSIS — I82.409 RECURRENT DEEP VEIN THROMBOSIS (DVT) (H): ICD-10-CM

## 2019-09-17 NOTE — TELEPHONE ENCOUNTER
Requested Prescriptions   Pending Prescriptions Disp Refills     rivaroxaban ANTICOAGULANT (XARELTO) 10 MG TABS tablet 90 tablet 3     Sig: Take 1 tablet (10 mg) by mouth daily (with dinner)  Last Written Prescription Date:  3/21/2019  Last Fill Quantity: 90 tablet,  # refills: 3   Last Office Visit: 8/14/2019   Future Office Visit:            Direct Oral Anticoagulant Agents Failed - 9/17/2019  8:18 AM        Failed - Creatinine Clearance greater than 50 ml/min on file in past 3 mos     No lab results found.          Failed - Serum creatinine less than or equal to 1.4 on file in past 3 mos     Recent Labs   Lab Test 06/18/19  1604   CR 0.99           Passed - Normal Platelets on file in past 12 months     Recent Labs   Lab Test 06/18/19  1604              Passed - Medication is active on med list        Passed - Patient is 18 years of age or older        Passed - Recent (6 mo) or future (30 days) visit within the authorizing provider's specialty

## 2019-09-19 NOTE — TELEPHONE ENCOUNTER
Message sent to pharmacy - Refusal reason: Duplicate (SEE ORDER SENT 3/21/19 FOR ONE YEAR SUPPLY.).  George DAVILA

## 2019-09-22 ENCOUNTER — MYC REFILL (OUTPATIENT)
Dept: FAMILY MEDICINE | Facility: CLINIC | Age: 44
End: 2019-09-22

## 2019-09-22 ENCOUNTER — MYC MEDICAL ADVICE (OUTPATIENT)
Dept: FAMILY MEDICINE | Facility: CLINIC | Age: 44
End: 2019-09-22

## 2019-09-22 DIAGNOSIS — I82.409 RECURRENT DEEP VEIN THROMBOSIS (DVT) (H): ICD-10-CM

## 2019-09-23 NOTE — TELEPHONE ENCOUNTER
Requested Prescriptions   Pending Prescriptions Disp Refills     rivaroxaban ANTICOAGULANT (XARELTO) 10 MG TABS tablet 90 tablet 3     Sig: Take 1 tablet (10 mg) by mouth daily (with dinner)       Direct Oral Anticoagulant Agents Failed - 9/22/2019  3:03 PM        Failed - Creatinine Clearance greater than 50 ml/min on file in past 3 mos     No lab results found.          Failed - Serum creatinine less than or equal to 1.4 on file in past 3 mos     Recent Labs   Lab Test 06/18/19  1604   CR 0.99             Passed - Normal Platelets on file in past 12 months     Recent Labs   Lab Test 06/18/19  1604                  Passed - Medication is active on med list        Passed - Patient is 18 years of age or older        Passed - Recent (6 mo) or future (30 days) visit within the authorizing provider's specialty        Duplicate - sent [employer]

## 2019-11-06 ENCOUNTER — HEALTH MAINTENANCE LETTER (OUTPATIENT)
Age: 44
End: 2019-11-06

## 2020-02-25 ENCOUNTER — MYC REFILL (OUTPATIENT)
Dept: FAMILY MEDICINE | Facility: CLINIC | Age: 45
End: 2020-02-25

## 2020-02-25 DIAGNOSIS — I82.441 ACUTE DEEP VEIN THROMBOSIS (DVT) OF TIBIAL VEIN OF RIGHT LOWER EXTREMITY (H): ICD-10-CM

## 2020-02-25 DIAGNOSIS — Z51.81 MEDICATION MONITORING ENCOUNTER: Primary | ICD-10-CM

## 2020-02-25 DIAGNOSIS — I82.409 RECURRENT DEEP VEIN THROMBOSIS (DVT) (H): ICD-10-CM

## 2020-02-26 NOTE — TELEPHONE ENCOUNTER
Requested Prescriptions   Pending Prescriptions Disp Refills     rivaroxaban ANTICOAGULANT (XARELTO) 10 MG TABS tablet 90 tablet 3     Sig: Take 1 tablet (10 mg) by mouth daily (with dinner)  Last Written Prescription Date:  3/21/2019  Last Fill Quantity: 90 tablet,  # refills: 3   Last Office Visit: 8/14/2019   Future Office Visit:            Direct Oral Anticoagulant Agents Failed - 2/25/2020  4:12 PM        Failed - Creatinine Clearance greater than 50 ml/min on file in past 3 mos     No lab results found.          Failed - Serum creatinine less than or equal to 1.4 on file in past 3 mos     Recent Labs   Lab Test 06/18/19  1604   CR 0.99           Failed - Recent (6 mo) or future (30 days) visit within the authorizing provider's specialty        Passed - Normal Platelets on file in past 12 months     Recent Labs   Lab Test 06/18/19  1604              Passed - Medication is active on med list        Passed - Patient is 18 years of age or older

## 2020-02-28 NOTE — TELEPHONE ENCOUNTER
My chart note sent to patient in separate encounter.  Patient is due for PLT and CR.  Did you want just those 2 labs tested or a CBC and BMP?  Hyacinth Armendariz RN

## 2020-03-13 ENCOUNTER — MYC MEDICAL ADVICE (OUTPATIENT)
Dept: FAMILY MEDICINE | Facility: CLINIC | Age: 45
End: 2020-03-13

## 2020-03-13 NOTE — TELEPHONE ENCOUNTER
See other My Chart from 3/12/2020.  Patient was sent response thanking him for the update and telling him he does not need to do an [facility] unless symptoms return.  Hyacinth Armendariz RN

## 2020-03-15 NOTE — PROGRESS NOTES
"Date: 2020 14:16:23  Clinician: Luz Terrell  Clinician NPI: [id number]  Patient: Cristo Dong  Patient : 1975  Patient Address: [address] [place], [place] [address]  Patient Phone: [phone]  Visit Protocol: URI  Patient Summary:  Cristo is a 45 year old ( : 1975 ) male who initiated a Visit for COVID-19 (Coronavirus) evaluation and screening. When asked the question \"Please sign me up to receive news, health information and promotions. \", Cristo responded \"No\".    Cristo states his symptoms started gradually 3-6 days ago. After his symptoms started, they improved and then got worse again.   His symptoms consist of a sore throat and nasal congestion.   Symptom details     Nasal secretions: The color of his mucus is clear.    Sore throat: Cristo reports having mild throat pain (1-3 on a 10 point pain scale), does not have exudate on his tonsils, and can swallow liquids. He is not sure if the lymph nodes in his neck are enlarged. A rash has not appeared on the skin since the sore throat started.      Cristo denies having teeth pain, ear pain, malaise, headache, rhinitis, facial pain or pressure, myalgias, chills, wheezing, cough, and fever. He also denies having recent facial or sinus surgery in the past 60 days and taking antibiotic medication for the symptoms. He is not experiencing dyspnea.   Precipitating events  Cristo is not sure if he has been exposed to someone with strep throat.   Pertinent COVID-19 (Coronavirus) information  Cristo has traveled internationally or to the areas where COVID-19 (Coronavirus) is widespread in the last 14 days before the start of his symptoms. Countries or locations traveled as reported by the patient (free text): [place] for business  and was in a meeting with someone who had been in [place]  but he said not [place]. 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   Cristo has not had close contact with a suspected or laboratory-confirmed COVID-19 patient within 14 days of symptom onset.   Cristo is not a healthcare worker and does not work in a healthcare facility.   Pertinent medical history  Cristo does not need a return to work/school note.   Weight: 245 lbs   Cristo does not smoke or use smokeless tobacco.   Additional information as reported by the patient (free text): I developed a sore throat PM of , then it returned 11AM  so I went home and checked my temp and it was 99.0. I stayed home bc I had flown to [place]  and was in a meeting with someone who was in [place] (but not [place] they said)  or so. I was in [place] .  My sore throat persisted mildly -current and ebbs and flows. My temp ranges from 98.1-99.3 and oxygen from 94-99. I have had two PEs but am healthy from clotting but swim now and lungs feel fine.   Weight: 245 lbs    MEDICATIONS: Xarelto oral, ALLERGIES: codeine  Clinician Response:  Dear Kamron Lemons Strep Test    I am sorry you are not feeling well. Your strep test has . Based on the information provided, it is possible that you could have strep throat. When left untreated, strep can spread to other areas of the body and cause a more serious infection.  A strep test is the only way to determine if a bacterial infection or a virus is causing your sore throat. This is done in a lab where a swab of the back of your throat is collected tested for the bacteria that causes strep.  Since you chose not to use the lab order, please be seen:     In a clinic or urgent care    Within 24 hours     Call 911 or go to the emergency room if you suddenly develop a rash, are drooling or unable to swallow fluids, if you are having difficulty breathing, or feel that your throat is closing off.   Diagnosis: [facility] Strep  Diagnosis ICD: J02.0  Additional Clinician Notes: I do not feel at this point  without fever or cough, testing for COVID-19 is necessary. I do think testing for strep would be warranted.  If that is positive, we would send through antibiotics.  If fever or cough does develop, please submit a second visit for consideration of COVID testing.  [facility] Results: [place] Strep Test -   [facility] Secondary Results: null

## 2020-05-11 ENCOUNTER — E-VISIT (OUTPATIENT)
Dept: FAMILY MEDICINE | Facility: CLINIC | Age: 45
End: 2020-05-11
Payer: COMMERCIAL

## 2020-05-11 ENCOUNTER — MYC MEDICAL ADVICE (OUTPATIENT)
Dept: FAMILY MEDICINE | Facility: CLINIC | Age: 45
End: 2020-05-11

## 2020-05-11 DIAGNOSIS — Z51.81 MEDICATION MONITORING ENCOUNTER: Primary | ICD-10-CM

## 2020-05-11 PROCEDURE — 99207 ZZC NON-BILLABLE SERV PER CHARTING: CPT | Performed by: FAMILY MEDICINE

## 2020-05-13 ENCOUNTER — TELEPHONE (OUTPATIENT)
Dept: FAMILY MEDICINE | Facility: CLINIC | Age: 45
End: 2020-05-13

## 2020-05-13 NOTE — TELEPHONE ENCOUNTER
Reason for Call:  Other call back    Detailed comments: patient wants a nurse to call him back.    Did a My Chart message online and was TOLD to schedule a visit.    Patient does NOT want to schedule a visit, but he WANTS his labs.    I told him [place]'s policy we need to schedule, but he refuses.      Phone Number Patient can be reached at: Cell number on file:    Telephone Information:   Mobile [phone]       Best Time: asap    Can we leave a detailed message on this number? YES    Call taken on 5/13/2020 at 10:00 AM by Marilee Castillo

## 2020-05-15 ENCOUNTER — MYC MEDICAL ADVICE (OUTPATIENT)
Dept: FAMILY MEDICINE | Facility: CLINIC | Age: 45
End: 2020-05-15

## 2020-05-15 DIAGNOSIS — Z51.81 MEDICATION MONITORING ENCOUNTER: Primary | ICD-10-CM

## 2020-05-15 DIAGNOSIS — Z13.6 CARDIOVASCULAR SCREENING; LDL GOAL LESS THAN 130: ICD-10-CM

## 2020-05-15 DIAGNOSIS — R03.0 ELEVATED BP WITHOUT DIAGNOSIS OF HYPERTENSION: ICD-10-CM

## 2020-05-15 DIAGNOSIS — I82.409 RECURRENT DEEP VEIN THROMBOSIS (DVT) (H): ICD-10-CM

## 2020-05-15 DIAGNOSIS — Z11.59 SCREENING FOR VIRAL DISEASE: ICD-10-CM

## 2020-05-15 NOTE — TELEPHONE ENCOUNTER
LM on VM to call clinic back to schedule telephone, video or e-visit for the lab requested.    Jose Duval MA

## 2020-05-18 DIAGNOSIS — Z13.6 CARDIOVASCULAR SCREENING; LDL GOAL LESS THAN 130: Primary | ICD-10-CM

## 2020-05-18 DIAGNOSIS — I82.409 RECURRENT DEEP VEIN THROMBOSIS (DVT) (H): ICD-10-CM

## 2020-05-18 DIAGNOSIS — R03.0 ELEVATED BP WITHOUT DIAGNOSIS OF HYPERTENSION: ICD-10-CM

## 2020-05-18 DIAGNOSIS — Z51.81 ENCOUNTER FOR THERAPEUTIC DRUG MONITORING: ICD-10-CM

## 2020-05-19 DIAGNOSIS — Z11.59 SCREENING FOR VIRAL DISEASE: ICD-10-CM

## 2020-05-19 DIAGNOSIS — I82.441 ACUTE DEEP VEIN THROMBOSIS (DVT) OF TIBIAL VEIN OF RIGHT LOWER EXTREMITY (H): ICD-10-CM

## 2020-05-19 DIAGNOSIS — Z51.81 MEDICATION MONITORING ENCOUNTER: ICD-10-CM

## 2020-05-19 DIAGNOSIS — I82.409 RECURRENT DEEP VEIN THROMBOSIS (DVT) (H): ICD-10-CM

## 2020-05-19 DIAGNOSIS — Z13.6 CARDIOVASCULAR SCREENING; LDL GOAL LESS THAN 130: ICD-10-CM

## 2020-05-19 DIAGNOSIS — Z51.81 ENCOUNTER FOR THERAPEUTIC DRUG MONITORING: ICD-10-CM

## 2020-05-19 DIAGNOSIS — R03.0 ELEVATED BP WITHOUT DIAGNOSIS OF HYPERTENSION: ICD-10-CM

## 2020-05-19 LAB
ALBUMIN SERPL-MCNC: 3.7 G/DL (ref 3.4–5)
ALP SERPL-CCNC: 49 U/L (ref 40–150)
ALT SERPL W P-5'-P-CCNC: 28 U/L (ref 0–70)
ANION GAP SERPL CALCULATED.3IONS-SCNC: 7 MMOL/L (ref 3–14)
AST SERPL W P-5'-P-CCNC: 17 U/L (ref 0–45)
BASOPHILS # BLD AUTO: 0.1 10E9/L (ref 0–0.2)
BASOPHILS NFR BLD AUTO: 1.1 %
BILIRUB SERPL-MCNC: 1 MG/DL (ref 0.2–1.3)
BUN SERPL-MCNC: 12 MG/DL (ref 7–30)
CALCIUM SERPL-MCNC: 8.8 MG/DL (ref 8.5–10.1)
CHLORIDE SERPL-SCNC: 107 MMOL/L (ref 94–109)
CHOLEST SERPL-MCNC: 161 MG/DL
CO2 SERPL-SCNC: 24 MMOL/L (ref 20–32)
CREAT SERPL-MCNC: 0.73 MG/DL (ref 0.66–1.25)
CREAT UR-MCNC: 152 MG/DL
DIFFERENTIAL METHOD BLD: ABNORMAL
EOSINOPHIL # BLD AUTO: 0.2 10E9/L (ref 0–0.7)
EOSINOPHIL NFR BLD AUTO: 5.1 %
ERYTHROCYTE [DISTWIDTH] IN BLOOD BY AUTOMATED COUNT: 12.8 % (ref 10–15)
GFR SERPL CREATININE-BSD FRML MDRD: >90 ML/MIN/{1.73_M2}
GLUCOSE SERPL-MCNC: 96 MG/DL (ref 70–99)
HCT VFR BLD AUTO: 41.3 % (ref 40–53)
HDLC SERPL-MCNC: 68 MG/DL
HGB BLD-MCNC: 13.2 G/DL (ref 13.3–17.7)
LDLC SERPL CALC-MCNC: 83 MG/DL
LYMPHOCYTES # BLD AUTO: 2 10E9/L (ref 0.8–5.3)
LYMPHOCYTES NFR BLD AUTO: 42.7 %
MCH RBC QN AUTO: 27.8 PG (ref 26.5–33)
MCHC RBC AUTO-ENTMCNC: 32 G/DL (ref 31.5–36.5)
MCV RBC AUTO: 87 FL (ref 78–100)
MICROALBUMIN UR-MCNC: 5 MG/L
MICROALBUMIN/CREAT UR: 3.38 MG/G CR (ref 0–17)
MONOCYTES # BLD AUTO: 0.4 10E9/L (ref 0–1.3)
MONOCYTES NFR BLD AUTO: 8.3 %
NEUTROPHILS # BLD AUTO: 2 10E9/L (ref 1.6–8.3)
NEUTROPHILS NFR BLD AUTO: 42.8 %
NONHDLC SERPL-MCNC: 93 MG/DL
PLATELET # BLD AUTO: 244 10E9/L (ref 150–450)
POTASSIUM SERPL-SCNC: 3.8 MMOL/L (ref 3.4–5.3)
PROT SERPL-MCNC: 7.2 G/DL (ref 6.8–8.8)
RBC # BLD AUTO: 4.75 10E12/L (ref 4.4–5.9)
SODIUM SERPL-SCNC: 138 MMOL/L (ref 133–144)
TRIGL SERPL-MCNC: 52 MG/DL
WBC # BLD AUTO: 4.7 10E9/L (ref 4–11)

## 2020-05-19 PROCEDURE — 80053 COMPREHEN METABOLIC PANEL: CPT | Performed by: FAMILY MEDICINE

## 2020-05-19 PROCEDURE — 80061 LIPID PANEL: CPT | Performed by: FAMILY MEDICINE

## 2020-05-19 PROCEDURE — 36415 COLL VENOUS BLD VENIPUNCTURE: CPT | Performed by: FAMILY MEDICINE

## 2020-05-19 PROCEDURE — 82043 UR ALBUMIN QUANTITATIVE: CPT | Performed by: FAMILY MEDICINE

## 2020-05-19 PROCEDURE — 85025 COMPLETE CBC W/AUTO DIFF WBC: CPT | Performed by: FAMILY MEDICINE

## 2020-05-19 NOTE — RESULT ENCOUNTER NOTE
Hello!  Thank you for getting labs done. Your cbc, or complete blood count, which measures red blood cells (to check for anemia and other vitamin deficiencies) and white blood cells (to check for infection and leukemia) is normal, which is great!  You do not have anemia or an infection.    Your platelets, which reflect liver function and ability to clot, are normal as well.     If you have any questions, please contact the clinic or schedule an appointment with me, thank you!    Sincerely,    MICHELL PEREZ MD   5/19/2020

## 2020-05-19 NOTE — RESULT ENCOUNTER NOTE
Wow!!! Your cholesterol is amazing! Everything has gone down and is at super goal! Your total cholesterol is less than 160, your triglycerides are phenomenally low, your HDL is very high (the higher the better for HDL) and your LDL is less than 100. GREAT JOB!!! Whatever you're doing is working!    Everything else looks good as well.    The testing of your blood sugar, kidney function, liver function and electrolytes was normal.     Your microalbumen is normal, which is great news. This means you do not have protein in the urine, and that your kidneys are currently functioning well.    Strong work!    Sincerely,  Dr. Keesha Miranda MD  5/19/2020

## 2020-05-22 ENCOUNTER — MYC MEDICAL ADVICE (OUTPATIENT)
Dept: FAMILY MEDICINE | Facility: CLINIC | Age: 45
End: 2020-05-22

## 2020-05-22 LAB
COVID-19 SPIKE RBD ABY TITER: NORMAL
COVID-19 SPIKE RBD ABY: NORMAL

## 2020-05-27 ENCOUNTER — MYC REFILL (OUTPATIENT)
Dept: FAMILY MEDICINE | Facility: CLINIC | Age: 45
End: 2020-05-27

## 2020-05-27 DIAGNOSIS — I82.409 RECURRENT DEEP VEIN THROMBOSIS (DVT) (H): ICD-10-CM

## 2020-11-12 ENCOUNTER — MYC MEDICAL ADVICE (OUTPATIENT)
Dept: FAMILY MEDICINE | Facility: CLINIC | Age: 45
End: 2020-11-12

## 2020-11-19 ENCOUNTER — OFFICE VISIT (OUTPATIENT)
Dept: FAMILY MEDICINE | Facility: CLINIC | Age: 45
End: 2020-11-19
Payer: COMMERCIAL

## 2020-11-19 DIAGNOSIS — Z12.11 SCREEN FOR COLON CANCER: Primary | ICD-10-CM

## 2020-11-19 PROCEDURE — 99213 OFFICE O/P EST LOW 20 MIN: CPT | Mod: TEL | Performed by: FAMILY MEDICINE

## 2020-11-19 NOTE — PROGRESS NOTES
"Cristo Dong is a 45 year old male who is being evaluated via a billable telephone visit.      The patient has been notified of following:     \"This telephone visit will be conducted via a call between you and your physician/provider. We have found that certain health care needs can be provided without the need for a physical exam.  This service lets us provide the care you need with a short phone conversation.  If a prescription is necessary we can send it directly to your pharmacy.  If lab work is needed we can place an order for that and you can then stop by our lab to have the test done at a later time.    Telephone visits are billed at different rates depending on your insurance coverage. During this emergency period, for some insurers they may be billed the same as an in-person visit.  Please reach out to your insurance provider with any questions.    If during the course of the call the physician/provider feels a telephone visit is not appropriate, you will not be charged for this service.\"    Patient has given verbal consent for Telephone visit?  Yes    What phone number would you like to be contacted at? [phone]    How would you like to obtain your AVS? MyChart    Subjective     Cristo Dong is a 45 year old male who presents via phone visit today for the following health issues:    HPI     Rectal lump  A few weeks ago he noticed a small rectal lump, nontender. Seems to have resolved, may have been caused by slide board use.    He's lost 30 lbs recently.       Review of Systems   Constitutional, HEENT, cardiovascular, pulmonary, gi and gu systems are negative, except as otherwise noted.       Objective          Vitals:  No vitals were obtained today due to virtual visit.    healthy, alert and no distress  PSYCH: Alert and oriented times 3; coherent speech, normal   rate and volume, able to articulate logical thoughts, able   to abstract reason, no tangential thoughts, no hallucinations   or delusions "  His affect is normal  RESP: No cough, no audible wheezing, able to talk in full sentences  Remainder of exam unable to be completed due to telephone visits        Assessment & Plan     Screen for colon cancer  Reports anal lump, now resolved, possibly hemorrhoid or acute swelling.  There is a newer recommendation to start colon cancer screening at age 45, so he may certainly proceed with this. See referral.  - GASTROENTEROLOGY ADULT REF PROCEDURE ONLY; Future        Return in about 6 months (around 5/19/2021) for preventive visit (wellness/annual physical exam).    Keesha Miranda MD  [facility]    Phone call duration:  10 minutes

## 2020-11-20 ENCOUNTER — TELEPHONE (OUTPATIENT)
Dept: GASTROENTEROLOGY | Facility: OUTPATIENT CENTER | Age: 45
End: 2020-11-20

## 2020-11-20 NOTE — TELEPHONE ENCOUNTER
Patient is scheduled for COLONOSCOPY with Dr. RICHARDSON    Spoke with: PATIENT    Date of Procedure: 1/22/2021     Location: [facility] [place]    Sedation Type CS    Pre-op for Unit J MAC NA    Informed patient they will need an adult  Y    Informed Patient of COVID Test Requirement Y/SCHED    Preferred Pharmacy for Pre Prescription     Confirmed Nurse will call to complete assessment Y    Additional comments: N/A

## 2020-11-29 ENCOUNTER — HEALTH MAINTENANCE LETTER (OUTPATIENT)
Age: 45
End: 2020-11-29

## 2020-11-29 DIAGNOSIS — Z11.59 ENCOUNTER FOR SCREENING FOR OTHER VIRAL DISEASES: Primary | ICD-10-CM

## 2021-01-22 ENCOUNTER — HOSPITAL ENCOUNTER (OUTPATIENT)
Facility: AMBULATORY SURGERY CENTER | Age: 46
Discharge: HOME | End: 2021-01-22
Attending: INTERNAL MEDICINE | Admitting: INTERNAL MEDICINE
Payer: COMMERCIAL

## 2021-01-22 VITALS
TEMPERATURE: 97.4 F | DIASTOLIC BLOOD PRESSURE: 86 MMHG | WEIGHT: 220 LBS | HEIGHT: 71 IN | OXYGEN SATURATION: 100 % | HEART RATE: 58 BPM | SYSTOLIC BLOOD PRESSURE: 134 MMHG | BODY MASS INDEX: 30.8 KG/M2 | RESPIRATION RATE: 16 BRPM

## 2021-01-22 LAB — COLONOSCOPY: NORMAL

## 2021-01-22 PROCEDURE — 45385 COLONOSCOPY W/LESION REMOVAL: CPT | Mod: 33 | Performed by: INTERNAL MEDICINE

## 2021-01-22 PROCEDURE — 45385 COLONOSCOPY W/LESION REMOVAL: CPT | Mod: 33

## 2021-01-22 PROCEDURE — 88305 TISSUE EXAM BY PATHOLOGIST: CPT | Performed by: PATHOLOGY

## 2021-01-22 RX ORDER — ONDANSETRON 2 MG/ML
4 INJECTION INTRAMUSCULAR; INTRAVENOUS
Status: DISCONTINUED | OUTPATIENT
Start: 2021-01-22 | End: 2021-01-23 | Stop reason: HOSPADM

## 2021-01-22 RX ORDER — LIDOCAINE 40 MG/G
CREAM TOPICAL
Status: DISCONTINUED | OUTPATIENT
Start: 2021-01-22 | End: 2021-01-23 | Stop reason: HOSPADM

## 2021-01-22 RX ORDER — SODIUM CHLORIDE, SODIUM LACTATE, POTASSIUM CHLORIDE, AND CALCIUM CHLORIDE .6; .31; .03; .02 G/100ML; G/100ML; G/100ML; G/100ML
IRRIGANT IRRIGATION PRN
Status: DISCONTINUED | OUTPATIENT
Start: 2021-01-22 | End: 2021-01-22 | Stop reason: HOSPADM

## 2021-01-22 RX ORDER — ONDANSETRON 4 MG/1
4 TABLET, ORALLY DISINTEGRATING ORAL EVERY 6 HOURS PRN
Status: CANCELLED | OUTPATIENT
Start: 2021-01-22

## 2021-01-22 RX ORDER — PROCHLORPERAZINE MALEATE 10 MG
10 TABLET ORAL EVERY 6 HOURS PRN
Status: CANCELLED | OUTPATIENT
Start: 2021-01-22

## 2021-01-22 RX ORDER — NALOXONE HYDROCHLORIDE 0.4 MG/ML
0.2 INJECTION, SOLUTION INTRAMUSCULAR; INTRAVENOUS; SUBCUTANEOUS
Status: CANCELLED | OUTPATIENT
Start: 2021-01-22 | End: 2021-01-23

## 2021-01-22 RX ORDER — SIMETHICONE
LIQUID (ML) MISCELLANEOUS PRN
Status: DISCONTINUED | OUTPATIENT
Start: 2021-01-22 | End: 2021-01-22 | Stop reason: HOSPADM

## 2021-01-22 RX ORDER — ONDANSETRON 2 MG/ML
4 INJECTION INTRAMUSCULAR; INTRAVENOUS EVERY 6 HOURS PRN
Status: CANCELLED | OUTPATIENT
Start: 2021-01-22

## 2021-01-22 RX ORDER — FLUMAZENIL 0.1 MG/ML
0.2 INJECTION, SOLUTION INTRAVENOUS
Status: CANCELLED | OUTPATIENT
Start: 2021-01-22 | End: 2021-01-22

## 2021-01-22 RX ORDER — NALOXONE HYDROCHLORIDE 0.4 MG/ML
0.4 INJECTION, SOLUTION INTRAMUSCULAR; INTRAVENOUS; SUBCUTANEOUS
Status: CANCELLED | OUTPATIENT
Start: 2021-01-22 | End: 2021-01-23

## 2021-01-22 RX ORDER — SODIUM CHLORIDE, SODIUM LACTATE, POTASSIUM CHLORIDE, AND CALCIUM CHLORIDE .6; .31; .03; .02 G/100ML; G/100ML; G/100ML; G/100ML
IRRIGANT IRRIGATION ONCE
Status: DISCONTINUED | OUTPATIENT
Start: 2021-01-22 | End: 2021-01-23 | Stop reason: HOSPADM

## 2021-01-22 ASSESSMENT — MIFFLIN-ST. JEOR: SCORE: 1892.1

## 2021-01-22 NOTE — DISCHARGE INSTRUCTIONS
Discharge Instructions after Colonoscopy  or Sigmoidoscopy    Today you had a __x__ Colonoscopy ____ Sigmoidoscopy    Activity and Diet  You were given medicine for pain. You may be dizzy or sleepy.  For 24 hours:    Do not drive or use heavy equipment.    Do not make important decisions.    Do not drink any alcohol.  You may return to your normal diet and medicines.    Discomfort    Air was placed in your colon during the exam in order to see it. Walking helps to pass the air.    You may take Tylenol (acetaminophen) for pain unless your doctor has told you not to.  Do not take aspirin or ibuprofen (Advil, Motrin, or other anti-inflammatory  drugs) for _3____ days.    Follow-up  ____ We took small tissue samples or polyps to study. Your doctor will call you with the results  within two weeks.    When to call:    Call right away if you have:    Unusual pain in belly or chest pain not relieved with passing air.    More than 1 to 2 Tablespoons of bleeding from your rectum.    Fever above 100.6  F (37.5  C).    If you have severe pain, bleeding, or shortness of breath, go to an emergency room.    If you have questions, call:  Monday to Friday, 7 a.m. to 4:30 p.m.  Endoscopy: [phone] (We may have to call you back)    After hours  Hospital: [phone] (Ask for the GI fellow on call)

## 2021-01-22 NOTE — LETTER
January 26, 2021      Cristo Dong  [address] [facility] [address]        Dear ,    The pathology results returned from the polyp removed at your recent colonoscopy.    The polyp was pre-cancerous but showed no active evidence of cancer.      Due to the microscopic appearance of the polyp, you will require slightly closer monitoring in the future.    Current guidelines recommend that you undergo a follow-up colonoscopy in 7-10 years.    Sincerely,               Hermilo Leon MD   [facility] [place], ENDOSCOPY  [address] [place] [address]  Phone: [phone]

## 2021-01-26 LAB — COPATH REPORT: NORMAL

## 2021-03-09 ENCOUNTER — MYC MEDICAL ADVICE (OUTPATIENT)
Dept: FAMILY MEDICINE | Facility: CLINIC | Age: 46
End: 2021-03-09

## 2021-05-03 ENCOUNTER — MYC MEDICAL ADVICE (OUTPATIENT)
Dept: FAMILY MEDICINE | Facility: CLINIC | Age: 46
End: 2021-05-03

## 2021-05-03 NOTE — TELEPHONE ENCOUNTER
Patient reporting symptoms of insect bite - concern for tick - lymes disease - requesting advise and possibly treatment  - triaged patient - will need an appointment for new symptoms, order, prescriptions.    Created triage TE

## 2021-05-04 ENCOUNTER — VIRTUAL VISIT (OUTPATIENT)
Dept: FAMILY MEDICINE | Facility: CLINIC | Age: 46
End: 2021-05-04
Payer: COMMERCIAL
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
## 2021-05-04 DIAGNOSIS — S10.96XA TICK BITE OF NECK, INITIAL ENCOUNTER: Primary | ICD-10-CM

## 2021-05-04 PROCEDURE — 99213 OFFICE O/P EST LOW 20 MIN: CPT | Mod: GT | Performed by: FAMILY MEDICINE

## 2021-05-04 RX ORDER — DOXYCYCLINE HYCLATE 100 MG
100 TABLET ORAL 2 TIMES DAILY
Qty: 20 TABLET | Refills: 0 | Status: SHIPPED | OUTPATIENT
Start: 2021-05-04 | End: 2021-05-14

## 2021-05-04 NOTE — PROGRESS NOTES
Cristo is a 46 year old who is being evaluated via a billable video visit.      How would you like to obtain your AVS? MyChart  If the video visit is dropped, the invitation should be resent by: Text to cell phone: [phone] (M)   Will anyone else be joining your video visit? No    Video Start Time: 1:27 PM    Assessment & Plan     Tick bite of neck, initial encounter  Will treat presumptively for lyme disease as below, see orders  - doxycycline hyclate (VIBRA-TABS) 100 MG tablet; Take 1 tablet (100 mg) by mouth 2 times daily for 10 days    Return in about 1 week (around 5/11/2021) for follow up if not improving.    Keesha Miranda MD  [facility]    Subjective   Cristo is a 46 year old who presents for the following health issues     HPI       Insect/bug bite-     Onset:saturday     Description:        Type of insect: tick bite         Location of bite: neck lower part - middle     Accompanying Signs & Symptoms:        Itching: no              Redness: slight         Warmth: no        Swelling: YES        Pain: none         Drainage: no        Fever: no        Chest Pain: no        Shortness of breath: no                              History of allergic reactions:    Anaphylaxis: no  Hives: no       If so, did you have/use an epi-pen:  no    Therapies tried and outcome: neosporin  with moderate relief            Review of Systems   Constitutional, HEENT, cardiovascular, pulmonary, GI, , musculoskeletal, neuro, skin, endocrine and psych systems are negative, except as otherwise noted.      Objective           Vitals:  No vitals were obtained today due to virtual visit.    Physical Exam   GENERAL: Healthy, alert and no distress  EYES: Eyes grossly normal to inspection.  No discharge or erythema, or obvious scleral/conjunctival abnormalities.  RESP: No audible wheeze, cough, or visible cyanosis.  No visible retractions or increased work of breathing.    SKIN: small scabbed area of  right anterior neck, Cristo has cell phone picture of same lesion a few days ago with surrounding erythema with inner pallor  NEURO: Cranial nerves grossly intact.  Mentation and speech appropriate for age.  PSYCH: Mentation appears normal, affect normal/bright, judgement and insight intact, normal speech and appearance well-groomed.                Video-Visit Details    Type of service:  Video Visit    Video End Time:1:40 PM    Originating Location (pt. Location): Home    Distant Location (provider location):  [facility]     Platform used for Video Visit: [employer]

## 2021-05-21 DIAGNOSIS — Z51.81 ENCOUNTER FOR THERAPEUTIC DRUG MONITORING: ICD-10-CM

## 2021-05-21 DIAGNOSIS — I82.409 RECURRENT DEEP VEIN THROMBOSIS (DVT) (H): Primary | ICD-10-CM

## 2021-05-25 PROBLEM — I82.441 ACUTE DEEP VEIN THROMBOSIS (DVT) OF TIBIAL VEIN OF RIGHT LOWER EXTREMITY (H): Status: RESOLVED | Noted: 2017-05-30 | Resolved: 2021-05-25

## 2021-05-25 PROBLEM — I82.431 ACUTE DEEP VEIN THROMBOSIS (DVT) OF POPLITEAL VEIN OF RIGHT LOWER EXTREMITY (H): Status: RESOLVED | Noted: 2017-05-30 | Resolved: 2021-05-25

## 2021-05-25 PROBLEM — I26.99 OTHER ACUTE PULMONARY EMBOLISM WITHOUT ACUTE COR PULMONALE (H): Status: RESOLVED | Noted: 2017-05-30 | Resolved: 2021-05-25

## 2021-05-25 PROBLEM — A49.02 MRSA INFECTION: Status: RESOLVED | Noted: 2017-08-31 | Resolved: 2021-05-25

## 2021-05-25 NOTE — TELEPHONE ENCOUNTER
Recent Labs   Lab Test 05/19/20  0712 02/21/17  0810 04/17/14  0907   CHOL 161 202* 152   HDL 68 62 48   LDL 83 126* 84   TRIG 52 70 101   CHOLHDLRATIO  --   --  3.2

## 2021-06-02 ENCOUNTER — TELEPHONE (OUTPATIENT)
Dept: FAMILY MEDICINE | Facility: CLINIC | Age: 46
End: 2021-06-02

## 2021-06-02 NOTE — TELEPHONE ENCOUNTER
[facility] needed provider clinic address, phone, and fax.  Gave this to them for the rx written by Dr. Miranda on 5/25/21.  GIOVANNI Fagan

## 2021-08-03 ENCOUNTER — MYC MEDICAL ADVICE (OUTPATIENT)
Dept: FAMILY MEDICINE | Facility: CLINIC | Age: 46
End: 2021-08-03

## 2021-08-03 DIAGNOSIS — R03.0 ELEVATED BP WITHOUT DIAGNOSIS OF HYPERTENSION: ICD-10-CM

## 2021-08-03 DIAGNOSIS — Z11.59 SCREENING FOR VIRAL DISEASE: ICD-10-CM

## 2021-08-03 DIAGNOSIS — E55.9 VITAMIN D DEFICIENCY: ICD-10-CM

## 2021-08-03 DIAGNOSIS — Z13.6 CARDIOVASCULAR SCREENING; LDL GOAL LESS THAN 130: Primary | ICD-10-CM

## 2021-08-03 DIAGNOSIS — I82.409 RECURRENT DEEP VEIN THROMBOSIS (DVT) (H): ICD-10-CM

## 2021-08-11 ENCOUNTER — LAB (OUTPATIENT)
Dept: LAB | Facility: CLINIC | Age: 46
End: 2021-08-11
Payer: COMMERCIAL

## 2021-08-11 DIAGNOSIS — R03.0 ELEVATED BP WITHOUT DIAGNOSIS OF HYPERTENSION: ICD-10-CM

## 2021-08-11 DIAGNOSIS — Z11.59 SCREENING FOR VIRAL DISEASE: ICD-10-CM

## 2021-08-11 DIAGNOSIS — I82.409 RECURRENT DEEP VEIN THROMBOSIS (DVT) (H): ICD-10-CM

## 2021-08-11 DIAGNOSIS — E55.9 VITAMIN D DEFICIENCY: ICD-10-CM

## 2021-08-11 DIAGNOSIS — Z13.6 CARDIOVASCULAR SCREENING; LDL GOAL LESS THAN 130: ICD-10-CM

## 2021-08-11 LAB
ANION GAP SERPL CALCULATED.3IONS-SCNC: 5 MMOL/L (ref 3–14)
BASOPHILS # BLD AUTO: 0 10E3/UL (ref 0–0.2)
BASOPHILS NFR BLD AUTO: 1 %
BUN SERPL-MCNC: 14 MG/DL (ref 7–30)
CALCIUM SERPL-MCNC: 9 MG/DL (ref 8.5–10.1)
CHLORIDE BLD-SCNC: 109 MMOL/L (ref 94–109)
CHOLEST SERPL-MCNC: 184 MG/DL
CO2 SERPL-SCNC: 24 MMOL/L (ref 20–32)
CREAT SERPL-MCNC: 0.89 MG/DL (ref 0.66–1.25)
DEPRECATED CALCIDIOL+CALCIFEROL SERPL-MC: 24 UG/L (ref 20–75)
EOSINOPHIL # BLD AUTO: 0.2 10E3/UL (ref 0–0.7)
EOSINOPHIL NFR BLD AUTO: 5 %
ERYTHROCYTE [DISTWIDTH] IN BLOOD BY AUTOMATED COUNT: 12.5 % (ref 10–15)
FASTING STATUS PATIENT QL REPORTED: YES
GFR SERPL CREATININE-BSD FRML MDRD: >90 ML/MIN/1.73M2
GLUCOSE BLD-MCNC: 99 MG/DL (ref 70–99)
HCT VFR BLD AUTO: 41.3 % (ref 40–53)
HDLC SERPL-MCNC: 62 MG/DL
HGB BLD-MCNC: 13.6 G/DL (ref 13.3–17.7)
IMM GRANULOCYTES # BLD: 0 10E3/UL
IMM GRANULOCYTES NFR BLD: 0 %
LDLC SERPL CALC-MCNC: 107 MG/DL
LYMPHOCYTES # BLD AUTO: 1.8 10E3/UL (ref 0.8–5.3)
LYMPHOCYTES NFR BLD AUTO: 43 %
MCH RBC QN AUTO: 27.6 PG (ref 26.5–33)
MCHC RBC AUTO-ENTMCNC: 32.9 G/DL (ref 31.5–36.5)
MCV RBC AUTO: 84 FL (ref 78–100)
MONOCYTES # BLD AUTO: 0.4 10E3/UL (ref 0–1.3)
MONOCYTES NFR BLD AUTO: 9 %
NEUTROPHILS # BLD AUTO: 1.7 10E3/UL (ref 1.6–8.3)
NEUTROPHILS NFR BLD AUTO: 42 %
NONHDLC SERPL-MCNC: 122 MG/DL
PLATELET # BLD AUTO: 232 10E3/UL (ref 150–450)
POTASSIUM BLD-SCNC: 4.2 MMOL/L (ref 3.4–5.3)
RBC # BLD AUTO: 4.92 10E6/UL (ref 4.4–5.9)
SODIUM SERPL-SCNC: 138 MMOL/L (ref 133–144)
TRIGL SERPL-MCNC: 75 MG/DL
WBC # BLD AUTO: 4.1 10E3/UL (ref 4–11)

## 2021-08-11 PROCEDURE — 99000 SPECIMEN HANDLING OFFICE-LAB: CPT

## 2021-08-11 PROCEDURE — 85025 COMPLETE CBC W/AUTO DIFF WBC: CPT

## 2021-08-11 PROCEDURE — 36415 COLL VENOUS BLD VENIPUNCTURE: CPT

## 2021-08-11 PROCEDURE — 86769 SARS-COV-2 COVID-19 ANTIBODY: CPT | Mod: 90

## 2021-08-11 PROCEDURE — 80061 LIPID PANEL: CPT

## 2021-08-11 PROCEDURE — 80048 BASIC METABOLIC PNL TOTAL CA: CPT

## 2021-08-11 PROCEDURE — 82306 VITAMIN D 25 HYDROXY: CPT

## 2021-08-12 LAB
SARS-COV-2 AB SERPL IA-ACNC: >250 U/ML
SARS-COV-2 AB SERPL QL IA: POSITIVE

## 2021-09-19 ENCOUNTER — HEALTH MAINTENANCE LETTER (OUTPATIENT)
Age: 46
End: 2021-09-19

## 2022-01-09 ENCOUNTER — HEALTH MAINTENANCE LETTER (OUTPATIENT)
Age: 47
End: 2022-01-09

## 2022-05-06 ENCOUNTER — MYC MEDICAL ADVICE (OUTPATIENT)
Dept: FAMILY MEDICINE | Facility: CLINIC | Age: 47
End: 2022-05-06
Payer: COMMERCIAL

## 2022-05-13 ENCOUNTER — OFFICE VISIT (OUTPATIENT)
Dept: URGENT CARE | Facility: URGENT CARE | Age: 47
End: 2022-05-13
Payer: COMMERCIAL

## 2022-05-13 VITALS
DIASTOLIC BLOOD PRESSURE: 92 MMHG | WEIGHT: 248 LBS | TEMPERATURE: 100.2 F | OXYGEN SATURATION: 99 % | HEART RATE: 72 BPM | SYSTOLIC BLOOD PRESSURE: 146 MMHG | BODY MASS INDEX: 34.72 KG/M2 | HEIGHT: 71 IN

## 2022-05-13 DIAGNOSIS — R09.81 NASAL CONGESTION: ICD-10-CM

## 2022-05-13 DIAGNOSIS — R50.9 FEVER, UNSPECIFIED FEVER CAUSE: ICD-10-CM

## 2022-05-13 DIAGNOSIS — J06.9 VIRAL URI: Primary | ICD-10-CM

## 2022-05-13 PROCEDURE — U0003 INFECTIOUS AGENT DETECTION BY NUCLEIC ACID (DNA OR RNA); SEVERE ACUTE RESPIRATORY SYNDROME CORONAVIRUS 2 (SARS-COV-2) (CORONAVIRUS DISEASE [COVID-19]), AMPLIFIED PROBE TECHNIQUE, MAKING USE OF HIGH THROUGHPUT TECHNOLOGIES AS DESCRIBED BY CMS-2020-01-R: HCPCS | Performed by: FAMILY MEDICINE

## 2022-05-13 PROCEDURE — U0005 INFEC AGEN DETEC AMPLI PROBE: HCPCS | Performed by: FAMILY MEDICINE

## 2022-05-13 PROCEDURE — 99213 OFFICE O/P EST LOW 20 MIN: CPT | Performed by: FAMILY MEDICINE

## 2022-05-13 NOTE — PROGRESS NOTES
Assessment & Plan     1. Fever, unspecified fever cause  -Likely due to upper respiratory virus. Patient concerned of recurrent rectal abscess but normal rectal exam, no signs of abscess. Patient also concerned that low-grade temps happen after he stopped drinking 3 days prior-not likely a withdrawal symptom but possible. Can journal to see if this happens consistently. Patient does not drink consistently, so not concerned for withdrawal symptoms.   - Symptomatic; Yes; 5/10/2022 COVID-19 Virus (Coronavirus) by PCR Nose    2. Nasal congestion  -Likely due to virus. Can try taking allergy medication.       3. Viral URI  -Will obtain COVID PCR to rule out. May have been exposed at [employer] and/or work. He also has upcoming travel for work.    - Symptomatic; Yes; 5/10/2022 COVID-19 Virus (Coronavirus) by PCR Nose    Acetaminophen (Tylenol) 500mg tablets.  You may take 2 tabs every 4-6 hours as needed  Ibuprofen (Advil, Motrin) 200mg tablets.  You may take 3 tabs every 6-8 hours as needed with food.  Can try claritin or zyrtec once daily as needed  Follow-up if any new or worsening symptoms develop    Follow-up if not improving or worsening    Caroline Levine MD  [facility] [place]    Subjective    JASEN Lemons is a 47 year old who presents to urgent care for low grade temperature. The low-grade temperature, cough, and fatigue started a couple days ago. He denies shortness of breath. He did take two at-home COVID tests, which were both negative. He also reports that on April 7th was on a trip on [place], had fever and was diagnosed with perianal abscess The ER drained it and the patient took Augmentin for 10 days and felt better and it is healed now. He does feel pressure on his left thigh and is concerned for returning perianal abscess. He also reports he cut back on his drinking 3 weeks ago. He was drinking up to 4 drinks per night and now he drinks about twice a week.          Review of  "Systems   Constitutional, HEENT, cardiovascular, pulmonary, gi and gu systems are negative, except as otherwise noted.      Objective    BP (!) 146/92   Pulse 72   Temp 100.2  F (37.9  C) (Temporal)   Ht 1.803 m (5' 11\")   Wt 112.5 kg (248 lb)   SpO2 99%   BMI 34.59 kg/m    Body mass index is 34.59 kg/m .  Physical Exam   GENERAL: healthy, alert and no distress  HENT: ear canals and TM's normal, nose and mouth without ulcers or lesions. Scarring on bilateral tympanic membranes. Bilateral nares with clear rhinorrhea and erythema.   RESP: lungs clear to auscultation - no rales, rhonchi or wheezes  CV: regular rate and rhythm, normal S1 S2, no S3 or S4, no murmur, click or rub, no peripheral edema and peripheral pulses strong  RECTAL (male): No redness, drainage, swelling, or erythema present. No tenderness to palpitation. Soft to touch.       COVID PCR: pending     This patient was seen along with, Lynnette Sheridan-student, history and review of systems obtained by student and confirmed by attending. Objective, exams, assessment and plan reviewed with attending.     MYNOR Collado Student     Pertinent history and physical exam done.  Discussed plan with patient and student.  Note addended as needed.  Caroline Levine MD              "

## 2022-05-13 NOTE — PATIENT INSTRUCTIONS
Acetaminophen (Tylenol) 500mg tablets.  You may take 2 tabs every 4-6 hours as needed  Ibuprofen (Advil, Motrin) 200mg tablets.  You may take 3 tabs every 6-8 hours as needed with food.  Can try claritin or zyrtec once daily as needed  Follow-up if any new or worsening symptoms develop

## 2022-05-14 LAB — SARS-COV-2 RNA RESP QL NAA+PROBE: POSITIVE

## 2022-05-17 ENCOUNTER — MYC MEDICAL ADVICE (OUTPATIENT)
Dept: FAMILY MEDICINE | Facility: CLINIC | Age: 47
End: 2022-05-17
Payer: COMMERCIAL

## 2022-05-19 NOTE — TELEPHONE ENCOUNTER
Writer responded via [employer].    BRITT HammerN, RN  [facility]

## 2022-05-19 NOTE — TELEPHONE ENCOUNTER
Dr. Miranda-Please review pended COVID-19 recovery letter and electronically sign if agree.    Thank you!  BRITT HammerN, RN  [facility]  5/13

## 2022-05-23 NOTE — TELEPHONE ENCOUNTER
Writer responded via [email or web address].    BRITT HammerN, RN  [facility]

## 2022-11-07 NOTE — NURSING NOTE
"Chief Complaint   Patient presents with     Low oxygen level       Initial /82 (Cuff Size: Adult Large)  Pulse 68  Temp 98.8  F (37.1  C) (Oral)  Resp 16  Ht 5' 10.5\" (1.791 m)  Wt 245 lb 12 oz (111.5 kg)  SpO2 98%  BMI 34.76 kg/m2 Estimated body mass index is 34.76 kg/(m^2) as calculated from the following:    Height as of this encounter: 5' 10.5\" (1.791 m).    Weight as of this encounter: 245 lb 12 oz (111.5 kg).  Medication Reconciliation: complete     Reny Hernández CMA      " Propranolol Pregnancy And Lactation Text: This medication is Pregnancy Category C and it isn't known if it is safe during pregnancy. It is excreted in breast milk.

## 2022-11-21 ENCOUNTER — HEALTH MAINTENANCE LETTER (OUTPATIENT)
Age: 47
End: 2022-11-21

## 2023-03-30 ENCOUNTER — OFFICE VISIT (OUTPATIENT)
Dept: FAMILY MEDICINE | Facility: CLINIC | Age: 48
End: 2023-03-30
Payer: COMMERCIAL

## 2023-03-30 VITALS
HEART RATE: 72 BPM | WEIGHT: 252 LBS | BODY MASS INDEX: 36.08 KG/M2 | TEMPERATURE: 97.2 F | RESPIRATION RATE: 15 BRPM | HEIGHT: 70 IN | SYSTOLIC BLOOD PRESSURE: 138 MMHG | DIASTOLIC BLOOD PRESSURE: 91 MMHG | OXYGEN SATURATION: 96 %

## 2023-03-30 DIAGNOSIS — B35.1 ONYCHOMYCOSIS: ICD-10-CM

## 2023-03-30 DIAGNOSIS — Z00.00 ROUTINE GENERAL MEDICAL EXAMINATION AT A HEALTH CARE FACILITY: Primary | ICD-10-CM

## 2023-03-30 DIAGNOSIS — M21.612 BUNION, LEFT: ICD-10-CM

## 2023-03-30 DIAGNOSIS — Z13.6 CARDIOVASCULAR SCREENING; LDL GOAL LESS THAN 130: ICD-10-CM

## 2023-03-30 DIAGNOSIS — E66.01 MORBID OBESITY (H): ICD-10-CM

## 2023-03-30 DIAGNOSIS — M21.611 BUNION, RIGHT: ICD-10-CM

## 2023-03-30 DIAGNOSIS — I82.409 RECURRENT DEEP VEIN THROMBOSIS (DVT) (H): ICD-10-CM

## 2023-03-30 DIAGNOSIS — G47.33 OSA (OBSTRUCTIVE SLEEP APNEA): ICD-10-CM

## 2023-03-30 DIAGNOSIS — Z51.81 ENCOUNTER FOR THERAPEUTIC DRUG MONITORING: ICD-10-CM

## 2023-03-30 DIAGNOSIS — R06.83 SNORING: ICD-10-CM

## 2023-03-30 DIAGNOSIS — R73.01 IMPAIRED FASTING GLUCOSE: ICD-10-CM

## 2023-03-30 DIAGNOSIS — E55.9 VITAMIN D DEFICIENCY: ICD-10-CM

## 2023-03-30 DIAGNOSIS — L72.3 SEBACEOUS CYST: ICD-10-CM

## 2023-03-30 DIAGNOSIS — Z11.59 NEED FOR HEPATITIS C SCREENING TEST: ICD-10-CM

## 2023-03-30 PROBLEM — E66.9 OBESITY, UNSPECIFIED CLASSIFICATION, UNSPECIFIED OBESITY TYPE, UNSPECIFIED WHETHER SERIOUS COMORBIDITY PRESENT: Status: RESOLVED | Noted: 2018-11-12 | Resolved: 2023-03-30

## 2023-03-30 LAB
ALBUMIN SERPL BCG-MCNC: 4.3 G/DL (ref 3.5–5.2)
ALP SERPL-CCNC: 52 U/L (ref 40–129)
ALT SERPL W P-5'-P-CCNC: 36 U/L (ref 10–50)
ANION GAP SERPL CALCULATED.3IONS-SCNC: 12 MMOL/L (ref 7–15)
AST SERPL W P-5'-P-CCNC: 31 U/L (ref 10–50)
BILIRUB SERPL-MCNC: 0.6 MG/DL
BUN SERPL-MCNC: 13.9 MG/DL (ref 6–20)
CALCIUM SERPL-MCNC: 9.7 MG/DL (ref 8.6–10)
CHLORIDE SERPL-SCNC: 104 MMOL/L (ref 98–107)
CHOLEST SERPL-MCNC: 199 MG/DL
CREAT SERPL-MCNC: 0.77 MG/DL (ref 0.67–1.17)
DEPRECATED HCO3 PLAS-SCNC: 22 MMOL/L (ref 22–29)
GFR SERPL CREATININE-BSD FRML MDRD: >90 ML/MIN/1.73M2
GLUCOSE SERPL-MCNC: 103 MG/DL (ref 70–99)
HDLC SERPL-MCNC: 62 MG/DL
LDLC SERPL CALC-MCNC: 113 MG/DL
NONHDLC SERPL-MCNC: 137 MG/DL
POTASSIUM SERPL-SCNC: 4.7 MMOL/L (ref 3.4–5.3)
PROT SERPL-MCNC: 7.3 G/DL (ref 6.4–8.3)
SODIUM SERPL-SCNC: 138 MMOL/L (ref 136–145)
TRIGL SERPL-MCNC: 118 MG/DL

## 2023-03-30 PROCEDURE — 80061 LIPID PANEL: CPT | Performed by: FAMILY MEDICINE

## 2023-03-30 PROCEDURE — 90471 IMMUNIZATION ADMIN: CPT | Performed by: FAMILY MEDICINE

## 2023-03-30 PROCEDURE — 80053 COMPREHEN METABOLIC PANEL: CPT | Performed by: FAMILY MEDICINE

## 2023-03-30 PROCEDURE — 90636 HEP A/HEP B VACC ADULT IM: CPT | Performed by: FAMILY MEDICINE

## 2023-03-30 PROCEDURE — 36415 COLL VENOUS BLD VENIPUNCTURE: CPT | Performed by: FAMILY MEDICINE

## 2023-03-30 PROCEDURE — 99214 OFFICE O/P EST MOD 30 MIN: CPT | Mod: 25 | Performed by: FAMILY MEDICINE

## 2023-03-30 PROCEDURE — 99396 PREV VISIT EST AGE 40-64: CPT | Mod: 25 | Performed by: FAMILY MEDICINE

## 2023-03-30 RX ORDER — TERBINAFINE HYDROCHLORIDE 250 MG/1
250 TABLET ORAL DAILY
Qty: 90 TABLET | Refills: 3 | Status: SHIPPED | OUTPATIENT
Start: 2023-03-30 | End: 2023-07-25

## 2023-03-30 ASSESSMENT — PAIN SCALES - GENERAL: PAINLEVEL: NO PAIN (0)

## 2023-03-30 ASSESSMENT — ENCOUNTER SYMPTOMS
DIARRHEA: 0
FEVER: 0
ABDOMINAL PAIN: 0
DYSURIA: 0
CHILLS: 0
NAUSEA: 0
ARTHRALGIAS: 0
HEADACHES: 0
NERVOUS/ANXIOUS: 0
HEMATURIA: 0
COUGH: 0
SHORTNESS OF BREATH: 0
EYE PAIN: 0
DIZZINESS: 0
WEAKNESS: 0
FREQUENCY: 0
PARESTHESIAS: 0
MYALGIAS: 0
SORE THROAT: 0
PALPITATIONS: 0
JOINT SWELLING: 0
HEMATOCHEZIA: 0
CONSTIPATION: 0
HEARTBURN: 0

## 2023-03-30 NOTE — PATIENT INSTRUCTIONS
Blood pressure:  You're doing GREAT with routine exercise, swimming, regular walking and lower salt diet.  Alcohol and possibly undertreated REJI probably contributing.  If BP persists in 130's - 140's / 80's - 90's in 5-6 months, I would recommend starting blood pressure medication.    Preventive Health Recommendations  Male Ages 40 to 49    Yearly exam:             See your health care provider every year in order to  o   Review health changes.   o   Discuss preventive care.    o   Review your medicines if your doctor has prescribed any.  You should be tested each year for STDs (sexually transmitted diseases) if you re at risk.   Have a cholesterol test every 5 years.   Have a colonoscopy (test for colon cancer) if someone in your family has had colon cancer or polyps before age 50.   After age 45, have a diabetes test (fasting glucose). If you are at risk for diabetes, you should have this test every 3 years.    Talk with your health care provider about whether or not a prostate cancer screening test (PSA) is right for you.    Shots: Get a flu shot each year. Get a tetanus shot every 10 years.     Nutrition:  Eat at least 5 servings of fruits and vegetables daily.   Eat whole-grain bread, whole-wheat pasta and brown rice instead of white grains and rice.   Get adequate Calcium and Vitamin D.     Lifestyle  Exercise for at least 150 minutes a week (30 minutes a day, 5 days a week). This will help you control your weight and prevent disease.   Limit alcohol to one drink per day.   No smoking.   Wear sunscreen to prevent skin cancer.   See your dentist every six months for an exam and cleaning.

## 2023-03-30 NOTE — NURSING NOTE
Prior to immunization administration, verified patients identity using patient s name and date of birth. Please see Immunization Activity for additional information.     Screening Questionnaire for Adult Immunization    Are you sick today?   No   Do you have allergies to medications, food, a vaccine component or latex?   No   Have you ever had a serious reaction after receiving a vaccination?   No   Do you have a long-term health problem with heart, lung, kidney, or metabolic disease (e.g., diabetes), asthma, a blood disorder, no spleen, complement component deficiency, a cochlear implant, or a spinal fluid leak?  Are you on long-term aspirin therapy?   No   Do you have cancer, leukemia, HIV/AIDS, or any other immune system problem?   No   Do you have a parent, brother, or sister with an immune system problem?   No   In the past 3 months, have you taken medications that affect  your immune system, such as prednisone, other steroids, or anticancer drugs; drugs for the treatment of rheumatoid arthritis, Crohn s disease, or psoriasis; or have you had radiation treatments?   No   Have you had a seizure, or a brain or other nervous system problem?   No   During the past year, have you received a transfusion of blood or blood    products, or been given immune (gamma) globulin or antiviral drug?   No   For women: Are you pregnant or is there a chance you could become       pregnant during the next month?   No   Have you received any vaccinations in the past 4 weeks?   No     Immunization questionnaire answers were all negative.      Injection of Twinrix Hep A/Hep B given by Katie Ariza MA. Patient instructed to remain in clinic for 15 minutes afterwards, and to report any adverse reactions.     Screening performed by Katie Ariza MA on 3/30/2023 at 8:45 AM.

## 2023-03-30 NOTE — PROGRESS NOTES
SUBJECTIVE:   CC: Cristo is an 48 year old who presents for preventative health visit.   Additional Questions 3/30/2023   Roomed by lorna chau   Accompanied by none   Patient has been advised of split billing requirements and indicates understanding: Yes  Healthy Habits:     Getting at least 3 servings of Calcium per day:  Yes    Bi-annual eye exam:  Yes    Dental care twice a year:  Yes    Sleep apnea or symptoms of sleep apnea:  Sleep apnea    Diet:  Regular (no restrictions)    Frequency of exercise:  2-3 days/week    Duration of exercise:  30-45 minutes    Taking medications regularly:  Yes    Medication side effects:  None    PHQ-2 Total Score: 0    Additional concerns today:  Yes    - derm - bump on head and back     - toe nail fungus     - leg check due to blood clots    1. Weight  He was able to lose 30 lbs during covid quarantine with increasing exercise, but now that he's traveling more he's more sedentary and diet may not be as healthy (perhaps more carbs). He's noticed he's very sensitive to carbohydrates in his diet and gains weight easily when eating more carbohydrates (and his son is a good baker).  Exercising over the winter has been harder but he's optimistic that he'll be able to get out more once the sidewalks are less snowy.    2. REJI  He was diagnosed a long time ago with REJI but hasn't used a CPAP. His children have noticed that he snores, and he's noticed vivid dreams, so he's wondering if he is having more sleep interruptions. He would like a referral to follow up on this to the sleep center.    3. Cyst on back  Persistent bum on left upper back, usually nontender, but can get inflammed and drain, he'd like this looked at today.    5. recurrrent DVT  He takes Xarelto and used compression socks when traveling. No current bothersome symptoms.    6. Bilateral bunions  Cristo reports that he has not pain and is worried that surgety would be helpful, since he can walk and run without pain. He has  found shoes that fit well. Compression socks can hurt sometimes.    He also notes that both great toes have toenail fungus and he would like this treated today.  Wt Readings from Last 4 Encounters:   03/30/23 114.3 kg (252 lb)   05/13/22 112.5 kg (248 lb)   01/22/21 99.8 kg (220 lb)   08/14/19 108.9 kg (240 lb)       Today's PHQ-2 Score:   PHQ-2 ( 1999 Pfizer) 3/30/2023   Q1: Little interest or pleasure in doing things 0   Q2: Feeling down, depressed or hopeless 0   PHQ-2 Score 0   PHQ-2 Total Score (12-17 Years)- Positive if 3 or more points; Administer PHQ-A if positive -   Q1: Little interest or pleasure in doing things Not at all   Q2: Feeling down, depressed or hopeless Not at all   PHQ-2 Score 0       Have you ever done Advance Care Planning? (For example, a Health Directive, POLST, or a discussion with a medical provider or your loved ones about your wishes): No, advance care planning information given to patient to review.  Patient plans to discuss their wishes with loved ones or provider.      Social History     Tobacco Use     Smoking status: Never     Passive exposure: Never     Smokeless tobacco: Never   Substance Use Topics     Alcohol use: Yes     Alcohol/week: 3.3 standard drinks     Comment: 4-6 drinks per wk       Alcohol Use 3/30/2023   Prescreen: >3 drinks/day or >7 drinks/week? Yes   AUDIT SCORE  6     AUDIT - Alcohol Use Disorders Identification Test - Reproduced from the World Health Organization Audit 2001 (Second Edition) 3/30/2023   1.  How often do you have a drink containing alcohol? 4 or more times a week   2.  How many drinks containing alcohol do you have on a typical day when you are drinking? 1 or 2   3.  How often do you have five or more drinks on one occasion? Less than monthly   4.  How often during the last year have you found that you were not able to stop drinking once you had started? Never   5.  How often during the last year have you failed to do what was normally expected  of you because of drinking? Never   6.  How often during the last year have you needed a first drink in the morning to get yourself going after a heavy drinking session? Never   7.  How often during the last year have you had a feeling of guilt or remorse after drinking? Less than monthly   8.  How often during the last year have you been unable to remember what happened the night before because of your drinking? Never   9.  Have you or someone else been injured because of your drinking? No   10. Has a relative, friend, doctor or other health care worker been concerned about your drinking or suggested you cut down? No   TOTAL SCORE 6       Last PSA: No results found for: PSA    Reviewed orders with patient. Reviewed health maintenance and updated orders accordingly - Yes  BP Readings from Last 3 Encounters:   03/30/23 (!) 138/91   05/13/22 (!) 146/92   01/22/21 134/86    Wt Readings from Last 3 Encounters:   03/30/23 114.3 kg (252 lb)   05/13/22 112.5 kg (248 lb)   01/22/21 99.8 kg (220 lb)                  Patient Active Problem List   Diagnosis     Flat feet     Varicosities of leg     External hemorrhoids     Bunion, left     Bunion, right     Recurrent deep vein thrombosis (DVT) (H)     History of dysplastic nevus     Snoring     Nocturnal hypoxemia     REJI (obstructive sleep apnea)     Morbid obesity (H)     Onychomycosis     Past Surgical History:   Procedure Laterality Date     COLONOSCOPY N/A 1/22/2021    Procedure: COLONOSCOPY, WITH POLYPECTOMY;  Surgeon: Hermilo Naranjo MD;  [place] [facility] [place]     [facility] HAND/FINGER SURGERY UNLISTED      RT HAND INDEX FINGER NERVE DAMAGE     [facility] NERVE GRAFT,HAND/FOOT,ONE,=<4CM  1990    Nerve Graft - Right Hand     [facility] REPAIR CRUCIATE LIGAMENT,KNEE  1991    Right Knee       Social History     Tobacco Use     Smoking status: Never     Passive exposure: Never     Smokeless tobacco: Never   Substance Use Topics     Alcohol use: Yes     Alcohol/week: 3.3 standard drinks      Comment: 4-6 drinks per wk     Family History   Problem Relation Age of Onset     Diabetes Paternal Grandmother      Gastrointestinal Disease Paternal Grandfather         liver failure     Genitourinary Problems Paternal Grandfather         kidney failure     Neurologic Disorder Maternal Grandmother         Alzheimer's     Sleep Apnea Mother      Diabetes Mother         adult on set late in life likely due to obesity     Blood Disease Father         recurrent DVT (twice)     Sleep Apnea Father         mother     Hypertension Father          Current Outpatient Medications   Medication Sig Dispense Refill     terbinafine (LAMISIL) 250 MG tablet Take 1 tablet (250 mg) by mouth daily for 360 days 90 tablet 3     XARELTO ANTICOAGULANT 10 MG TABS tablet TAKE 1 TABLET DAILY WITH   DINNER. 90 tablet 3     Allergies   Allergen Reactions     Codeine GI Disturbance     Recent Labs   Lab Test 03/30/23  0818 08/11/21  0649 05/19/20  0712 06/18/19  1604 02/21/17  0810 06/30/15  0942   * 107* 83  --    < >  --    HDL 62 62 68  --    < >  --    TRIG 118 75 52  --    < >  --    ALT 36  --  28  --   --   --    CR 0.77 0.89 0.73 0.99   < >  --    GFRESTIMATED >90 >90 >90 >90   < >  --    GFRESTBLACK  --   --  >90 >90  --   --    POTASSIUM 4.7 4.2 3.8  --   --   --    TSH  --   --   --   --   --  1.30    < > = values in this interval not displayed.        Reviewed and updated as needed this visit by clinical staff   Tobacco  Allergies  Meds              Reviewed and updated as needed this visit by Provider                 Past Medical History:   Diagnosis Date     Other pulmonary embolism and infarction 9/2001    Pt was on coumadin for 5 years     Thrombosed hemorrhoids       Past Surgical History:   Procedure Laterality Date     COLONOSCOPY N/A 1/22/2021    Procedure: COLONOSCOPY, WITH POLYPECTOMY;  Surgeon: Hermilo Naranjo MD;  [place] [facility] [place]     [facility] HAND/FINGER SURGERY UNLISTED      RT HAND INDEX FINGER NERVE  "DAMAGE     [facility] NERVE GRAFT,HAND/FOOT,ONE,=<4CM  1990    Nerve Graft - Right Hand     [facility] REPAIR CRUCIATE LIGAMENT,KNEE  1991    Right Knee       Review of Systems   Constitutional: Negative for chills and fever.   HENT: Negative for congestion, ear pain, hearing loss and sore throat.    Eyes: Negative for pain and visual disturbance.   Respiratory: Negative for cough and shortness of breath.    Cardiovascular: Positive for peripheral edema. Negative for chest pain and palpitations.   Gastrointestinal: Negative for abdominal pain, constipation, diarrhea, heartburn, hematochezia and nausea.   Genitourinary: Negative for dysuria, frequency, genital sores, hematuria and urgency.   Musculoskeletal: Negative for arthralgias, joint swelling and myalgias.   Skin: Negative for rash.   Neurological: Negative for dizziness, weakness, headaches and paresthesias.   Psychiatric/Behavioral: Negative for mood changes. The patient is not nervous/anxious.      OBJECTIVE:   BP (!) 138/91 (BP Location: Right arm, Patient Position: Chair, Cuff Size: Adult Large)   Pulse 72   Temp 97.2  F (36.2  C) (Temporal)   Resp 15   Ht 1.765 m (5' 9.5\")   Wt 114.3 kg (252 lb)   SpO2 96%   BMI 36.68 kg/m      Physical Exam  GENERAL: healthy, alert and no distress  EYES: Eyes grossly normal to inspection, PERRL and conjunctivae and sclerae normal  NECK: no adenopathy, no asymmetry, masses, or scars and thyroid normal to palpation  RESP: lungs clear to auscultation - no rales, rhonchi or wheezes  CV: regular rate and rhythm, normal S1 S2, no S3 or S4, no murmur, click or rub, no peripheral edema and peripheral pulses strong  MS: no gross musculoskeletal defects noted, no edema  SKIN: no suspicious lesions or rashes and 1 cm subcutaneous, firm, non tender mass noted of left upper shoulder without drainage, redness or swelling. No overlying skin changes. Findings consistent with sebaceous cyst.  NEURO: Normal strength and tone, mentation intact " and speech normal  PSYCH: mentation appears normal, affect normal/bright  FEET: both great toenails are thickened, dark yellow. Significant bilateral great toe bunions noted with no significant callus, and normal capillary refill. No edema noted. Gait is normal.    Diagnostic Test Results:  Labs reviewed in Epic  Results for orders placed or performed in visit on 03/30/23 (from the past 24 hour(s))   CBC with Platelets & Differential *Canceled*    Narrative    The following orders were created for panel order CBC with Platelets & Differential.  Procedure                               Abnormality         Status                     ---------                               -----------         ------                     CBC with platelets and d...[[id number]]                                                   Please view results for these tests on the individual orders.   Comprehensive metabolic panel (BMP + Alb, Alk Phos, ALT, AST, Total. Bili, TP)   Result Value Ref Range    Sodium 138 136 - 145 mmol/L    Potassium 4.7 3.4 - 5.3 mmol/L    Chloride 104 98 - 107 mmol/L    Carbon Dioxide (CO2) 22 22 - 29 mmol/L    Anion Gap 12 7 - 15 mmol/L    Urea Nitrogen 13.9 6.0 - 20.0 mg/dL    Creatinine 0.77 0.67 - 1.17 mg/dL    Calcium 9.7 8.6 - 10.0 mg/dL    Glucose 103 (H) 70 - 99 mg/dL    Alkaline Phosphatase 52 40 - 129 U/L    AST 31 10 - 50 U/L    ALT 36 10 - 50 U/L    Protein Total 7.3 6.4 - 8.3 g/dL    Albumin 4.3 3.5 - 5.2 g/dL    Bilirubin Total 0.6 <=1.2 mg/dL    GFR Estimate >90 >60 mL/min/1.73m2   Lipid panel reflex to direct LDL Fasting   Result Value Ref Range    Cholesterol 199 <200 mg/dL    Triglycerides 118 <150 mg/dL    Direct Measure HDL 62 >=40 mg/dL    LDL Cholesterol Calculated 113 (H) <=100 mg/dL    Non HDL Cholesterol 137 (H) <130 mg/dL    Narrative    Cholesterol  Desirable:  <200 mg/dL    Triglycerides  Normal:  Less than 150 mg/dL  Borderline High:  150-199 mg/dL  High:  200-499 mg/dL  Very High:  Greater  than or equal to 500 mg/dL    Direct Measure HDL  Female:  Greater than or equal to 50 mg/dL   Male:  Greater than or equal to 40 mg/dL    LDL Cholesterol  Desirable:  <100mg/dL  Above Desirable:  100-129 mg/dL   Borderline High:  130-159 mg/dL   High:  160-189 mg/dL   Very High:  >= 190 mg/dL    Non HDL Cholesterol  Desirable:  130 mg/dL  Above Desirable:  130-159 mg/dL  Borderline High:  160-189 mg/dL  High:  190-219 mg/dL  Very High:  Greater than or equal to 220 mg/dL       ASSESSMENT/PLAN:   (Z00.00) Routine general medical examination at a health care facility  (primary encounter diagnosis)    (E66.01) Morbid obesity (H)  Comment: see HPI  Plan: recommended follow up sleep study, treatment as needed, as this would improve metabolic and cardiovascular health. Consider weight management clinic if desired.    (G47.33) REJI (obstructive sleep apnea)  with  (R06.83) Snoring  Comment: acute worsening, Refer to specialist for further evaluation and treatment as needed.   Plan: Adult Sleep Eval & Management          Referral            (I82.409) Recurrent deep vein thrombosis (DVT) (H)  Comment: stable, continue current treatment, monitoring labs as below.    Plan: CBC with Platelets & Differential,         Comprehensive metabolic panel (BMP + Alb, Alk         Phos, ALT, AST, Total. Bili, TP), CBC with         Platelets & Differential, INR point of care,         Interfaced Result, CANCELED: INR point of care        Will fu as indicated.     (M21.612) Bunion, left  (M21.611) Bunion, right  Comment: appear quite severe but asymptomatic.  Plan: consider referral to podiatry in future kaur if oral treatment for onchomycosis ineffective, in which case Cristo would need possible toenail removal by specialist.      (B35.1) Onychomycosis  Start treatment as below  Plan: Comprehensive metabolic panel (BMP + Alb, Alk         Phos, ALT, AST, Total. Bili, TP), terbinafine         (LAMISIL) 250 MG tablet          (Z13.6)  "CARDIOVASCULAR SCREENING; LDL GOAL LESS THAN 130  Comment:   LDL Cholesterol Calculated   Date Value Ref Range Status   03/30/2023 113 (H) <=100 mg/dL Final   05/19/2020 83 <100 mg/dL Final     Comment:     Desirable:       <100 mg/dl      Plan: Lipid panel reflex to direct LDL Fasting        At goal    (Z51.81) Encounter for therapeutic drug monitoring  Comment:   Plan:     (E55.9) Vitamin D deficiency  Comment: Will fu as indicated.   Plan: Vitamin D Deficiency, Vitamin D Deficiency            (Z11.59) Need for hepatitis C screening test  Comment: routine one time screening  Plan: Hepatitis C Screen Reflex to HCV RNA Quant and         Genotype, Hepatitis C Screen Reflex to HCV RNA         Quant and Genotype          (L72.3) Sebaceous cyst  Comment: reassurance    (R73.01) Impaired fasting glucose  Comment: mildly elevated glucose today, recommend follow up A1C  Plan: Hemoglobin A1c            Patient has been advised of split billing requirements and indicates understanding: Yes      COUNSELING:   Reviewed preventive health counseling, as reflected in patient instructions      BMI:   Estimated body mass index is 36.68 kg/m  as calculated from the following:    Height as of this encounter: 1.765 m (5' 9.5\").    Weight as of this encounter: 114.3 kg (252 lb).   Weight management plan: Discussed healthy diet and exercise guidelines      He reports that he has never smoked. He has never been exposed to tobacco smoke. He has never used smokeless tobacco.            Keesha Miranda MD  [facility]  "

## 2023-04-06 ENCOUNTER — LAB (OUTPATIENT)
Dept: LAB | Facility: CLINIC | Age: 48
End: 2023-04-06
Payer: COMMERCIAL

## 2023-04-06 DIAGNOSIS — R73.01 IMPAIRED FASTING GLUCOSE: ICD-10-CM

## 2023-04-06 DIAGNOSIS — I82.409 RECURRENT DEEP VEIN THROMBOSIS (DVT) (H): ICD-10-CM

## 2023-04-06 DIAGNOSIS — E55.9 VITAMIN D DEFICIENCY: ICD-10-CM

## 2023-04-06 DIAGNOSIS — Z11.59 NEED FOR HEPATITIS C SCREENING TEST: ICD-10-CM

## 2023-04-06 DIAGNOSIS — Z11.4 SCREENING FOR HIV (HUMAN IMMUNODEFICIENCY VIRUS): Primary | ICD-10-CM

## 2023-04-06 LAB
BASOPHILS # BLD AUTO: 0 10E3/UL (ref 0–0.2)
BASOPHILS NFR BLD AUTO: 1 %
DEPRECATED CALCIDIOL+CALCIFEROL SERPL-MC: 22 UG/L (ref 20–75)
EOSINOPHIL # BLD AUTO: 0.3 10E3/UL (ref 0–0.7)
EOSINOPHIL NFR BLD AUTO: 6 %
ERYTHROCYTE [DISTWIDTH] IN BLOOD BY AUTOMATED COUNT: 13 % (ref 10–15)
HBA1C MFR BLD: 5.3 % (ref 0–5.6)
HCT VFR BLD AUTO: 41.6 % (ref 40–53)
HCV AB SERPL QL IA: NONREACTIVE
HGB BLD-MCNC: 13.7 G/DL (ref 13.3–17.7)
HIV 1+2 AB+HIV1 P24 AG SERPL QL IA: NONREACTIVE
IMM GRANULOCYTES # BLD: 0 10E3/UL
IMM GRANULOCYTES NFR BLD: 0 %
INR BLD: 1 (ref 0.9–1.1)
LYMPHOCYTES # BLD AUTO: 2.2 10E3/UL (ref 0.8–5.3)
LYMPHOCYTES NFR BLD AUTO: 46 %
MCH RBC QN AUTO: 27.9 PG (ref 26.5–33)
MCHC RBC AUTO-ENTMCNC: 32.9 G/DL (ref 31.5–36.5)
MCV RBC AUTO: 85 FL (ref 78–100)
MONOCYTES # BLD AUTO: 0.4 10E3/UL (ref 0–1.3)
MONOCYTES NFR BLD AUTO: 8 %
NEUTROPHILS # BLD AUTO: 1.8 10E3/UL (ref 1.6–8.3)
NEUTROPHILS NFR BLD AUTO: 38 %
PLATELET # BLD AUTO: 231 10E3/UL (ref 150–450)
RBC # BLD AUTO: 4.91 10E6/UL (ref 4.4–5.9)
WBC # BLD AUTO: 4.8 10E3/UL (ref 4–11)

## 2023-04-06 PROCEDURE — 87389 HIV-1 AG W/HIV-1&-2 AB AG IA: CPT

## 2023-04-06 PROCEDURE — 85025 COMPLETE CBC W/AUTO DIFF WBC: CPT

## 2023-04-06 PROCEDURE — 83036 HEMOGLOBIN GLYCOSYLATED A1C: CPT

## 2023-04-06 PROCEDURE — 85610 PROTHROMBIN TIME: CPT | Performed by: FAMILY MEDICINE

## 2023-04-06 PROCEDURE — 36415 COLL VENOUS BLD VENIPUNCTURE: CPT

## 2023-04-06 PROCEDURE — 82306 VITAMIN D 25 HYDROXY: CPT

## 2023-04-06 PROCEDURE — 86803 HEPATITIS C AB TEST: CPT

## 2023-05-07 DIAGNOSIS — I82.409 RECURRENT DEEP VEIN THROMBOSIS (DVT) (H): ICD-10-CM

## 2023-05-09 NOTE — TELEPHONE ENCOUNTER
Dr. Miranda-Please review, sign if agree and may close encounter.        Last visit: 3/30/23 with Dr. Miranda for annual physical    Thank you!  BRITT HammerN, RN-BC  [facility]

## 2023-07-25 ENCOUNTER — VIRTUAL VISIT (OUTPATIENT)
Dept: URGENT CARE | Facility: CLINIC | Age: 48
End: 2023-07-25
Payer: COMMERCIAL

## 2023-07-25 ENCOUNTER — NURSE TRIAGE (OUTPATIENT)
Dept: FAMILY MEDICINE | Facility: CLINIC | Age: 48
End: 2023-07-25

## 2023-07-25 DIAGNOSIS — Z20.822 SUSPECTED 2019 NOVEL CORONAVIRUS INFECTION: Primary | ICD-10-CM

## 2023-07-25 PROCEDURE — 99213 OFFICE O/P EST LOW 20 MIN: CPT | Mod: VID

## 2023-07-25 NOTE — TELEPHONE ENCOUNTER
Reason for Disposition    [1] HIGH RISK for severe COVID complications (e.g., weak immune system, age > 64 years, obesity with BMI of 30 or higher, pregnant, chronic lung disease or other chronic medical condition) AND [2] COVID symptoms (e.g., cough, fever)  (Exceptions: Already seen by PCP and no new or worsening symptoms.)    Additional Information    Negative: SEVERE difficulty breathing (e.g., struggling for each breath, speaks in single words)    Negative: Difficult to awaken or acting confused (e.g., disoriented, slurred speech)    Negative: Bluish (or gray) lips or face now    Negative: Shock suspected (e.g., cold/pale/clammy skin, too weak to stand, low BP, rapid pulse)    Negative: Sounds like a life-threatening emergency to the triager    Negative: [1] Diagnosed or suspected COVID-19 AND [2] symptoms lasting 3 or more weeks    Negative: [1] COVID-19 exposure AND [2] no symptoms    Negative: COVID-19 vaccine reaction suspected (e.g., fever, headache, muscle aches) occurring 1 to 3 days after getting vaccine    Negative: COVID-19 vaccine, questions about    Negative: [1] Lives with someone known to have influenza (flu test positive) AND [2] flu-like symptoms (e.g., cough, runny nose, sore throat, SOB; with or without fever)    Negative: [1] Adult with possible COVID-19 symptoms AND [2] triager concerned about severity of symptoms or other causes    Negative: COVID-19 and breastfeeding, questions about    Negative: SEVERE or constant chest pain or pressure  (Exception: Mild central chest pain, present only when coughing.)    Negative: MODERATE difficulty breathing (e.g., speaks in phrases, SOB even at rest, pulse 100-120)    Negative: Headache and stiff neck (can't touch chin to chest)    Negative: Oxygen level (e.g., pulse oximetry) 90 percent or lower    Negative: Chest pain or pressure  (Exception: MILD central chest pain, present only when coughing)    Negative: Patient sounds very sick or weak to the  triager    Answer Assessment - Initial Assessment Questions  Has covid  O2 sats 96-97  Some throat congestion  Temp 100.2   Started last night   Tested positive today    Protocols used: CORONAVIRUS (COVID-19) DIAGNOSED OR [id number]

## 2023-07-25 NOTE — TELEPHONE ENCOUNTER
Has covid  O2 sats 96-97  Some throat congestion  Temp 100.2   Started last night   Tested positive today

## 2023-07-25 NOTE — PROGRESS NOTES
Virtual Visit    Assessment/Plan:      ICD-10-CM    1. Suspected 2019 novel coronavirus infection  Z20.822 molnupiravir (LAGEVRIO) 200 MG capsule          COVID-19 positive patient.  Encounter for consideration of medication intervention.    Patient does qualify for a prescription.   Full discussion with patient including medication options, risks and benefits.   Potential drug interactions reviewed with patient.  Molnupiravir prescribed due to current Xarelto need for PE and DVT history.    Red flag symptoms needing urgent evaluation discussed.       Temporary change to home medications:   None    Follow up with primary care provider with any problems, questions or concerns or if symptoms worsen or fail to improve. Patient verbalized understanding and is agreeable to plan.     Patient Instructions   May return to public after 5 days if symptoms improved and no fever, or use of fever reducing medication.    Wear a mask for 5 more days when in the presence of others            Subjective      Cristo  is a 48 year old  who has a confirmed new positive COVID-19 diagnosis.      He has been identified as high risk for complications of this infection, and is being evaluated via a billable     Video visit:     Video-Visit Details    Type of service:  Video Visit    Video Visit Start Time: 1520    Video End Time:     Originating Location (pt. Location): Home    Distant Location (provider location):  Off-site     Platform used for Video Visit: Lavonne    Concern for COVID-19  Exactly how many days ago did these symptoms start? 7/24/23     Are any of the following symptoms significant for you?    new or worsening difficulty breathing? No    worsening cough? No    Fever or chills? Yes, I felt feverish or had chills.    Headache: YES    Sore throat: YES    Chest pain: No    Diarrhea: No    body aches? YES        COVID positive test: 7/25/3          REVIEW OF SYSTEMS  All systems reviewed and negative except per HPI.    Objective   "  Vitals:  No vitals were obtained today due to virtual visit.  Estimated body mass index is 36.68 kg/m  as calculated from the following:    Height as of 3/30/23: 1.765 m (5' 9.5\").    Weight as of 3/30/23: 114.3 kg (252 lb).     GENERAL: Healthy, alert and no distress  EYES: Eyes grossly normal to inspection.  No discharge or erythema, or obvious scleral/conjunctival abnormalities.  HENT: Normal cephalic/atraumatic.  External ears, nose and mouth without ulcers or lesions.  No nasal drainage visible.  NECK: No asymmetry, visible masses or scars  RESP: No audible wheeze, cough, or visible cyanosis.  No visible retractions or increased work of breathing.    SKIN: Visible skin clear. No significant rash, abnormal pigmentation or lesions.  PSYCH: Mentation appears normal, affect normal/bright, judgement and insight intact, normal speech and appearance well-groomed.    Laboratory and Diagnostics    GFR Estimate   Date Value Ref Range Status   03/30/2023 >90 >60 mL/min/1.73m2 Final     Comment:     eGFR calculated using 2021 CKD-EPI equation.   05/19/2020 >90 >60 mL/min/[1.73_m2] Final     Comment:     Non  GFR Calc  Starting 12/18/2018, serum creatinine based estimated GFR (eGFR) will be   calculated using the Chronic Kidney Disease Epidemiology Collaboration   (CKD-EPI) equation.                              "

## 2023-08-01 ENCOUNTER — VIRTUAL VISIT (OUTPATIENT)
Dept: SLEEP MEDICINE | Facility: CLINIC | Age: 48
End: 2023-08-01
Attending: FAMILY MEDICINE
Payer: COMMERCIAL

## 2023-08-01 VITALS — BODY MASS INDEX: 34.58 KG/M2 | HEIGHT: 71 IN | WEIGHT: 247 LBS

## 2023-08-01 DIAGNOSIS — I10 HYPERTENSION, UNSPECIFIED TYPE: ICD-10-CM

## 2023-08-01 DIAGNOSIS — R06.83 SNORING: ICD-10-CM

## 2023-08-01 DIAGNOSIS — G47.34 NOCTURNAL HYPOXEMIA: ICD-10-CM

## 2023-08-01 DIAGNOSIS — G47.33 OSA (OBSTRUCTIVE SLEEP APNEA): Primary | ICD-10-CM

## 2023-08-01 PROCEDURE — 99203 OFFICE O/P NEW LOW 30 MIN: CPT | Mod: VID | Performed by: INTERNAL MEDICINE

## 2023-08-01 ASSESSMENT — SLEEP AND FATIGUE QUESTIONNAIRES
HOW LIKELY ARE YOU TO NOD OFF OR FALL ASLEEP WHILE WATCHING TV: WOULD NEVER DOZE
HOW LIKELY ARE YOU TO NOD OFF OR FALL ASLEEP IN A CAR, WHILE STOPPED FOR A FEW MINUTES IN TRAFFIC: WOULD NEVER DOZE
HOW LIKELY ARE YOU TO NOD OFF OR FALL ASLEEP WHEN YOU ARE A PASSENGER IN A CAR FOR AN HOUR WITHOUT A BREAK: SLIGHT CHANCE OF DOZING
HOW LIKELY ARE YOU TO NOD OFF OR FALL ASLEEP WHILE SITTING AND TALKING TO SOMEONE: WOULD NEVER DOZE
HOW LIKELY ARE YOU TO NOD OFF OR FALL ASLEEP WHILE SITTING QUIETLY AFTER LUNCH WITHOUT ALCOHOL: WOULD NEVER DOZE
HOW LIKELY ARE YOU TO NOD OFF OR FALL ASLEEP WHILE SITTING INACTIVE IN A PUBLIC PLACE: WOULD NEVER DOZE
HOW LIKELY ARE YOU TO NOD OFF OR FALL ASLEEP WHILE LYING DOWN TO REST IN THE AFTERNOON WHEN CIRCUMSTANCES PERMIT: SLIGHT CHANCE OF DOZING
HOW LIKELY ARE YOU TO NOD OFF OR FALL ASLEEP WHILE SITTING AND READING: SLIGHT CHANCE OF DOZING

## 2023-08-01 ASSESSMENT — PAIN SCALES - GENERAL: PAINLEVEL: NO PAIN (0)

## 2023-08-01 NOTE — LETTER
2023         RE: Cristo Dong  [address]  [facility] [address]        Dear Colleague,    Thank you for referring your patient, Cristo Dong, to the [facility] [place]. Please see a copy of my visit note below.    Virtual Visit Details    Type of service:  Video Visit     Originating Location (pt. Location): Home    Distant Location (provider location):  Off-site  Platform used for Video Visit: Lavonne     Chief complaint: Consultation requested by Keesha Miranda MD for evaluation of sleep apnea and new hypertension    LOV Alissa Burns, EMMA CNP 2018    History of Present Illness: 48-year-old gentleman with a history of DVT and PE on chronic anticoagulation.  He was diagnosed with mild sleep apnea about 5 years ago and chose to use an oral appliance.  He has been using an over-the-counter dental appliance which has been helpful.  However during the last 5 years he has had significant weight loss and then weight gain.  He is also noticed an increase in fatigue particularly in the evenings.  He is taking naps about 3 times a week.  He drinks about 2 to 4 cups of coffee in the morning.  There has been some reports of snoring through the oral appliance.  He typically gets up once sometimes twice a night to go to the bathroom.  Typical bedtimes around 9:45 to 10 PM with rise time by 6:30 AM.  He also suffers from chronic edema from his DVTs and wears compression stockings at night.  He occasionally wakes up with morning fogginess.    He has had multiple elevated blood pressure measurements of late and is currently working on lifestyle modification.  That includes reassessment of sleep apnea, working on weight loss and cutting out alcohol.  He has follow-up with his primary care in September.    His mother  in the last few years with a stroke.  She had sleep apnea but was untreated.  His father uses a CPAP and gets excellent clinical benefit.  Patient is considering  switching to CPAP from oral appliance.    He denies problems with insomnia, restless legs, sleepwalking or dream enactment behavior.  He does have sleep talking.      [place] Sleepiness Scale   Sitting and reading: Slight chance of dozing   Watching TV: Would never doze   Sitting, inactive in a public place (e.g. a theatre or a meeting): Would never doze   As a passenger in a car for an hour without a break: Slight chance of dozing   Lying down to rest in the afternoon when circumstances permit: Slight chance of dozing   Sitting and talking to someone: Would never doze   Sitting quietly after a lunch without alcohol: Would never doze   In a car, while stopped for a few minutes in traffic: Would never doze   Total score - [place]: 3   (Less than 10 normal)    Insomnia Severity Scale  MERRY Total Score: 12  Total score categories:  0-7 = No clinically significant insomnia   8-14 = Subthreshold insomnia   15-21 = Clinical insomnia (moderate severity)  22-28 = Clinical insomnia (severe)      Past Medical History:   Diagnosis Date     Other pulmonary embolism and infarction 9/2001    Pt was on coumadin for 5 years     Thrombosed hemorrhoids        Allergies   Allergen Reactions     Morphine And Related GI Disturbance       Current Outpatient Medications   Medication     rivaroxaban ANTICOAGULANT (XARELTO ANTICOAGULANT) 10 MG TABS tablet     No current facility-administered medications for this visit.       Social History     Socioeconomic History     Marital status:      Spouse name: TODD     Number of children: 2     Years of education: Not on file     Highest education level: Not on file   Occupational History     Occupation:      Employer: [employer]   Tobacco Use     Smoking status: Never     Passive exposure: Never     Smokeless tobacco: Never   Vaping Use     Vaping Use: Never used   Substance and Sexual Activity     Alcohol use: Yes     Alcohol/week: 10.0 standard drinks of alcohol     Types: 10  Cans of beer per week     Drug use: No     Sexual activity: Yes     Partners: Female     Birth control/protection: Pill   Other Topics Concern      Service No     Blood Transfusions No     Caffeine Concern Not Asked     Occupational Exposure Not Asked     Hobby Hazards Not Asked     Sleep Concern Not Asked     Stress Concern Not Asked     Weight Concern Not Asked     Special Diet Not Asked     Back Care Not Asked     Exercise No     Bike Helmet Not Asked     Seat Belt Yes     Self-Exams Yes     Parent/sibling w/ CABG, MI or angioplasty before 65F 55M? No   Social History Narrative    2009    Balanced Diet - Yes    Osteoporosis Preventative measures-  Dairy servings per day:2- 3 SERVINGS DAILY    Regular Exercise -  Yes Describe RUN 6-10 MILES WEEKLY AND WEIGHT TRAINING    Dental Exam up - YES - Date: 2008    Eye Exam - YES - Date: 12/07    Self Testicular Exam -  Yes    Do you have any concerns about STD's -  No    Abuse: Current or Past (Physical, Sexual or Emotional)- No    Do you feel safe in your environment - Yes    Guns stored in the home - Yes, LOCKED AWAY    Sunscreen used - Yes    Seatbelts used - Yes    Lipids - YES - Date: 8-27-07    Glucose -  NO    Colon Cancer Screening - No    Hemoccults - YES - Date: UNKNOWN DATE    PSA - NO    Digital Rectal Exam - YES - Date: 2007    Immunizations reviewed and up to date - No, PT UNSURE OF LAST TD    Tala Kaiser MA             Social Determinants of Health     Financial Resource Strain: Not on file   Food Insecurity: Not on file   Transportation Needs: Not on file   Physical Activity: Not on file   Stress: Not on file   Social Connections: Not on file   Intimate Partner Violence: Not on file   Housing Stability: Not on file       Family History   Problem Relation Age of Onset     Sleep Apnea Mother      Diabetes Mother         adult on set late in life likely due to obesity     Cerebrovascular Disease Mother      Blood Disease Father         recurrent  "DVT (twice)     Sleep Apnea Father      Hypertension Father      Neurologic Disorder Maternal Grandmother         Alzheimer's     Diabetes Paternal Grandmother      Gastrointestinal Disease Paternal Grandfather         liver failure     Genitourinary Problems Paternal Grandfather         kidney failure         EXAM:  Ht 1.791 m (5' 10.5\")   Wt 112 kg (247 lb)   BMI 34.94 kg/m    GENERAL: Healthy, alert and no distress  EYES: Eyes grossly normal to inspection.  No discharge or erythema, or obvious scleral/conjunctival abnormalities.  RESP: No audible wheeze, cough, or visible cyanosis.  No visible retractions or increased work of breathing.    SKIN: Visible skin clear. No significant rash, abnormal pigmentation or lesions.  NEURO: Cranial nerves grossly intact.  Mentation and speech appropriate for age.  PSYCH: Mentation appears normal, affect normal/bright, judgement and insight intact, normal speech and appearance well-groomed.       HSAT 10/29/2018  Weight 250 lbs  AHI 8.9, Lowest O2 sat 83%  Time with saturation less than or equal to 88% was 15  minutes.    TSH   Date Value Ref Range Status   06/30/2015 1.30 0.40 - 4.00 mU/L Final           ASSESSMENT:  48-year-old gentleman on chronic anticoagulation for recurrent DVT/PE with overall mild sleep apnea with hypoxemia currently treated with oral appliance (unclear if optimally treated).  Given progression of some symptoms as well as multiple elevated blood pressure remains consistent with hypertension and patient like to consider alternative therapy if indicated.    PLAN:  Recommended reevaluation of underlying sleep apnea with home sleep apnea testing.  If it remains mild consider professionally made oral appliance versus CPAP.  If moderate to severe consider CPAP.  In the meantime, patient is going to continue his efforts at weight management and cutting out alcohol.  I will be contacting him with results of the home sleep apnea test.  He is going to stop using " the oral appliance for a couple of nights before the home sleep apnea test.  See instructions for further details of counseling provided.  He is agreeable with this plan.    36 minutes spent by me on the date of the encounter doing chart review, history and exam, documentation and further activities per the note    Valerie Jc M.D.  Pulmonary/Critical Care/Sleep Medicine    [facility]   Floor 1, [address]   [address] [place] [address]   Appointments: [phone]    The above note was dictated using voice recognition software and may include typographical errors. Please contact the author for any clarifications.                Again, thank you for allowing me to participate in the care of your patient.        Sincerely,        Valerie Jc MD

## 2023-08-01 NOTE — PROGRESS NOTES
Virtual Visit Details    Type of service:  Video Visit     Originating Location (pt. Location): Home    Distant Location (provider location):  Off-site  Platform used for Video Visit: Lavonne     Chief complaint: Consultation requested by Keesha Miranda MD for evaluation of sleep apnea and new hypertension    LOV EMMA Frazier CNP 2018    History of Present Illness: 48-year-old gentleman with a history of DVT and PE on chronic anticoagulation.  He was diagnosed with mild sleep apnea about 5 years ago and chose to use an oral appliance.  He has been using an over-the-counter dental appliance which has been helpful.  However during the last 5 years he has had significant weight loss and then weight gain.  He is also noticed an increase in fatigue particularly in the evenings.  He is taking naps about 3 times a week.  He drinks about 2 to 4 cups of coffee in the morning.  There has been some reports of snoring through the oral appliance.  He typically gets up once sometimes twice a night to go to the bathroom.  Typical bedtimes around 9:45 to 10 PM with rise time by 6:30 AM.  He also suffers from chronic edema from his DVTs and wears compression stockings at night.  He occasionally wakes up with morning fogginess.    He has had multiple elevated blood pressure measurements of late and is currently working on lifestyle modification.  That includes reassessment of sleep apnea, working on weight loss and cutting out alcohol.  He has follow-up with his primary care in September.    His mother  in the last few years with a stroke.  She had sleep apnea but was untreated.  His father uses a CPAP and gets excellent clinical benefit.  Patient is considering switching to CPAP from oral appliance.    He denies problems with insomnia, restless legs, sleepwalking or dream enactment behavior.  He does have sleep talking.      [place] Sleepiness Scale   Sitting and reading: Slight chance of dozing   Watching TV: Would  never doze   Sitting, inactive in a public place (e.g. a theatre or a meeting): Would never doze   As a passenger in a car for an hour without a break: Slight chance of dozing   Lying down to rest in the afternoon when circumstances permit: Slight chance of dozing   Sitting and talking to someone: Would never doze   Sitting quietly after a lunch without alcohol: Would never doze   In a car, while stopped for a few minutes in traffic: Would never doze   Total score - [place]: 3   (Less than 10 normal)    Insomnia Severity Scale  MERRY Total Score: 12  Total score categories:  0-7 = No clinically significant insomnia   8-14 = Subthreshold insomnia   15-21 = Clinical insomnia (moderate severity)  22-28 = Clinical insomnia (severe)      Past Medical History:   Diagnosis Date     Other pulmonary embolism and infarction 9/2001    Pt was on coumadin for 5 years     Thrombosed hemorrhoids        Allergies   Allergen Reactions     Morphine And Related GI Disturbance       Current Outpatient Medications   Medication     rivaroxaban ANTICOAGULANT (XARELTO ANTICOAGULANT) 10 MG TABS tablet     No current facility-administered medications for this visit.       Social History     Socioeconomic History     Marital status:      Spouse name: TODD     Number of children: 2     Years of education: Not on file     Highest education level: Not on file   Occupational History     Occupation:      Employer: [employer]   Tobacco Use     Smoking status: Never     Passive exposure: Never     Smokeless tobacco: Never   Vaping Use     Vaping Use: Never used   Substance and Sexual Activity     Alcohol use: Yes     Alcohol/week: 10.0 standard drinks of alcohol     Types: 10 Cans of beer per week     Drug use: No     Sexual activity: Yes     Partners: Female     Birth control/protection: Pill   Other Topics Concern      Service No     Blood Transfusions No     Caffeine Concern Not Asked     Occupational Exposure Not Asked      Hobby Hazards Not Asked     Sleep Concern Not Asked     Stress Concern Not Asked     Weight Concern Not Asked     Special Diet Not Asked     Back Care Not Asked     Exercise No     Bike Helmet Not Asked     Seat Belt Yes     Self-Exams Yes     Parent/sibling w/ CABG, MI or angioplasty before 65F 55M? No   Social History Narrative    2009    Balanced Diet - Yes    Osteoporosis Preventative measures-  Dairy servings per day:2- 3 SERVINGS DAILY    Regular Exercise -  Yes Describe RUN 6-10 MILES WEEKLY AND WEIGHT TRAINING    Dental Exam up - YES - Date: 2008    Eye Exam - YES - Date: 12/07    Self Testicular Exam -  Yes    Do you have any concerns about STD's -  No    Abuse: Current or Past (Physical, Sexual or Emotional)- No    Do you feel safe in your environment - Yes    Guns stored in the home - Yes, LOCKED AWAY    Sunscreen used - Yes    Seatbelts used - Yes    Lipids - YES - Date: 8-27-07    Glucose -  NO    Colon Cancer Screening - No    Hemoccults - YES - Date: UNKNOWN DATE    PSA - NO    Digital Rectal Exam - YES - Date: 2007    Immunizations reviewed and up to date - No, PT UNSURE OF LAST TD    Tala Kaiser MA             Social Determinants of Health     Financial Resource Strain: Not on file   Food Insecurity: Not on file   Transportation Needs: Not on file   Physical Activity: Not on file   Stress: Not on file   Social Connections: Not on file   Intimate Partner Violence: Not on file   Housing Stability: Not on file       Family History   Problem Relation Age of Onset     Sleep Apnea Mother      Diabetes Mother         adult on set late in life likely due to obesity     Cerebrovascular Disease Mother      Blood Disease Father         recurrent DVT (twice)     Sleep Apnea Father      Hypertension Father      Neurologic Disorder Maternal Grandmother         Alzheimer's     Diabetes Paternal Grandmother      Gastrointestinal Disease Paternal Grandfather         liver failure     Genitourinary Problems  "Paternal Grandfather         kidney failure         EXAM:  Ht 1.791 m (5' 10.5\")   Wt 112 kg (247 lb)   BMI 34.94 kg/m    GENERAL: Healthy, alert and no distress  EYES: Eyes grossly normal to inspection.  No discharge or erythema, or obvious scleral/conjunctival abnormalities.  RESP: No audible wheeze, cough, or visible cyanosis.  No visible retractions or increased work of breathing.    SKIN: Visible skin clear. No significant rash, abnormal pigmentation or lesions.  NEURO: Cranial nerves grossly intact.  Mentation and speech appropriate for age.  PSYCH: Mentation appears normal, affect normal/bright, judgement and insight intact, normal speech and appearance well-groomed.       HSAT 10/29/2018  Weight 250 lbs  AHI 8.9, Lowest O2 sat 83%  Time with saturation less than or equal to 88% was 15  minutes.    TSH   Date Value Ref Range Status   06/30/2015 1.30 0.40 - 4.00 mU/L Final           ASSESSMENT:  48-year-old gentleman on chronic anticoagulation for recurrent DVT/PE with overall mild sleep apnea with hypoxemia currently treated with oral appliance (unclear if optimally treated).  Given progression of some symptoms as well as multiple elevated blood pressure remains consistent with hypertension and patient like to consider alternative therapy if indicated.    PLAN:  Recommended reevaluation of underlying sleep apnea with home sleep apnea testing.  If it remains mild consider professionally made oral appliance versus CPAP.  If moderate to severe consider CPAP.  In the meantime, patient is going to continue his efforts at weight management and cutting out alcohol.  I will be contacting him with results of the home sleep apnea test.  He is going to stop using the oral appliance for a couple of nights before the home sleep apnea test.  See instructions for further details of counseling provided.  He is agreeable with this plan.    36 minutes spent by me on the date of the encounter doing chart review, history and " exam, documentation and further activities per the note    Valerie Jc M.D.  Pulmonary/Critical Care/Sleep Medicine    [facility]   Floor 1, [address]   [address] [place] [address]   Appointments: [phone]    The above note was dictated using voice recognition software and may include typographical errors. Please contact the author for any clarifications.

## 2023-08-01 NOTE — NURSING NOTE
Is the patient currently in the state of [place]? YES    Visit mode:VIDEO    If the visit is dropped, the patient can be reconnected by: VIDEO VISIT: Text to cell phone: [phone]    Will anyone else be joining the visit? NO    How would you like to obtain your AVS? MyChart    Are changes needed to the allergy or medication list? NO    Reason for visit: Consult    Has patient had flu shot for current/most recent flu season? If so, when? Yes: 9/9/2022    ONEYDA Lowe/LAN

## 2023-08-01 NOTE — PATIENT INSTRUCTIONS
"Stop using the oral appliance a couple of days before the home sleep apnea test-do not use the night of test-okay to resume afterwards.          MY TREATMENT INFORMATION FOR SLEEP APNEA-  Cristo Dong    DOCTOR : Valerie Jc MD  Am I having a home sleep study?  --->Watch the video for the device you are using:    -/drop off device-   https://www.[employer].com/watch?v=yGGFBdELGhk    Frequently asked questions:  1. What is Obstructive Sleep Apnea (REJI)? REJI is the most common type of sleep apnea. Apnea means, \"without breath.\"  Apnea is most often caused by narrowing or collapse of the upper airway as muscles relax during sleep.   Almost everyone has occasional apneas. Most people with sleep apnea have had brief interruptions at night frequently for many years.  The severity of sleep apnea is related to how frequent and severe the events are.   2. What are the consequences of REJI? Symptoms include: feeling sleepy during the day, snoring loudly, gasping or stopping of breathing, trouble sleeping, and occasionally morning headaches or heartburn at night.  Sleepiness can be serious and even increase the risk of falling asleep while driving. Other health consequences may include development of high blood pressure and other cardiovascular disease in persons who are susceptible. Untreated REJI  can contribute to heart disease, stroke and diabetes.   3. What are the treatment options? In most situations, sleep apnea is a lifelong disease that must be managed with daily therapy. Medications are not effective for sleep apnea and surgery is generally not considered until other therapies have been tried. Your treatment is your choice . Continuous Positive Airway (CPAP) works right away and is the therapy that is effective in nearly everyone. An oral device to hold your jaw forward is usually the next most reliable option. Other options include postioning devices (to keep you off your back), weight loss, and " surgery including a tongue pacing device. There is more detail about some of these options below.  4. Are my sleep studies covered by insurance? Although we will request verification of coverage, we advise you also check in advance of the study to ensure there is coverage.    Important tips for those choosing CPAP and similar devices  For new devices, sign up for device GALILEO to monitor your device for your followup visits  We encourage you to utilize the [employer] galileo or website (myAir web (resmed.com) ) to monitor your therapy progress and share the data with your healthcare team when you discuss your sleep apnea.                                                    Know your equipment:  CPAP is continuous positive airway pressure that prevents obstructive sleep apnea by keeping the throat from collapsing while you are sleeping. In most cases, the device is  smart  and can slowly self-adjusts if your throat collapses and keeps a record every day of how well you are treated-this information is available to you and your care team.  BPAP is bilevel positive airway pressure that keeps your throat open and also assists each breath with a pressure boost to maintain adequate breathing.  Special kinds of BPAP are used in patients who have inadequate breathing from lung or heart disease. In most cases, the device is  smart  and can slowly self-adjusts to assist breathing. Like CPAP, the device keeps a record of how well you are treated.  Your mask is your connection to the device. You get to choose what feels most comfortable and the staff will help to make sure if fits. Here: are some examples of the different masks that are available:       Key points to remember on your journey with sleep apnea:  Sleep study.  PAP devices often need to be adjusted during a sleep study to show that they are effective and adjusted right.  Good tips to remember: Try wearing just the mask during a quiet time during the day so your body  adapts to wearing it. A humidifier is recommended for comfort in most cases to prevent drying of your nose and throat. Allergy medication from your provider may help you if you are having nasal congestion.  Getting settled-in. It takes more than one night for most of us to get used to wearing a mask. Try wearing just the mask during a quiet time during the day so your body adapts to wearing it. A humidifier is recommended for comfort in most cases. Our team will work with you carefully on the first day and will be in contact within 4 days and again at 2 and 4 weeks for advice and remote device adjustments. Your therapy is evaluated by the device each day.   Use it every night. The more you are able to sleep naturally for 7-8 hours, the more likely you will have good sleep and to prevent health risks or symptoms from sleep apnea. Even if you use it 4 hours it helps. Occasionally all of us are unable to use a medical therapy, in sleep apnea, it is not dangerous to miss one night.   Communicate. Call our skilled team on the number provided on the first day if your visit for problems that make it difficult to wear the device. Over 2 out of 3 patients can learn to wear the device long-term with help from our team. Remember to call our team or your sleep providers if you are unable to wear the device as we may have other solutions for those who cannot adapt to mask CPAP therapy. It is recommended that you sleep your sleep provider within the first 3 months and yearly after that if you are not having problems.   Use it for your health. We encourage use of CPAP masks during daytime quiet periods to allow your face and brain to adapt to the sensation of CPAP so that it will be a more natural sensation to awaken to at night or during naps. This can be very useful during the first few weeks or months of adapting to CPAP though it does not help medically to wear CPAP during wakefulness and  should not be used as a strategy just  to meet guidelines.  Take care of your equipment. Make sure you clean your mask and tubing using directions every day and that your filter and mask are replaced as recommended or if they are not working.     BESIDES CPAP, WHAT OTHER THERAPIES ARE THERE?    Positioning Device  Positioning devices are generally used when sleep apnea is mild and only occurs on your back.This example shows a pillow that straps around the waist. It may be appropriate for those whose sleep study shows milder sleep apnea that occurs primarily when lying flat on one's back. Preliminary studies have shown benefit but effectiveness at home may need to be verified by a home sleep test. These devices are generally not covered by medical insurance.  Examples of devices that maintain sleeping on the back to prevent snoring and mild sleep apnea.    Belt type body positioner  http://[employer]/    Electronic reminder  [email or web address].com/            Oral Appliance  What is oral appliance therapy?  An oral appliance device fits on your teeth at night like a retainer used after having braces. The device is made by a specialized dentist and requires several visits over 1-2 months before a manufactured device is made to fit your teeth and is adjusted to prevent your sleep apnea. Once an oral device is working properly, snoring should be improved. A home sleep test may be recommended at that time if to determine whether the sleep apnea is adequately treated.       Some things to remember:  -Oral devices are often, but not always, covered by your medical insurance. Be sure to check with your insurance provider.   -If you are referred for oral therapy, you will be given a list of specialized dentists to consider or you may choose to visit the Web site of the American Academy of Dental Sleep Medicine  -Oral devices are less likely to work if you have severe sleep apnea or are extremely overweight.     More detailed information  An oral appliance  is a small acrylic device that fits over the upper and lower teeth  (similar to a retainer or a mouth guard). This device slightly moves jaw forward, which moves the base of the tongue forward, opens the airway, improves breathing for effective treat snoring and obstructive sleep apnea in perhaps 7 out of 10 people .  The best working devices are custom-made by a dental device  after a mold is made of the teeth 1, 2, 3.  When is an oral appliance indicated?  Oral appliance therapy is recommended as a first-line treatment for patients with primary snoring, mild sleep apnea, and for patients with moderate sleep apnea who prefer appliance therapy to use of CPAP4, 5. Severity of sleep apnea is determined by sleep testing and is based on the number of respiratory events per hour of sleep.   How successful is oral appliance therapy?  The success rate of oral appliance therapy in patients with mild sleep apnea is 75-80% while in patients with moderate sleep apnea it is 50-70%. The chance of success in patients with severe sleep apnea is 40-50%. The research also shows that oral appliances have a beneficial effect on the cardiovascular health of REJI patients at the same magnitude as CPAP therapy7.  Oral appliances should be a second-line treatment in cases of severe sleep apnea, but if not completely successful then a combination therapy utilizing CPAP plus oral appliance therapy may be effective. Oral appliances tend to be effective in a broad range of patients although studies show that the patients who have the highest success are females, younger patients, those with milder disease, and less severe obesity. 3, 6.   Finding a dentist that practices dental sleep medicine  Specific training is available through the American Academy of Dental Sleep Medicine for dentists interested in working in the field of sleep. To find a dentist who is educated in the field of sleep and the use of oral appliances, near you,  visit the Web site of the American Academy of Dental Sleep Medicine.    References  1. Stephanie, et al. Objectively measured vs self-reported compliance during oral appliance therapy for sleep-disordered breathing. Chest 2013; 144(5): 9055-0579.  2. Siobhan et al. Objective measurement of compliance during oral appliance therapy for sleep-disordered breathing. Thorax 2013; 68(1): 91-96.  3. Avni, et al. Mandibular advancement devices in 620 men and women with REJI and snoring: tolerability and predictors of treatment success. Chest 2004; 125: 5351-1512.  4. Collins et al. Oral appliances for snoring and REJI: a review. Sleep 2006; 29: 244-262.  5. Karla et al. Oral appliance treatment for REJI: an update. J Clin Sleep Med 2014; 10(2): 215-227.  6. Elpidio et al. Predictors of OSAH treatment outcome. J Dent Res 2007; 86: 2778-1124.      Weight Loss:    Weight loss is a long-term strategy that may improve sleep apnea in some patients.    Weight management is a personal decision and the decision should be based on your interest and the potential benefits.  If you are interested in exploring weight loss strategies, the following discussion covers the impact on weight loss on sleep apnea and the approaches that may be successful.    Being overweight does not necessarily mean you will have health consequences.  Those who have BMI over 35 or over 27 with existing medical conditions carries greater risk.   Weight loss decreases severity of sleep apnea in most people with obesity. For those with mild obesity who have developed snoring with weight gain, even 15-30 pound weight loss can improve and occasionally eliminate sleep apnea.  Structured and life-long dietary and health habits are necessary to lose weight and keep healthier weight levels.     Though there may be significant health benefits from weight loss, long-term weight loss is very difficult to achieve- studies show success with dietary management  in less than 10% of people. In addition, substantial weight loss may require years of dietary control and may be difficult if patients have severe obesity. In these cases, surgical management may be considered.  Finally, older individuals who have tolerated obesity without health complications may be less likely to benefit from weight loss strategies.        Surgery:    Surgery for obstructive sleep apnea is considered generally only when other therapies fail to work. Surgery may be discussed with you if you are having a difficult time tolerating CPAP and or when there is an abnormal structure that requires surgical correction.  Nose and throat surgeries often enlarge the airway to prevent collapse.  Most of these surgeries create pain for 1-2 weeks and up to half of the most common surgeries are not effective throughout life.  You should carefully discuss the benefits and drawbacks to surgery with your sleep provider and surgeon to determine if it is the best solution for you.   More information  Surgery for REJI is directed at areas that are responsible for narrowing or complete obstruction of the airway during sleep.  There are a wide range of procedures available to enlarge and/or stabilize the airway to prevent blockage of breathing in the three major areas where it can occur: the palate, tongue, and nasal regions.  Successful surgical treatment depends on the accurate identification of the factors responsible for obstructive sleep apnea in each person.  A personalized approach is required because there is no single treatment that works well for everyone.  Because of anatomic variation, consultation with an examination by a sleep surgeon is a critical first step in determining what surgical options are best for each patient.  In some cases, examination during sedation may be recommended in order to guide the selection of procedures.  Patients will be counseled about risks and benefits as well as the typical  recovery course after surgery. Surgery is typically not a cure for a person s REJI.  However, surgery will often significantly improve one s REJI severity (termed  success rate ).  Even in the absence of a cure, surgery will decrease the cardiovascular risk associated with OSA7; improve overall quality of life8 (sleepiness, functionality, sleep quality, etc).      Palate Procedures:  Patients with REJI often have narrowing of their airway in the region of their tonsils and uvula.  The goals of palate procedures are to widen the airway in this region as well as to help the tissues resist collapse.  Modern palate procedure techniques focus on tissue conservation and soft tissue rearrangement, rather than tissue removal.  Often the uvula is preserved in this procedure. Residual sleep apnea is common in patient after pharyngoplasty with an average reduction in sleep apnea events of 33%2.      Tongue Procedures:  ExamWhile patients are awake, the muscles that surround the throat are active and keep this region open for breathing. These muscles relax during sleep, allowing the tongue and other structures to collapse and block breathing.  There are several different tongue procedures available.  Selection of a tongue base procedure depends on characteristics seen on physical exam.  Generally, procedures are aimed at removing bulky tissues in this area or preventing the back of the tongue from falling back during sleep.  Success rates for tongue surgery range from 50-62%3.    Hypoglossal Nerve Stimulation:  Hypoglossal nerve stimulation has recently received approval from the [place] Food and Drug Administration for the treatment of obstructive sleep apnea.  This is based on research showing that the system was safe and effective in treating sleep apnea6.  Results showed that the median AHI score decreased 68%, from 29.3 to 9.0. This therapy uses an implant system that senses breathing patterns and delivers mild  stimulation to airway muscles, which keeps the airway open during sleep.  The system consists of three fully implanted components: a small generator (similar in size to a pacemaker), a breathing sensor, and a stimulation lead.  Using a small handheld remote, a patient turns the therapy on before bed and off upon awakening.    Candidates for this device must be greater than 18 years of age, have moderate to severe REJI (AHI between 15-65), BMI less than 35, have tried CPAP/oral appliance for at least 8 weeks without success, and have appropriate upper airway anatomy (determined by a sleep endoscopy performed by Dr. Ricco Huff).    Hypoglossal Nerve Stimulation Pathway:    The sleep surgeon s office will work with the patient through the insurance prior-authorization process (including communications and appeals).    Nasal Procedures:  Nasal obstruction can interfere with nasal breathing during the day and night.  Studies have shown that relief of nasal obstruction can improve the ability of some patients to tolerate positive airway pressure therapy for obstructive sleep apnea1.  Treatment options include medications such as nasal saline, topical corticosteroid and antihistamine sprays, and oral medications such as antihistamines or decongestants. Non-surgical treatments can include external nasal dilators for selected patients. If these are not successful by themselves, surgery can improve the nasal airway either alone or in combination with these other options.      Combination Procedures:  Combination of surgical procedures and other treatments may be recommended, particularly if patients have more than one area of narrowing or persistent positional disease.  The success rate of combination surgery ranges from 66-80%2,3.    References  Becca CASTRO. The Role of the Nose in Snoring and Obstructive Sleep Apnoea: An Update.  Eur Arch Otorhinolaryngol. 2011; 268: 1365-73.   Mandy SM; Chandler MONTOYA; Aristeo BURNETTE; Pallanch JF;  Cayla MB; Wes SG; Naseem REYNA. Surgical modifications of the upper airway for obstructive sleep apnea in adults: a systematic review and meta-analysis. SLEEP 2010;33(10):9523-4036. Adrianna HARRY. Hypopharyngeal surgery in obstructive sleep apnea: an evidence-based medicine review.  Arch Otolaryngol Head Neck Surg. 2006 Feb;132(2):206-13.  Brock YH1, Virgilio Y, Newton AAMIR. The efficacy of anatomically based multilevel surgery for obstructive sleep apnea. Otolaryngol Head Neck Surg. 2003 Oct;129(4):327-35.  Adrianna HARRY, Goldberg A. Hypopharyngeal Surgery in Obstructive Sleep Apnea: An Evidence-Based Medicine Review. Arch Otolaryngol Head Neck Surg. 2006 Feb;132(2):206-13.  Jude ANDERSON et al. Upper-Airway Stimulation for Obstructive Sleep Apnea.  N Engl J Med. 2014 Jan 9;370(2):139-49.  Danuta Y et al. Increased Incidence of Cardiovascular Disease in Middle-aged Men with Obstructive Sleep Apnea. Am J Respir Crit Care Med; 2002 166: 159-165  Armas EM et al. Studying Life Effects and Effectiveness of Palatopharyngoplasty (SLEEP) study: Subjective Outcomes of Isolated Uvulopalatopharyngoplasty. Otolaryngol Head Neck Surg. 2011; 144: 623-631.        WHAT IF I ONLY HAVE SNORING?    Mandibular advancement devices, lateral sleep positioning, long-term weight loss and treatment of nasal allergies have been shown to improve snoring.  Exercising tongue muscles with a game (https://apps.Contact[employer]/us/galileo/soundly-reduce-snoring/ax6650613356) or stimulating the tongue during the day with a device (https://doi.org/10.3390/pst57720940) have improved snoring in some individuals.    Remember to Drive Safe... Drive Alive     Sleep health profoundly affects your health, mood, and your safety.  Thirty three percent of the population (one in three of us) is not getting enough sleep and many have a sleep disorder. Not getting enough sleep or having an untreated / undertreated sleep condition may make us sleepy without even knowing it. In fact, our  driving could be dramatically impaired due to our sleep health. As your provider, here are some things I would like you to know about driving:     Here are some warning signs for impairment and dangerous drowsy driving:              -Having been awake more than 16 hours               -Looking tired               -Eyelid drooping              -Head nodding (it could be too late at this point)              -Driving for more than 30 minutes     Some things you could do to make the driving safer if you are experiencing some drowsiness:              -Stop driving and rest              -Call for transportation              -Make sure your sleep disorder is adequately treated     Some things that have been shown NOT to work when experiencing drowsiness while driving:              -Turning on the radio              -Opening windows              -Eating any  distracting  /  entertaining  foods (e.g., sunflower seeds, candy, or any other)              -Talking on the phone      Your decision may not only impact your life, but also the life of others. Please, remember to drive safe for yourself and all of us.

## 2023-09-12 ENCOUNTER — OFFICE VISIT (OUTPATIENT)
Dept: SLEEP MEDICINE | Facility: CLINIC | Age: 48
End: 2023-09-12
Attending: INTERNAL MEDICINE
Payer: COMMERCIAL

## 2023-09-12 DIAGNOSIS — G47.34 NOCTURNAL HYPOXEMIA: ICD-10-CM

## 2023-09-12 DIAGNOSIS — G47.33 OSA (OBSTRUCTIVE SLEEP APNEA): ICD-10-CM

## 2023-09-12 DIAGNOSIS — R06.83 SNORING: ICD-10-CM

## 2023-09-12 DIAGNOSIS — I10 HYPERTENSION, UNSPECIFIED TYPE: ICD-10-CM

## 2023-09-12 PROCEDURE — G0399 HOME SLEEP TEST/TYPE 3 PORTA: HCPCS | Performed by: INTERNAL MEDICINE

## 2023-09-12 ASSESSMENT — SLEEP AND FATIGUE QUESTIONNAIRES
HOW LIKELY ARE YOU TO NOD OFF OR FALL ASLEEP WHILE SITTING AND READING: WOULD NEVER DOZE
HOW LIKELY ARE YOU TO NOD OFF OR FALL ASLEEP WHILE SITTING QUIETLY AFTER LUNCH WITHOUT ALCOHOL: WOULD NEVER DOZE
HOW LIKELY ARE YOU TO NOD OFF OR FALL ASLEEP WHILE LYING DOWN TO REST IN THE AFTERNOON WHEN CIRCUMSTANCES PERMIT: MODERATE CHANCE OF DOZING
HOW LIKELY ARE YOU TO NOD OFF OR FALL ASLEEP WHILE WATCHING TV: SLIGHT CHANCE OF DOZING
HOW LIKELY ARE YOU TO NOD OFF OR FALL ASLEEP WHILE SITTING AND TALKING TO SOMEONE: WOULD NEVER DOZE
HOW LIKELY ARE YOU TO NOD OFF OR FALL ASLEEP IN A CAR, WHILE STOPPED FOR A FEW MINUTES IN TRAFFIC: WOULD NEVER DOZE
HOW LIKELY ARE YOU TO NOD OFF OR FALL ASLEEP WHEN YOU ARE A PASSENGER IN A CAR FOR AN HOUR WITHOUT A BREAK: WOULD NEVER DOZE
HOW LIKELY ARE YOU TO NOD OFF OR FALL ASLEEP WHILE SITTING INACTIVE IN A PUBLIC PLACE: WOULD NEVER DOZE

## 2023-09-12 NOTE — PROGRESS NOTES
Pt is completing a home sleep test. Pt was instructed on how to put on the Noxturnal T3 device and associated equipment before going to bed and given the opportunity to practice putting it on before leaving the sleep center. Pt was reminded to bring the home sleep test kit back to the center tomorrow, at agreed upon time for download and reporting.   Neck circumference: 43 CM / 17.5 inches.  Radha Corley CMA, HST Specialist  [place] [facility]

## 2023-09-13 ENCOUNTER — DOCUMENTATION ONLY (OUTPATIENT)
Dept: SLEEP MEDICINE | Facility: CLINIC | Age: 48
End: 2023-09-13
Attending: INTERNAL MEDICINE
Payer: COMMERCIAL

## 2023-09-19 PROBLEM — G47.33 OSA (OBSTRUCTIVE SLEEP APNEA): Status: ACTIVE | Noted: 2018-11-12

## 2023-09-19 NOTE — PROCEDURES
"HOME SLEEP STUDY INTERPRETATION        Patient: Cristo Dong  MRN: [id number]  YOB: 1975  Study Date: 9/12/2023  PCP/Referring Provider: Keesha Miranda MD  Ordering Provider:   Valerie Jc MD         Indications for Home Study: Cristo Dong is a 48 year old male with a history of DVT, PE, mild sleep apnea, elevated blood pressure measurements who presents with symptoms suggestive of obstructive sleep apnea.    Estimated body mass index is 34.94 kg/m  as calculated from the following:    Height as of 8/1/23: 1.791 m (5' 10.5\").    Weight as of 8/1/23: 112 kg (247 lb).  Total score - [place]: 3 (9/12/2023  7:12 AM)  Total Score: 4 (9/12/2023  7:13 AM)        Data: A full night home sleep study was performed recording the standard physiologic parameters including body position, movement, sound, nasal pressure, thermal oral airflow, chest and abdominal movements with respiratory inductance plethysmography, and oxygen saturation by pulse oximetry. Pulse rate was estimated by oximetry recording. This study was considered adequate based on > 4 hours of quality oximetry and respiratory recording. As specified by the AASM Manual for the Scoring of Sleep and Associated events, version 2.3, Rule VIII.D 1B, 4% oxygen desaturation scoring for hypopneas is used as a standard of care on all home sleep apnea testing.        Analysis Time:  489 minutes        Respiration:   Sleep Associated Hypoxemia: sustained hypoxemia was present. Baseline oxygen saturation was 95%.  Time with saturation less than or equal to 88% was 10.7 minutes. The lowest oxygen saturation was 80%.   Snoring: Snoring was present.  Respiratory events: The home study revealed a presence of 28 obstructive apneas and 3 mixed and central apneas. There were 66 hypopneas resulting in a combined apnea/hypopnea index [AHI] of 11.9 events per hour.  AHI was 26.8 per hour supine, 2.2 per hour prone, 15.8 per hour on left side, and 14.8 per " hour on right side.   Pattern: Excluding events noted above, respiratory rate and pattern was Normal.      Position: Percent of time spent: supine - 9%, prone - 33%, on left - 28%, on right - 30%.      Heart Rate: By pulse oximetry normal rate was noted.       Assessment:   Mild obstructive sleep apnea.  Sleep associated hypoxemia was present.    Recommendations:  Consider auto-CPAP at 5-15 cmH2O or oral appliance therapy.  Suggest optimizing sleep hygiene and avoiding sleep deprivation.  Weight management.        Diagnosis Code(s): Obstructive Sleep Apnea G47.33, Hypoxemia G47.36, Snoring R06.83    Valerie Jc MD, September 19, 2023   Diplomate, American Board of Internal Medicine, Sleep Medicine

## 2023-09-19 NOTE — PROGRESS NOTES
This HSAT was performed using a Noxturnal T3 device which recorded snore, sound, movement activity, body position, nasal pressure, oronasal thermal airflow, pulse, oximetry and both chest and abdominal respiratory effort. HSAT data was restricted to the time patient states they were in bed.     HSAT was scored using 1B 4% hypopnea rule.     HST AHI (Non-PAT): 11.9  Snoring was reported as mild.  Time with SpO2 below 89% was 10.7 minutes.   Overall signal quality was good.     Pt will follow up with sleep provider to determine appropriate therapy.

## 2023-09-19 NOTE — PROGRESS NOTES
HST POST-STUDY QUESTIONNAIRE    What time did you go to bed?  9:45  How long do you think it took to fall asleep?  20-30 min  What time did you wake up to start the day?  6:15  Did you get up during the night at all?  yes  If you woke up, do you remember approximately what time(s)? 1:30, I think  Did you have any difficulty with the equipment?  No  Did you us any type of treatment with this study?  None  Was the head of the bed elevated? No  Did you sleep in a recliner?  No  Did you stop using CPAP at least 3 days before this test?  NA  Any other information you'd like us to know? After 1:30 I did not sleep that well. A typical nights sleep.     Received a refill request for patient's lovastatin to [facility]. It is noted that patient has an up coming appointment with provider on 03/24/2020. Last office visit for this was 02/22/2019. Prescription was last written on 02/19/2019. Refill is sent until patient can be seen in office.

## 2023-10-13 ENCOUNTER — E-VISIT (OUTPATIENT)
Dept: URGENT CARE | Facility: CLINIC | Age: 48
End: 2023-10-13

## 2023-10-13 ENCOUNTER — MYC MEDICAL ADVICE (OUTPATIENT)
Dept: FAMILY MEDICINE | Facility: CLINIC | Age: 48
End: 2023-10-13
Payer: COMMERCIAL

## 2023-10-13 DIAGNOSIS — J01.90 ACUTE SINUSITIS, RECURRENCE NOT SPECIFIED, UNSPECIFIED LOCATION: Primary | ICD-10-CM

## 2023-10-13 PROCEDURE — 99421 OL DIG E/M SVC 5-10 MIN: CPT | Performed by: PREVENTIVE MEDICINE

## 2023-10-13 NOTE — PATIENT INSTRUCTIONS
Acute Sinusitis: Care Instructions  Overview     Acute sinusitis is an inflammation of the mucous membranes inside the nose and sinuses. Sinuses are the hollow spaces in your skull around the eyes and nose. Acute sinusitis often follows a cold. Acute sinusitis causes thick, discolored mucus that drains from the nose or down the back of the throat. It also can cause pain and pressure in your head and face along with a stuffy or blocked nose.  In most cases, sinusitis gets better on its own in 1 to 2 weeks. But some mild symptoms may last for several weeks. Sometimes antibiotics are needed if there is a bacterial infection.  Follow-up care is a key part of your treatment and safety. Be sure to make and go to all appointments, and call your doctor if you are having problems. It's also a good idea to know your test results and keep a list of the medicines you take.  How can you care for yourself at home?  Use saline (saltwater) nasal washes. This can help keep your nasal passages open and wash out mucus and allergens.  You can buy saline nose washes at a grocery store or drugstore. Follow the instructions on the package.  You can make your own at home. Add 1 teaspoon of non-iodized salt and 1 teaspoon of baking soda to 2 cups of distilled or boiled and cooled water. Fill a squeeze bottle or a nasal cleansing pot (such as a neti pot) with the nasal wash. Then put the tip into your nostril, and lean over the sink. With your mouth open, gently squirt the liquid. Repeat on the other side.  Try a decongestant nasal spray like oxymetazoline (Afrin). Do not use it for more than 3 days in a row. Using it for more than 3 days can make your congestion worse.  If needed, take an over-the-counter pain medicine, such as acetaminophen (Tylenol), ibuprofen (Advil, Motrin), or naproxen (Aleve). Read and follow all instructions on the label.  If the doctor prescribed antibiotics, take them as directed. Do not stop taking them just  "because you feel better. You need to take the full course of antibiotics.  Be careful when taking over-the-counter cold or flu medicines and Tylenol at the same time. Many of these medicines have acetaminophen, which is Tylenol. Read the labels to make sure that you are not taking more than the recommended dose. Too much acetaminophen (Tylenol) can be harmful.  Try a steroid nasal spray. It may help with your symptoms.  Breathe warm, moist air. You can use a steamy shower, a hot bath, or a sink filled with hot water. Avoid cold, dry air. Using a humidifier in your home may help. Follow the directions for cleaning the machine.  When should you call for help?   Call your doctor now or seek immediate medical care if:    You have new or worse swelling, redness, or pain in your face or around one or both of your eyes.     You have double vision or a change in your vision.     You have a high fever.     You have a severe headache and a stiff neck.     You have mental changes, such as feeling confused or much less alert.   Watch closely for changes in your health, and be sure to contact your doctor if:    You are not getting better as expected.   Where can you learn more?  Go to https://www.[employer].net/patiented  Enter I933 in the search box to learn more about \"Acute Sinusitis: Care Instructions.\"  Current as of: March 1, 2023               Content Version: 13.7    5451-5254 [employer].   Care instructions adapted under license by your healthcare professional. If you have questions about a medical condition or this instruction, always ask your healthcare professional. [employer] disclaims any warranty or liability for your use of this information.      You may want to try a nasal lavage (also known as nasal irrigation). You can find over-the-counter products, such as Neti-Pot, at retail locations or make your own at home. Instructions for homemade nasal lavage and more information on the " process are available online at http://www.aafp.org/afp/2009/1115/p1121.html.    Dear Cristo Dong    After reviewing your responses, I've been able to diagnose you with Acute sinusitis, recurrence not specified, unspecified location.      Based on your responses and diagnosis, I have prescribed No orders of the defined types were placed in this encounter.   to treat your symptoms. I have sent this to your pharmacy.?     It is also important to stay well hydrated, get lots of rest and take over-the-counter decongestants,?tylenol?or ibuprofen if you?are able to?take those medications per your primary care provider to help relieve discomfort.?     It is important that you take?all of?your prescribed medication even if your symptoms are improving after a few doses.? Taking?all of?your medicine helps prevent the symptoms from returning.?     If your symptoms worsen, you develop severe headache, vomiting, high fever (>102), or are not improving in 7 days, please contact your primary care provider for an appointment or visit any of our convenient Walk-in Care or Urgent Care Centers to be seen which can be found on our website?here.?     Thanks again for choosing?us?as your health care partner,?   ?  Jayy Santos MD, MD?

## 2023-10-27 ENCOUNTER — DOCUMENTATION ONLY (OUTPATIENT)
Dept: SLEEP MEDICINE | Facility: CLINIC | Age: 48
End: 2023-10-27
Payer: COMMERCIAL

## 2023-10-27 DIAGNOSIS — G47.33 OBSTRUCTIVE SLEEP APNEA (ADULT) (PEDIATRIC): Primary | ICD-10-CM

## 2023-10-27 DIAGNOSIS — I10 ESSENTIAL HYPERTENSION, MALIGNANT: ICD-10-CM

## 2023-10-27 NOTE — PROGRESS NOTES
Patient was offered choice of vendor and chose [facility].  Patient Cristo Dong was set up at [place] on October 27, 2023. Patient received a Resmed Airsense 10 Pressures were set at  5-15 cm H2O.   Patient s ramp is 4 cm H2O for Auto and FLEX/EPR is EPR, 3.  Patient received a Resmed Mask name: AIRFIT P10  Pillow mask size Medium, heated tubing and heated humidifier.  Patient has the following compliance requirements: none.  Sridhar Roberto

## 2023-10-30 ENCOUNTER — DOCUMENTATION ONLY (OUTPATIENT)
Dept: SLEEP MEDICINE | Facility: CLINIC | Age: 48
End: 2023-10-30
Payer: COMMERCIAL

## 2023-10-30 NOTE — PROGRESS NOTES
3 day Sleep therapy management telephone visit    Diagnostic AHI:  11.9 HST    Confirmed with patient at time of call- N/A Patient is still interested in STM service       Message left for patient to return call.        Objective data     Order Settings for PAP  CPAP min     CPAP max              Device settings from machine CPAP min 5     CPAP max 15                      Assessment: No usage but has account in [employer]"ator      Action plan: Patient to have 14 day STM visit. Patient has a follow up visit scheduled:   no    Replacement device: No  STM ordered by provider: Yes     Total time spent on accessing and  interpreting remote patient PAP therapy data  10 minutes    Total time spent counseling, coaching  and reviewing PAP therapy data with patient  1 minutes    [id number] no

## 2023-11-17 ENCOUNTER — OFFICE VISIT (OUTPATIENT)
Dept: FAMILY MEDICINE | Facility: CLINIC | Age: 48
End: 2023-11-17
Payer: COMMERCIAL

## 2023-11-17 ENCOUNTER — DOCUMENTATION ONLY (OUTPATIENT)
Dept: SLEEP MEDICINE | Facility: CLINIC | Age: 48
End: 2023-11-17
Payer: COMMERCIAL

## 2023-11-17 VITALS
RESPIRATION RATE: 15 BRPM | TEMPERATURE: 97.1 F | HEART RATE: 74 BPM | HEIGHT: 71 IN | DIASTOLIC BLOOD PRESSURE: 86 MMHG | BODY MASS INDEX: 35.29 KG/M2 | WEIGHT: 252.1 LBS | SYSTOLIC BLOOD PRESSURE: 140 MMHG | OXYGEN SATURATION: 98 %

## 2023-11-17 DIAGNOSIS — I10 HTN, GOAL BELOW 140/90: Primary | ICD-10-CM

## 2023-11-17 PROCEDURE — 90480 ADMN SARSCOV2 VAC 1/ONLY CMP: CPT | Performed by: FAMILY MEDICINE

## 2023-11-17 PROCEDURE — 91320 SARSCV2 VAC 30MCG TRS-SUC IM: CPT | Performed by: FAMILY MEDICINE

## 2023-11-17 PROCEDURE — 90471 IMMUNIZATION ADMIN: CPT | Performed by: FAMILY MEDICINE

## 2023-11-17 PROCEDURE — 99214 OFFICE O/P EST MOD 30 MIN: CPT | Mod: 25 | Performed by: FAMILY MEDICINE

## 2023-11-17 PROCEDURE — 90636 HEP A/HEP B VACC ADULT IM: CPT | Performed by: FAMILY MEDICINE

## 2023-11-17 PROCEDURE — 90686 IIV4 VACC NO PRSV 0.5 ML IM: CPT | Performed by: FAMILY MEDICINE

## 2023-11-17 PROCEDURE — 90472 IMMUNIZATION ADMIN EACH ADD: CPT | Performed by: FAMILY MEDICINE

## 2023-11-17 RX ORDER — LISINOPRIL 10 MG/1
10 TABLET ORAL AT BEDTIME
Qty: 90 TABLET | Refills: 1 | Status: SHIPPED | OUTPATIENT
Start: 2023-11-17 | End: 2024-04-25

## 2023-11-17 ASSESSMENT — PAIN SCALES - GENERAL: PAINLEVEL: NO PAIN (0)

## 2023-11-17 NOTE — PROGRESS NOTES
14  DAY STM VISIT    Diagnostic AHI:  11.9 HST    Subjective measures:  Patient reports they have not set up their CPAP yet. They plan to start this weekend.  Patient was provided my contact information if they have any questions or concerns.   Pt was given my contact information and instructed to reach out with any questions or concerns.       Assessment: Pt not meeting objective benchmarks for compliance       Action plan: pt to have 30 day STM visit.      Device type: Auto-CPAP    PAP settings:  DEVICE TYPE CPAP MIN CPAP MAX 95TH % PRESSURE EPR MASK DISPENSED   Auto-CPAP    5 cm  H20 15 cm  H20 0 cm  H20  2 Per patient choice     Mask type:  Per patient choice    Objective measures: 14 day rolling measures   COMPLIANCE LEAK AHI AVERAGE USE IN MINUTES   0 % 0 0 0   GOAL >70% GOAL < 24 LPM GOAL <5 GOAL >240      Patient has the following upcoming sleep appts:      Total time spent on accessing and interpreting remote patient PAP therapy data  10 minutes    Total time spent counseling, coaching  and reviewing PAP therapy data with patient  1 minutes    19003um  11210  no (3 day STM)

## 2023-11-17 NOTE — PROGRESS NOTES
"  Assessment & Plan     HTN, goal below 140/90  Persistent, will start ACE as below  - lisinopril (ZESTRIL) 10 MG tablet  Dispense: 90 tablet; Refill: 1    18680}     BMI:   Estimated body mass index is 35.66 kg/m  as calculated from the following:    Height as of this encounter: 1.791 m (5' 10.5\").    Weight as of this encounter: 114.4 kg (252 lb 1.6 oz).   Weight management plan: see AVS, recommended intermittent fasting, not eating at all at night, and cutting out alcohol    Keesha Miranda MD  [facility]    Danny Lemons is a 48 year old, presenting for the following health issues:  RECHECK (Blood pressure)      11/17/2023     9:35 AM   Additional Questions   Roomed by Trisha CASTRO       History of Present Illness       Hypertension: He presents for follow up of hypertension.  He does check blood pressure  regularly outside of the clinic. Outside blood pressures have been over 140/90. He does not follow a low salt diet.     He eats 2-3 servings of fruits and vegetables daily.He consumes 0 sweetened beverage(s) daily.He exercises with enough effort to increase his heart rate 30 to 60 minutes per day.  He exercises with enough effort to increase his heart rate 6 days per week. He is missing 1 dose(s) of medications per week.     BP Readings from Last 3 Encounters:   11/17/23 (!) 140/86   03/30/23 (!) 138/91   05/13/22 (!) 146/92      Wt Readings from Last 4 Encounters:   11/17/23 114.4 kg (252 lb 1.6 oz)   08/01/23 112 kg (247 lb)   03/30/23 114.3 kg (252 lb)   05/13/22 112.5 kg (248 lb)     Impaired fasting glucose Follow-up    Patient is checking blood sugars: not at all  Diabetic concerns: None   Symptoms of hypoglycemia (low blood sugar): none   Paresthesias (numbness or burning in feet) or sores: No    Lab Results   Component Value Date    A1C 5.3 04/06/2023          Review of Systems   Constitutional, HEENT, cardiovascular, pulmonary, gi and gu systems are negative, except " "as otherwise noted.      Objective    BP (!) 140/86 (BP Location: Right arm, Patient Position: Sitting, Cuff Size: Adult Regular)   Pulse 74   Temp 97.1  F (36.2  C) (Temporal)   Resp 15   Ht 1.791 m (5' 10.5\")   Wt 114.4 kg (252 lb 1.6 oz)   SpO2 98%   BMI 35.66 kg/m    Body mass index is 35.66 kg/m .  Physical Exam   GENERAL: healthy, alert and no distress  NECK: no adenopathy, no asymmetry, masses, or scars and thyroid normal to palpation  RESP: lungs clear to auscultation - no rales, rhonchi or wheezes  CV: regular rate and rhythm, normal S1 S2, no S3 or S4, no murmur, click or rub, no peripheral edema and peripheral pulses strong  ABDOMEN: soft, nontender, no hepatosplenomegaly, no masses and bowel sounds normal  MS: no gross musculoskeletal defects noted, no edema                "

## 2023-11-17 NOTE — PROGRESS NOTES
Prior to immunization administration, verified patients identity using patient s name and date of birth. Please see Immunization Activity for additional information.     Screening Questionnaire for Adult Immunization    Are you sick today?   No   Do you have allergies to medications, food, a vaccine component or latex?   No   Have you ever had a serious reaction after receiving a vaccination?   No   Do you have a long-term health problem with heart, lung, kidney, or metabolic disease (e.g., diabetes), asthma, a blood disorder, no spleen, complement component deficiency, a cochlear implant, or a spinal fluid leak?  Are you on long-term aspirin therapy?   No   Do you have cancer, leukemia, HIV/AIDS, or any other immune system problem?   No   Do you have a parent, brother, or sister with an immune system problem?   No   In the past 3 months, have you taken medications that affect  your immune system, such as prednisone, other steroids, or anticancer drugs; drugs for the treatment of rheumatoid arthritis, Crohn s disease, or psoriasis; or have you had radiation treatments?   No   Have you had a seizure, or a brain or other nervous system problem?   No   During the past year, have you received a transfusion of blood or blood    products, or been given immune (gamma) globulin or antiviral drug?   No   For women: Are you pregnant or is there a chance you could become       pregnant during the next month?   No   Have you received any vaccinations in the past 4 weeks?   No     Immunization questionnaire answers were all negative.      Patient instructed to remain in clinic for 15 minutes afterwards, and to report any adverse reactions.     Screening performed by Handy Roca RN on 11/17/2023 at 10:23 AM.

## 2023-11-17 NOTE — PATIENT INSTRUCTIONS
Recent Labs   Lab Test 03/30/23  0818 08/11/21  0649   CHOL 199 184   HDL 62 62   * 107*   TRIG 118 75     Liver Function Studies -   Recent Labs   Lab Test 03/30/23  0818   PROTTOTAL 7.3   ALBUMIN 4.3   BILITOTAL 0.6   ALKPHOS 52   AST 31   ALT 36     BP Readings from Last 3 Encounters:   11/17/23 (!) 140/86   03/30/23 (!) 138/91   05/13/22 (!) 146/92        To keep triglycerides in check,  reduce carbohydrates/sugar in your diet by reducing portion size of carbohydrates including sweets, juice, alcohol, white bread, crackers, pasta, rice, potatoes and cereal.  Please cut out alcohol at night.     Intermittent fasting, allowing at least 12 hours between dinner and breakfast, or even 16 hours or more can also help lower your total and LDL cholesterol as well as your triglycerides. The goal is to try to push all your daily calories into a window between say 8 am and 6 pm, and really try to reduce calories consumed in the afternoon and evening, aiming for not eating at all right before bed.

## 2023-11-27 ENCOUNTER — MYC MEDICAL ADVICE (OUTPATIENT)
Dept: FAMILY MEDICINE | Facility: CLINIC | Age: 48
End: 2023-11-27
Payer: COMMERCIAL

## 2023-11-29 NOTE — TELEPHONE ENCOUNTER
E visit advised for sinus issue    Shaunna Childs, RN, BSN  [facility]

## 2023-12-11 ENCOUNTER — E-VISIT (OUTPATIENT)
Dept: URGENT CARE | Facility: CLINIC | Age: 48
End: 2023-12-11
Payer: COMMERCIAL

## 2023-12-11 DIAGNOSIS — J01.90 ACUTE SINUSITIS, RECURRENCE NOT SPECIFIED, UNSPECIFIED LOCATION: Primary | ICD-10-CM

## 2023-12-11 PROCEDURE — 99421 OL DIG E/M SVC 5-10 MIN: CPT | Performed by: FAMILY MEDICINE

## 2023-12-11 RX ORDER — DOXYCYCLINE HYCLATE 100 MG
100 TABLET ORAL 2 TIMES DAILY
Qty: 14 TABLET | Refills: 0 | Status: SHIPPED | OUTPATIENT
Start: 2023-12-11 | End: 2023-12-18

## 2023-12-11 NOTE — PATIENT INSTRUCTIONS
Acute Sinusitis: Care Instructions  Overview     Acute sinusitis is an inflammation of the mucous membranes inside the nose and sinuses. Sinuses are the hollow spaces in your skull around the eyes and nose. Acute sinusitis often follows a cold. Acute sinusitis causes thick, discolored mucus that drains from the nose or down the back of the throat. It also can cause pain and pressure in your head and face along with a stuffy or blocked nose.  In most cases, sinusitis gets better on its own in 1 to 2 weeks. But some mild symptoms may last for several weeks. Sometimes antibiotics are needed if there is a bacterial infection.  Follow-up care is a key part of your treatment and safety. Be sure to make and go to all appointments, and call your doctor if you are having problems. It's also a good idea to know your test results and keep a list of the medicines you take.  How can you care for yourself at home?  Use saline (saltwater) nasal washes. This can help keep your nasal passages open and wash out mucus and allergens.  You can buy saline nose washes at a grocery store or drugstore. Follow the instructions on the package.  You can make your own at home. Add 1 teaspoon of non-iodized salt and 1 teaspoon of baking soda to 2 cups of distilled or boiled and cooled water. Fill a squeeze bottle or a nasal cleansing pot (such as a neti pot) with the nasal wash. Then put the tip into your nostril, and lean over the sink. With your mouth open, gently squirt the liquid. Repeat on the other side.  Try a decongestant nasal spray like oxymetazoline (Afrin). Do not use it for more than 3 days in a row. Using it for more than 3 days can make your congestion worse.  If needed, take an over-the-counter pain medicine, such as acetaminophen (Tylenol), ibuprofen (Advil, Motrin), or naproxen (Aleve). Read and follow all instructions on the label.  If the doctor prescribed antibiotics, take them as directed. Do not stop taking them just  "because you feel better. You need to take the full course of antibiotics.  Be careful when taking over-the-counter cold or flu medicines and Tylenol at the same time. Many of these medicines have acetaminophen, which is Tylenol. Read the labels to make sure that you are not taking more than the recommended dose. Too much acetaminophen (Tylenol) can be harmful.  Try a steroid nasal spray. It may help with your symptoms.  Breathe warm, moist air. You can use a steamy shower, a hot bath, or a sink filled with hot water. Avoid cold, dry air. Using a humidifier in your home may help. Follow the directions for cleaning the machine.  When should you call for help?   Call your doctor now or seek immediate medical care if:    You have new or worse swelling, redness, or pain in your face or around one or both of your eyes.     You have double vision or a change in your vision.     You have a high fever.     You have a severe headache and a stiff neck.     You have mental changes, such as feeling confused or much less alert.   Watch closely for changes in your health, and be sure to contact your doctor if:    You are not getting better as expected.   Where can you learn more?  Go to https://www.snapp.me.net/patiented  Enter I933 in the search box to learn more about \"Acute Sinusitis: Care Instructions.\"  Current as of: February 28, 2023               Content Version: 13.8    8993-7995 [employer].   Care instructions adapted under license by your healthcare professional. If you have questions about a medical condition or this instruction, always ask your healthcare professional. [employer] disclaims any warranty or liability for your use of this information.      Dear Cristo Dong    After reviewing your responses, I've been able to diagnose you with Acute sinusitis, recurrence not specified, unspecified location.      Based on your responses and diagnosis, I have prescribed   Orders Placed This " Encounter   Medications     doxycycline hyclate (VIBRA-TABS) 100 MG tablet     Sig: Take 1 tablet (100 mg) by mouth 2 times daily for 7 days     Dispense:  14 tablet     Refill:  0     May substitute any formulation of 100mg doxycycline available and best covered by insurance      to treat your symptoms. I have sent this to your pharmacy.?     It is also important to stay well hydrated, get lots of rest and take over-the-counter decongestants,?tylenol?or ibuprofen if you?are able to?take those medications per your primary care provider to help relieve discomfort.?     It is important that you take?all of?your prescribed medication even if your symptoms are improving after a few doses.? Taking?all of?your medicine helps prevent the symptoms from returning.?     If your symptoms worsen, you develop severe headache, vomiting, high fever (>102), or are not improving in 7 days, please contact your primary care provider for an appointment or visit any of our convenient Walk-in Care or Urgent Care Centers to be seen which can be found on our website?here.?     Thanks again for choosing?us?as your health care partner,?   ?  Edu Lawrence MD?   Thank you for choosing us for your care. I have placed an order for a prescription so that you can start treatment. View your full visit summary for details by clicking on the link below. Your pharmacist will able to address any questions you may have about the medication.     If you're not feeling better within 5-7 days, please schedule an appointment.  You can schedule an appointment right here in [facility], or call [phone]  If the visit is for the same symptoms as your eVisit, we'll refund the cost of your eVisit if seen within seven days.

## 2024-01-23 NOTE — TELEPHONE ENCOUNTER
Appointment scheduled   NOT A DUPLICATE, pt never got refill sent last week, order status is processing    Requested Prescriptions     Pending Prescriptions Disp Refills    gabapentin (NEURONTIN) 300 MG capsule 90 capsule 0     Sig: Take 1 capsule by mouth nightly for 90 days. Intended supply: 90 days       Last Clinic Visit:  12/14/2023     Next Clinic Appointment:  Visit date not found

## 2024-02-27 NOTE — LETTER
[facility] [address] [place] [address]  [phone]  Dept: [phone]        May 19, 2022        Dear Cristo Dong,     You tested positive for COVID-19 on 05/13/2022 and before travel, you must complete a full 10 day isolation period. You are cleared to travel on 05/24/2022    Please visit the CDC travel web site for additional information: https://www.cdc.gov/coronavirus/2019-ncov/travelers/index.html  Please check your airline carrier/cruise company for their most up-to-date guidelines.    Sincerely,        Keesha Miranda MD/at               Not applicable

## 2024-02-29 ENCOUNTER — PATIENT OUTREACH (OUTPATIENT)
Dept: CARE COORDINATION | Facility: CLINIC | Age: 49
End: 2024-02-29
Payer: COMMERCIAL

## 2024-03-14 ENCOUNTER — PATIENT OUTREACH (OUTPATIENT)
Dept: CARE COORDINATION | Facility: CLINIC | Age: 49
End: 2024-03-14
Payer: COMMERCIAL

## 2024-04-24 DIAGNOSIS — I82.409 RECURRENT DEEP VEIN THROMBOSIS (DVT) (H): ICD-10-CM

## 2024-04-24 DIAGNOSIS — I10 HTN, GOAL BELOW 140/90: ICD-10-CM

## 2024-04-25 RX ORDER — RIVAROXABAN 10 MG/1
10 TABLET, FILM COATED ORAL
Qty: 90 TABLET | Refills: 3 | Status: SHIPPED | OUTPATIENT
Start: 2024-04-25

## 2024-04-25 RX ORDER — LISINOPRIL 10 MG/1
10 TABLET ORAL AT BEDTIME
Qty: 90 TABLET | Refills: 3 | Status: SHIPPED | OUTPATIENT
Start: 2024-04-25

## 2024-06-23 ENCOUNTER — HEALTH MAINTENANCE LETTER (OUTPATIENT)
Age: 49
End: 2024-06-23

## 2024-09-26 ENCOUNTER — PATIENT OUTREACH (OUTPATIENT)
Dept: CARE COORDINATION | Facility: CLINIC | Age: 49
End: 2024-09-26
Payer: COMMERCIAL

## 2024-10-30 SDOH — HEALTH STABILITY: PHYSICAL HEALTH: ON AVERAGE, HOW MANY DAYS PER WEEK DO YOU ENGAGE IN MODERATE TO STRENUOUS EXERCISE (LIKE A BRISK WALK)?: 6 DAYS

## 2024-10-30 SDOH — HEALTH STABILITY: PHYSICAL HEALTH: ON AVERAGE, HOW MANY MINUTES DO YOU ENGAGE IN EXERCISE AT THIS LEVEL?: 40 MIN

## 2024-10-30 ASSESSMENT — SOCIAL DETERMINANTS OF HEALTH (SDOH): HOW OFTEN DO YOU GET TOGETHER WITH FRIENDS OR RELATIVES?: MORE THAN THREE TIMES A WEEK

## 2024-10-31 ENCOUNTER — OFFICE VISIT (OUTPATIENT)
Dept: FAMILY MEDICINE | Facility: CLINIC | Age: 49
End: 2024-10-31
Payer: COMMERCIAL

## 2024-10-31 VITALS
SYSTOLIC BLOOD PRESSURE: 122 MMHG | WEIGHT: 254 LBS | RESPIRATION RATE: 16 BRPM | OXYGEN SATURATION: 99 % | DIASTOLIC BLOOD PRESSURE: 84 MMHG | BODY MASS INDEX: 36.36 KG/M2 | TEMPERATURE: 97.5 F | HEIGHT: 70 IN | HEART RATE: 65 BPM

## 2024-10-31 DIAGNOSIS — Z00.00 ROUTINE GENERAL MEDICAL EXAMINATION AT A HEALTH CARE FACILITY: Primary | ICD-10-CM

## 2024-10-31 DIAGNOSIS — Z13.6 CARDIOVASCULAR SCREENING; LDL GOAL LESS THAN 130: ICD-10-CM

## 2024-10-31 DIAGNOSIS — E55.9 VITAMIN D DEFICIENCY: ICD-10-CM

## 2024-10-31 DIAGNOSIS — I82.409 RECURRENT DEEP VEIN THROMBOSIS (DVT) (H): ICD-10-CM

## 2024-10-31 DIAGNOSIS — R73.01 IMPAIRED FASTING GLUCOSE: ICD-10-CM

## 2024-10-31 DIAGNOSIS — I10 HTN, GOAL BELOW 140/90: ICD-10-CM

## 2024-10-31 DIAGNOSIS — L72.3 SEBACEOUS CYST: ICD-10-CM

## 2024-10-31 LAB
BASOPHILS # BLD AUTO: 0 10E3/UL (ref 0–0.2)
BASOPHILS NFR BLD AUTO: 1 %
EOSINOPHIL # BLD AUTO: 0.2 10E3/UL (ref 0–0.7)
EOSINOPHIL NFR BLD AUTO: 4 %
ERYTHROCYTE [DISTWIDTH] IN BLOOD BY AUTOMATED COUNT: 13.1 % (ref 10–15)
EST. AVERAGE GLUCOSE BLD GHB EST-MCNC: 105 MG/DL
HBA1C MFR BLD: 5.3 % (ref 0–5.6)
HCT VFR BLD AUTO: 45.4 % (ref 40–53)
HGB BLD-MCNC: 14.4 G/DL (ref 13.3–17.7)
IMM GRANULOCYTES # BLD: 0 10E3/UL
IMM GRANULOCYTES NFR BLD: 0 %
LYMPHOCYTES # BLD AUTO: 2.2 10E3/UL (ref 0.8–5.3)
LYMPHOCYTES NFR BLD AUTO: 38 %
MCH RBC QN AUTO: 27.6 PG (ref 26.5–33)
MCHC RBC AUTO-ENTMCNC: 31.7 G/DL (ref 31.5–36.5)
MCV RBC AUTO: 87 FL (ref 78–100)
MONOCYTES # BLD AUTO: 0.5 10E3/UL (ref 0–1.3)
MONOCYTES NFR BLD AUTO: 8 %
NEUTROPHILS # BLD AUTO: 2.8 10E3/UL (ref 1.6–8.3)
NEUTROPHILS NFR BLD AUTO: 49 %
PLATELET # BLD AUTO: 272 10E3/UL (ref 150–450)
RBC # BLD AUTO: 5.22 10E6/UL (ref 4.4–5.9)
WBC # BLD AUTO: 5.8 10E3/UL (ref 4–11)

## 2024-10-31 PROCEDURE — 90673 RIV3 VACCINE NO PRESERV IM: CPT | Performed by: FAMILY MEDICINE

## 2024-10-31 PROCEDURE — 80048 BASIC METABOLIC PNL TOTAL CA: CPT | Performed by: FAMILY MEDICINE

## 2024-10-31 PROCEDURE — 83036 HEMOGLOBIN GLYCOSYLATED A1C: CPT | Performed by: FAMILY MEDICINE

## 2024-10-31 PROCEDURE — 99396 PREV VISIT EST AGE 40-64: CPT | Mod: 25 | Performed by: FAMILY MEDICINE

## 2024-10-31 PROCEDURE — 91320 SARSCV2 VAC 30MCG TRS-SUC IM: CPT | Performed by: FAMILY MEDICINE

## 2024-10-31 PROCEDURE — 85025 COMPLETE CBC W/AUTO DIFF WBC: CPT | Performed by: FAMILY MEDICINE

## 2024-10-31 PROCEDURE — 90472 IMMUNIZATION ADMIN EACH ADD: CPT | Performed by: FAMILY MEDICINE

## 2024-10-31 PROCEDURE — 90471 IMMUNIZATION ADMIN: CPT | Performed by: FAMILY MEDICINE

## 2024-10-31 PROCEDURE — 36415 COLL VENOUS BLD VENIPUNCTURE: CPT | Performed by: FAMILY MEDICINE

## 2024-10-31 PROCEDURE — 99214 OFFICE O/P EST MOD 30 MIN: CPT | Mod: 25 | Performed by: FAMILY MEDICINE

## 2024-10-31 PROCEDURE — 90746 HEPB VACCINE 3 DOSE ADULT IM: CPT | Performed by: FAMILY MEDICINE

## 2024-10-31 PROCEDURE — 82306 VITAMIN D 25 HYDROXY: CPT | Performed by: FAMILY MEDICINE

## 2024-10-31 PROCEDURE — 90480 ADMN SARSCOV2 VAC 1/ONLY CMP: CPT | Performed by: FAMILY MEDICINE

## 2024-10-31 PROCEDURE — 80061 LIPID PANEL: CPT | Performed by: FAMILY MEDICINE

## 2024-10-31 ASSESSMENT — PAIN SCALES - GENERAL: PAINLEVEL_OUTOF10: NO PAIN (0)

## 2024-10-31 NOTE — RESULT ENCOUNTER NOTE
Hi, it was wonderful as usual to see you! Some of the labs are back already and look great!    Your cbc, or complete blood count, which measures red blood cells (to check for anemia and other vitamin deficiencies) and white blood cells (to check for infection and leukemia) is normal, which is great!  Your platelets, which reflect liver function and ability to clot, are normal as well.      Thank you for getting labs done. Your lab test to check for diabetes, HgA1C, also called glycosylated hemoglobin, which measures the level of sugar in your blood over the past few months, is normal which is great! Congratulations, you don't have diabetes!      Sincerely,    MICHELL PEREZ MD   10/31/2024

## 2024-10-31 NOTE — PATIENT INSTRUCTIONS
Patient Education   Preventive Care Advice   This is general advice given by our system to help you stay healthy. However, your care team may have specific advice just for you. Please talk to your care team about your preventive care needs.  Nutrition  Eat 5 or more servings of fruits and vegetables each day.  Try wheat bread, brown rice and whole grain pasta (instead of white bread, rice, and pasta).  Get enough calcium and vitamin D. Check the label on foods and aim for 100% of the RDA (recommended daily allowance).  Lifestyle  Exercise at least 150 minutes each week  (30 minutes a day, 5 days a week).  Do muscle strengthening activities 2 days a week. These help control your weight and prevent disease.  No smoking.  Wear sunscreen to prevent skin cancer.  Have a dental exam and cleaning every 6 months.  Yearly exams  See your health care team every year to talk about:  Any changes in your health.  Any medicines your care team has prescribed.  Preventive care, family planning, and ways to prevent chronic diseases.  Shots (vaccines)   HPV shots (up to age 26), if you've never had them before.  Hepatitis B shots (up to age 59), if you've never had them before.  COVID-19 shot: Get this shot when it's due.  Flu shot: Get a flu shot every year.  Tetanus shot: Get a tetanus shot every 10 years.  Pneumococcal, hepatitis A, and RSV shots: Ask your care team if you need these based on your risk.  Shingles shot (for age 50 and up)  General health tests  Diabetes screening:  Starting at age 35, Get screened for diabetes at least every 3 years.  If you are younger than age 35, ask your care team if you should be screened for diabetes.  Cholesterol test: At age 39, start having a cholesterol test every 5 years, or more often if advised.  Bone density scan (DEXA): At age 50, ask your care team if you should have this scan for osteoporosis (brittle bones).  Hepatitis C: Get tested at least once in your life.  STIs (sexually  transmitted infections)  Before age 24: Ask your care team if you should be screened for STIs.  After age 24: Get screened for STIs if you're at risk. You are at risk for STIs (including HIV) if:  You are sexually active with more than one person.  You don't use condoms every time.  You or a partner was diagnosed with a sexually transmitted infection.  If you are at risk for HIV, ask about PrEP medicine to prevent HIV.  Get tested for HIV at least once in your life, whether you are at risk for HIV or not.  Cancer screening tests  Cervical cancer screening: If you have a cervix, begin getting regular cervical cancer screening tests starting at age 21.  Breast cancer scan (mammogram): If you've ever had breasts, begin having regular mammograms starting at age 40. This is a scan to check for breast cancer.  Colon cancer screening: It is important to start screening for colon cancer at age 45.  Have a colonoscopy test every 10 years (or more often if you're at risk) Or, ask your provider about stool tests like a FIT test every year or Cologuard test every 3 years.  To learn more about your testing options, visit:   .  For help making a decision, visit:   https://bit.ly/sn62018.  Prostate cancer screening test: If you have a prostate, ask your care team if a prostate cancer screening test (PSA) at age 55 is right for you.  Lung cancer screening: If you are a current or former smoker ages 50 to 80, ask your care team if ongoing lung cancer screenings are right for you.  For informational purposes only. Not to replace the advice of your health care provider. Copyright   2023 [facility]. All rights reserved. Clinically reviewed by the [facility]. Remicalm 318225 - REV 01/24.  9 Ways to Cut Back on Drinking  Maybe you've found yourself drinking more alcohol than you'd prefer. If you want to cut back, here are some ideas to try.    Think before you drink.  Do you really want a drink,  "or is it just a habit? If you're used to having a drink at a certain time, try doing something else then.     Look for substitutes.  Find some no-alcohol drinks that you enjoy, like flavored seltzer water, tea with honey, or tonic with a slice of lime. Or try alcohol-free beer or \"virgin\" cocktails (without the alcohol).     Drink more water.  Use water to quench your thirst. Drink a glass of water before you have any alcohol. Have another glass along with every drink or between drinks.     Shrink your drink.  For example, have a bottle of beer instead of a pint. Use a smaller glass for wine. Choose drinks with lower alcohol content (ABV%). Or use less liquor and more mixer in cocktails.     Slow down.  It's easy to drink quickly and without thinking about it. Pay attention, and make each drink last longer.     Do the math.  Total up how much you spend on alcohol each month. How much is that a year? If you cut back, what could you do with the money you save?     Take a break.  Choose a day or two each week when you won't drink at all. Notice how you feel on those days, physically and emotionally. How did you sleep? Do you feel better? Over time, add more break days.     Count calories.  Would you like to lose some weight? For some people that's a good motivator for cutting back. Figure out how many calories are in each drink. How many does that add up to in a day? In a week? In a month?     Practice saying no.  Be ready when someone offers you a drink. Try: \"Thanks, I've had enough.\" Or \"Thanks, but I'm cutting back.\" Or \"No, thanks. I feel better when I drink less.\"   Current as of: November 15, 2023  Content Version: 14.2 2024 [employer].   Care instructions adapted under license by your healthcare professional. If you have questions about a medical condition or this instruction, always ask your healthcare professional. [employer] disclaims any warranty or liability for your use of " this information.

## 2024-10-31 NOTE — NURSING NOTE
..Prior to immunization administration, verified patients identity using patient s name and date of birth. Please see Immunization Activity for additional information.     Screening Questionnaire for Adult Immunization     Are you sick today?   No   Do you have allergies to medications, food, a vaccine component or latex?   No   Have you ever had a serious reaction after receiving a vaccination?   No   Do you have a long-term health problem with heart, lung, kidney, or metabolic disease (e.g., diabetes), asthma, a blood disorder, no spleen, complement component deficiency, a cochlear implant, or a spinal fluid leak?  Are you on long-term aspirin therapy?   No   Do you have cancer, leukemia, HIV/AIDS, or any other immune system problem?   No   Do you have a parent, brother, or sister with an immune system problem?   No   In the past 3 months, have you taken medications that affect  your immune system, such as prednisone, other steroids, or anticancer drugs; drugs for the treatment of rheumatoid arthritis, Crohn s disease, or psoriasis; or have you had radiation treatments?   No   Have you had a seizure, or a brain or other nervous system problem?   No   During the past year, have you received a transfusion of blood or blood    products, or been given immune (gamma) globulin or antiviral drug?   No   For women: Are you pregnant or is there a chance you could become       pregnant during the next month?   No   Have you received any vaccinations in the past 4 weeks?   No     Immunization questionnaire answers were all negative.      Patient instructed to remain in clinic for 15 minutes afterwards, and to report any adverse reactions.     Screening performed by Bess Millan on 10/31/2024 at 10:03 AM.

## 2024-10-31 NOTE — PROGRESS NOTES
"Preventive Care Visit  [facility]  Keesha Miranda MD, Family Medicine  Oct 31, 2024      Assessment & Plan     (Z00.00) Routine general medical examination at a health care facility  (primary encounter diagnosis)    (I10) HTN, goal below 140/90  Comment: At goal  The current medical regimen is effective;  continue present plan and medications.    Plan: BASIC METABOLIC PANEL          (I82.409) Recurrent deep vein thrombosis (DVT) (H)  Comment: no acute concerns, on xarelto  Plan: CBC with Platelets & Differential          (L72.3) Sebaceous cyst  Refer to specialist for removal  Plan: Adult Gen Surg  Referral          (R73.01) Impaired fasting glucose  Comment: stable  Plan: Hemoglobin A1c        Repeat labs annually    (E55.9) Vitamin D deficiency  Comment: taking a supplement, recheck levels  Plan: Vitamin D Deficiency        Will fu as indicated.     (Z13.6) CARDIOVASCULAR SCREENING; LDL GOAL LESS THAN 130  Comment:   LDL Cholesterol Calculated   Date Value Ref Range Status   03/30/2023 113 (H) <=100 mg/dL Final   05/19/2020 83 <100 mg/dL Final     Comment:     Desirable:       <100 mg/dl      Plan: Lipid panel reflex to direct LDL Fasting          Patient has been advised of split billing requirements and indicates understanding: Yes        BMI  Estimated body mass index is 36.45 kg/m  as calculated from the following:    Height as of this encounter: 1.778 m (5' 10\").    Weight as of this encounter: 115.2 kg (254 lb).   Weight management plan: discussed intermittent fasting, reducing processed simple carbohydrates, ongoing excellent exercise habits    Counseling  Appropriate preventive services were addressed with this patient via screening, questionnaire, or discussion as appropriate for fall prevention, nutrition, physical activity, Tobacco-use cessation, social engagement, weight loss and cognition.  Checklist reviewing preventive services available has been given to " the patient.  Reviewed patient's diet, addressing concerns and/or questions.   The patient reports drinking more than 3 alcoholic drinks per day and/or more than 7 drhnks per week. The patient was counseled and given information about possible harmful effects of excessive alcohol intake.    Danny Lemons is a 49 year old, presenting for the following:  Physical        10/31/2024     9:08 AM   Additional Questions   Roomed by Trisha AGGARWAL    Hypertension Follow-up    Do you check your blood pressure regularly outside of the clinic? No   Are you following a low salt diet? Yes  He's exercising regularly  Are your blood pressures ever more than 140 on the top number (systolic) OR more   than 90 on the bottom number (diastolic), for example 140/90? No  Low vitamin d Follow-up    Since last visit, patient describes the following symptoms: feeling much better since starting Vitamin D supplements  .vitd    Sebaceous cyst  Growing slowly, on crown of head, bothersome, he would like it removed.    Impaired fasting glucose Follow-up    Patient is checking blood sugars: not at all  Diabetic concerns: None   Symptoms of hypoglycemia (low blood sugar): none   Paresthesias (numbness or burning in feet) or sores: No    Lab Results   Component Value Date    A1C 5.3 04/06/2023     Health Care Directive  Patient does not have a Health Care Directive: Discussed advance care planning with patient; however, patient declined at this time.      10/30/2024   General Health   How would you rate your overall physical health? Good   Feel stress (tense, anxious, or unable to sleep) Not at all          10/30/2024   Nutrition   Three or more servings of calcium each day? Yes   Diet: Regular (no restrictions)   How many servings of fruit and vegetables per day? 4 or more   How many sweetened beverages each day? 0-1            10/30/2024   Exercise   Days per week of moderate/strenous exercise 6 days   Average minutes spent exercising  at this level 40 min            10/30/2024   Social Factors   Frequency of gathering with friends or relatives More than three times a week   Worry food won't last until get money to buy more No   Food not last or not have enough money for food? No   Do you have housing? (Housing is defined as stable permanent housing and does not include staying ouside in a car, in a tent, in an abandoned building, in an overnight shelter, or couch-surfing.) Yes   Are you worried about losing your housing? No   Lack of transportation? No   Unable to get utilities (heat,electricity)? No            10/30/2024   Fall Risk   Fallen 2 or more times in the past year? No    Trouble with walking or balance? No        Patient-reported          10/30/2024   Dental   Dentist two times every year? Yes            10/30/2024   TB Screening   Were you born outside of the [place]? No              Today's PHQ-2 Score:       8/2/2024     1:45 PM   PHQ-2 ( 1999 [employer])   Q1: Little interest or pleasure in doing things 0    Q2: Feeling down, depressed or hopeless 0    PHQ-2 Score 0   Q1: Little interest or pleasure in doing things Not at all   Q2: Feeling down, depressed or hopeless Not at all   PHQ-2 Score 0       Patient-reported         10/30/2024   Substance Use   Alcohol more than 3/day or more than 7/wk Yes   How often do you have a drink containing alcohol 4 or more times a week   How many alcohol drinks on typical day 1 or 2   How often do you have 5+ drinks at one occasion Less than monthly   Audit 2/3 Score 1   How often not able to stop drinking once started Never   How often failed to do what normally expected Never   How often needed first drink in am after a heavy drinking session Never   How often feeling of guilt or remorse after drinking Never   How often unable to remember what happened the night before Never   Have you or someone else been injured because of your drinking No   Has anyone been concerned or suggested you cut down on  drinking Yes, during the last year   TOTAL SCORE - AUDIT 9   Do you use any other substances recreationally? No        Social History     Tobacco Use    Smoking status: Never     Passive exposure: Never    Smokeless tobacco: Never   Vaping Use    Vaping status: Never Used   Substance Use Topics    Alcohol use: Yes     Alcohol/week: 10.0 standard drinks of alcohol     Types: 10 Cans of beer per week    Drug use: No           10/30/2024   STI Screening   New sexual partner(s) since last STI/HIV test? No      ASCVD Risk   The 10-year ASCVD risk score (Edmundo BRUCE, et al., 2019) is: 2.9%    Values used to calculate the score:      Age: 49 years      Sex: Male      Is Non- : No      Diabetic: No      Tobacco smoker: No      Systolic Blood Pressure: 122 mmHg      Is BP treated: Yes      HDL Cholesterol: 62 mg/dL      Total Cholesterol: 199 mg/dL        10/30/2024   Contraception/Family Planning   Questions about contraception or family planning No           Reviewed and updated as needed this visit by Provider                    Past Medical History:   Diagnosis Date    HTN, goal below 140/90 11/17/2023    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 OR    [facility] HAND/FINGER SURGERY UNLISTED      RT HAND INDEX FINGER NERVE DAMAGE    [facility] NERVE GRAFT,HAND/FOOT,ONE,=<4CM  1990    Nerve Graft - Right Hand    [facility] REPAIR CRUCIATE LIGAMENT,KNEE  1991    Right Knee     OB History   No obstetric history on file.         Review of Systems  Constitutional, HEENT, cardiovascular, pulmonary, gi and gu systems are negative, except as otherwise noted.     Objective    Exam  /84 (BP Location: Right arm, Patient Position: Sitting, Cuff Size: Adult Large)   Pulse  "65   Temp 97.5  F (36.4  C) (Temporal)   Resp 16   Ht 1.778 m (5' 10\")   Wt 115.2 kg (254 lb)   SpO2 99%   BMI 36.45 kg/m     Estimated body mass index is 36.45 kg/m  as calculated from the following:    Height as of this encounter: 1.778 m (5' 10\").    Weight as of this encounter: 115.2 kg (254 lb).    Physical Exam  GENERAL: alert and no distress  EYES: Eyes grossly normal to inspection, PERRL and conjunctivae and sclerae normal  HENT: ear canals and TM's normal, nose and mouth without ulcers or lesions  NECK: no adenopathy, no asymmetry, masses, or scars  RESP: lungs clear to auscultation - no rales, rhonchi or wheezes  CV: regular rate and rhythm, normal S1 S2, no S3 or S4, no murmur, click or rub, no peripheral edema  ABDOMEN: soft, nontender, no hepatosplenomegaly, no masses and bowel sounds normal  MS: no gross musculoskeletal defects noted, no edema  SKIN: no suspicious lesions or rashes  NEURO: Normal strength and tone, mentation intact and speech normal  PSYCH: mentation appears normal, affect normal/bright        Signed Electronically by: Keesha Miranda MD    "

## 2024-11-01 LAB
ANION GAP SERPL CALCULATED.3IONS-SCNC: 10 MMOL/L (ref 7–15)
BUN SERPL-MCNC: 14.4 MG/DL (ref 6–20)
CALCIUM SERPL-MCNC: 9.4 MG/DL (ref 8.8–10.4)
CHLORIDE SERPL-SCNC: 104 MMOL/L (ref 98–107)
CHOLEST SERPL-MCNC: 210 MG/DL
CREAT SERPL-MCNC: 0.81 MG/DL (ref 0.67–1.17)
EGFRCR SERPLBLD CKD-EPI 2021: >90 ML/MIN/1.73M2
FASTING STATUS PATIENT QL REPORTED: YES
FASTING STATUS PATIENT QL REPORTED: YES
GLUCOSE SERPL-MCNC: 94 MG/DL (ref 70–99)
HCO3 SERPL-SCNC: 25 MMOL/L (ref 22–29)
HDLC SERPL-MCNC: 61 MG/DL
LDLC SERPL CALC-MCNC: 123 MG/DL
NONHDLC SERPL-MCNC: 149 MG/DL
POTASSIUM SERPL-SCNC: 4.2 MMOL/L (ref 3.4–5.3)
SODIUM SERPL-SCNC: 139 MMOL/L (ref 135–145)
TRIGL SERPL-MCNC: 132 MG/DL
VIT D+METAB SERPL-MCNC: 35 NG/ML (ref 20–50)

## 2024-11-01 NOTE — RESULT ENCOUNTER NOTE
Hi, it was great to see you and catch up yesterday!  The rest of your labs are back and look good.    Your glucose level, electrolyte level and kidney function, measured with a test called the basic metabolic panel, is normal, which is great news!  Your kidney function is perfect.    Your vitamin D level is right where it should be, so just keep doing whatever you're doing!    Your total cholesterol is slightly high but your HDL is great (the higher the better) and your triglycerides and LDL are at goal (LDL under 130 is goal for anyone who doesn't have diabetes or heart disease).    Let me know if you have any questions, and I'll see you again in a year!    Sincerely,  Dr. Keesha Miranda MD  11/1/2024

## 2024-11-06 ENCOUNTER — OFFICE VISIT (OUTPATIENT)
Dept: SURGERY | Facility: CLINIC | Age: 49
End: 2024-11-06
Payer: COMMERCIAL

## 2024-11-06 ENCOUNTER — TELEPHONE (OUTPATIENT)
Dept: SURGERY | Facility: CLINIC | Age: 49
End: 2024-11-06

## 2024-11-06 VITALS
DIASTOLIC BLOOD PRESSURE: 76 MMHG | SYSTOLIC BLOOD PRESSURE: 142 MMHG | HEIGHT: 70 IN | HEART RATE: 65 BPM | WEIGHT: 254 LBS | BODY MASS INDEX: 36.36 KG/M2 | OXYGEN SATURATION: 97 %

## 2024-11-06 DIAGNOSIS — L72.3 SEBACEOUS CYST: ICD-10-CM

## 2024-11-06 PROCEDURE — 99203 OFFICE O/P NEW LOW 30 MIN: CPT | Performed by: SURGERY

## 2024-11-06 NOTE — LETTER
November 11, 2024          Keesha Miranda MD  [address]  [address]  [place] [address]      RE:   Cristo Dong 1975      Dear Colleague,    Thank you for referring your patient, Cristo Dong, to [facility] Please see a copy of my visit note below.    Met with Mr. Dong today in clinic to discuss excision of an enlarging scalp nodule.  This has been present for some time but has recently become larger in size.  Currently is evident even through his hair.  On exam he has a 2-1/2 cm round, mobile, rubbery, not tender mass on the left calvarium seems consistent with lipoma.     Scalp lipoma  I discussed with him the rationale for excision.  The risk, benefits, hopeful outcomes, possible complications were explained in detail.  He understands and would like to proceed.    Again, thank you for allowing me to participate in the care of your patient.      Sincerely,      Ludwig Christensen MD

## 2024-11-06 NOTE — TELEPHONE ENCOUNTER
Type of surgery: Excision of scalp cyst  Location of surgery: [facility]  Date and time of surgery: 2/10/25 at 12:30pm  Surgeon: Dr. Ludwig Christensen  Pre-Op Appt Date: patient to schedule  Post-Op Appt Date: patient to schedule   Packet sent out: Yes  Pre-cert/Authorization completed:  Not Applicable  Date: 11/6/24

## 2024-11-11 NOTE — PROGRESS NOTES
General Surgery note    Met with Mr. Dong today in clinic to discuss excision of an enlarging scalp nodule.  This has been present for some time but has recently become larger in size.  Currently is evident even through his hair.  On exam he has a 2-1/2 cm round, mobile, rubbery, not tender mass on the left calvarium seems consistent with lipoma.    Scalp lipoma  I discussed with him the rationale for excision.  The risk, benefits, hopeful outcomes, possible complications were explained in detail.  He understands and would like to proceed.    Total encounter time 30 minutes, more than half spent in counseling, review of data, and coordination of care.

## 2024-12-10 ENCOUNTER — OFFICE VISIT (OUTPATIENT)
Dept: URGENT CARE | Facility: URGENT CARE | Age: 49
End: 2024-12-10
Payer: COMMERCIAL

## 2024-12-10 VITALS
DIASTOLIC BLOOD PRESSURE: 84 MMHG | OXYGEN SATURATION: 97 % | SYSTOLIC BLOOD PRESSURE: 126 MMHG | BODY MASS INDEX: 35.14 KG/M2 | TEMPERATURE: 101 F | WEIGHT: 251 LBS | HEART RATE: 92 BPM | HEIGHT: 71 IN

## 2024-12-10 DIAGNOSIS — R07.0 THROAT PAIN: Primary | ICD-10-CM

## 2024-12-10 DIAGNOSIS — J03.90 TONSILLITIS: ICD-10-CM

## 2024-12-10 DIAGNOSIS — R50.9 FEVER IN ADULT: ICD-10-CM

## 2024-12-10 LAB
DEPRECATED S PYO AG THROAT QL EIA: NEGATIVE
GROUP A STREP BY PCR: NOT DETECTED

## 2024-12-10 PROCEDURE — 87651 STREP A DNA AMP PROBE: CPT | Performed by: FAMILY MEDICINE

## 2024-12-10 PROCEDURE — 99213 OFFICE O/P EST LOW 20 MIN: CPT | Performed by: FAMILY MEDICINE

## 2024-12-10 ASSESSMENT — PAIN SCALES - GENERAL: PAINLEVEL_OUTOF10: MILD PAIN (3)

## 2024-12-10 NOTE — PATIENT INSTRUCTIONS
Discharge instructions from Dr Gusman:    Today you were seen for:  tonsillitis     The Plan for you to get better is :  Complete the course of the antibiotic continuing gargling with warm salt water push more fluids.  Tylenol every 4-6 hours for pain  What do I do if this does not change or fails to improve? :    Symptoms do not improve in 3 to 4 days to follow-up     What to do if I am really worse? :    If you feel you are woserning with life threatening symptoms such as: a very fast heart rate difficulty breathing, increasing confusion, uncontrolled pain, the inability to keep any solids or liquids down, a failure to urinate or other abnormal and worsening symptoms go to an emergency room immediately.        Where are my test results if they were not back when I was discharged? :      Test results done in Urgent Care are released automatically as soon as they are resulted via 800APP. There are some instances when we call you with positive same day results (strep, covid, They may also be mailed to you if you do not have my chart.        Your health is important to all of us here at [facility] and the Urgent Care and we appreciate your trust and partnership in your return to health here today.

## 2024-12-10 NOTE — PROGRESS NOTES
Chief Complaint   Patient presents with    Urgent Care     Pt in clinic to have eval for sore throat, back pain, swollen lymph nodes, headache and fever.    Pain    Throat Pain       Cristo was seen today for urgent care, pain and throat pain.    Diagnoses and all orders for this visit:    Throat pain  -     Streptococcus A Rapid Screen w/Reflex to PCR - Clinic Collect  -     Group A Streptococcus PCR Throat Swab    Fever in adult    Tonsillitis  -     amoxicillin-clavulanate (AUGMENTIN) 875-125 MG tablet; Take 1 tablet by mouth 2 times daily.    Patient presents with sore throat and clinical evidence of pharyngitis.  The rapid strep test is negative, however, clinically the patient's symptoms are consistent with streptococcal pharyngitis.  Therefore I have recommended treatment with antibiotics and analgesics.  Patient does not have a concurrent URI, making viral etiologies less likely. If rashes appear and any difficulty breathing the patient will stop the medication and go to the ER immediately.  I see no clinical evidence of  peritonsillar abscess, retropharyngeal abscess, to   ER if increasing pain, change in voice, neck pain, vomiting, fever, or shortness of breath. Follow-up with primary physician if not improving in 3-5 days.  Patient verbalized understanding and agreement with the plan.  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  Onset of symptoms was 2 day(s) ago.    Course of illness: sudden onset.  Severity moderate  Current and Associated symptoms: fever, sore throat, headache, and back pain   Treatment measures tried include Tylenol/Ibuprofen.  Predisposing  "factors include None.    Past Medical History:   Diagnosis Date    HTN, goal below 140/90 11/17/2023    Other pulmonary embolism and infarction 9/2001    Pt was on coumadin for 5 years    Thrombosed hemorrhoids      Current Outpatient Medications   Medication Sig Dispense Refill    amoxicillin-clavulanate (AUGMENTIN) 875-125 MG tablet Take 1 tablet by mouth 2 times daily. 20 tablet 0    lisinopril (ZESTRIL) 10 MG tablet TAKE 1 TABLET AT BEDTIME 90 tablet 3    VITAMIN D PO Take by mouth.      XARELTO ANTICOAGULANT 10 MG TABS tablet TAKE 1 TABLET DAILY WITH   DINNER 90 tablet 3     Social History     Tobacco Use    Smoking status: Never     Passive exposure: Never    Smokeless tobacco: Never   Substance Use Topics    Alcohol use: Yes     Alcohol/week: 10.0 standard drinks of alcohol     Types: 10 Cans of beer per week       ROS:  Review of systems negative except as stated above.    OBJECTIVE:   /84   Pulse 92   Temp (!) 101  F (38.3  C) (Oral)   Ht 1.791 m (5' 10.5\")   Wt 113.9 kg (251 lb)   SpO2 97%   BMI 35.51 kg/m    GENERAL APPEARANCE: healthy, alert and no distress  EYES: EOMI,  PERRL, conjunctiva clear  HENT: ear canals and TM's normal.  Nose normal.  Pharynx erythematous with tonsillar enlargement on the right with some exudate noted.  NECK: supple, non-tender to palpation, no adenopathy noted  RESP: lungs clear to auscultation - no rales, rhonchi or wheezes  CV: regular rates and rhythm, normal S1 S2, no murmur noted  Back -no CVA tenderness noted  ABDOMEN:  soft, nontender, no HSM or masses and bowel sounds normal  PSYCH: mentation appears normal    Sumaya Gusman MD     "

## 2024-12-12 ENCOUNTER — OFFICE VISIT (OUTPATIENT)
Dept: FAMILY MEDICINE | Facility: CLINIC | Age: 49
End: 2024-12-12
Payer: COMMERCIAL

## 2024-12-12 VITALS
BODY MASS INDEX: 34.69 KG/M2 | RESPIRATION RATE: 16 BRPM | DIASTOLIC BLOOD PRESSURE: 80 MMHG | SYSTOLIC BLOOD PRESSURE: 122 MMHG | WEIGHT: 247.8 LBS | HEIGHT: 71 IN | OXYGEN SATURATION: 99 % | TEMPERATURE: 97.6 F | HEART RATE: 97 BPM

## 2024-12-12 DIAGNOSIS — J02.0 STREPTOCOCCAL PHARYNGITIS: Primary | ICD-10-CM

## 2024-12-12 DIAGNOSIS — E66.01 MORBID OBESITY (H): ICD-10-CM

## 2024-12-12 ASSESSMENT — PAIN SCALES - GENERAL: PAINLEVEL_OUTOF10: NO PAIN (1)

## 2024-12-12 NOTE — PROGRESS NOTES
"  Assessment & Plan     Streptococcal pharyngitis  Patient still does have moderate swelling and exudate, worse on the right tonsil but patient has significantly improved antibiotic therapy.  No signs of retropharyngeal or peritonsillar abscess.  Vital signs stable and exam otherwise unremarkable.  Recommended patient finish course of Augmentin that is prescribed, finishing entire 10-day prescription.  Otherwise, discussed supportive cares including Tylenol (he cannot take ibuprofen), lozenges, salt water gargles.  If he has any worsening symptoms or symptoms do not fully resolve with this plan, can return for reevaluation.        Morbid obesity (H)  BMI 35; continues to work on healthy lifestyle changes.     Danny Lemons is a 49 year old, presenting for the following health issues:  Throat Problem      12/12/2024    10:38 AM   Additional Questions   Roomed by Trisha mata     History of Present Illness       Headaches:   Since the patient's last clinic visit, headaches are: no change  The patient is getting headaches:  Never usually  He is able to do normal daily activities when he has a migraine.  The patient is taking the following rescue/relief medications:  Tylenol   Patient states \"I get total relief\" from the rescue/relief medications.   The patient is taking the following medications to prevent migraines:  No medications to prevent migraines  In the past 4 weeks, the patient has gone to an Urgent Care or Emergency Room 0 times times due to headaches.    Reason for visit:  Painful swollen lymph nodes in throat and high fever  Symptom onset:  1-3 days ago  Symptoms include:  Painful swollen lymph nodes in throat with a fever of 101-102. Slight headache and slight lower back pain  Symptom intensity:  Moderate  Symptom progression:  Staying the same  Had these symptoms before:  No  What makes it worse:  No  What makes it better:  Tylenol   He is taking medications regularly.     Seen in Urgent Care on 12/10 " "for sore throat. Rapid strep negative; but clinician highly suspicious for strep pharyngitis so started on Augmentin BID x10 days.   Since Urgent Care visit; fever, swollen lymph nodes, and sore throat is much improved, but he still had low-grade fever this AM and swollen right lymph node and sore throat although better.   He is leaving for [place] on Saturday and worried that it could get worse while out of town.   Otherwise asymptomatic and doing well.                   Objective    /80 (BP Location: Right arm, Patient Position: Sitting, Cuff Size: Adult Large)   Pulse 97   Temp 97.6  F (36.4  C) (Temporal)   Resp 16   Ht 1.791 m (5' 10.5\")   Wt 112.4 kg (247 lb 12.8 oz)   SpO2 99%   BMI 35.05 kg/m    Body mass index is 35.05 kg/m .  Physical Exam   GENERAL: alert and no distress  EYES: Eyes grossly normal to inspection, PERRL and conjunctivae and sclerae normal  HENT: ear canals and TM's normal, nose and mouth without ulcers or lesions but moderate swelling of tonsil with exudate worse on right side. Uvula mid-line with no apparent abscess.   NECK: no adenopathy, no asymmetry, masses, or scars  RESP: lungs clear to auscultation - no rales, rhonchi or wheezes  CV: regular rate and rhythm, normal S1 S2, no S3 or S4, no murmur, click or rub, no peripheral edema  ABDOMEN: soft, nontender, no hepatosplenomegaly, no masses and bowel sounds normal  SKIN: no suspicious lesions or rashes  NEURO: Normal strength and tone, mentation intact and speech normal  PSYCH: mentation appears normal, affect normal/bright            Signed Electronically by: Babar Sigala PA-C    "

## 2024-12-16 ENCOUNTER — OFFICE VISIT (OUTPATIENT)
Dept: URGENT CARE | Facility: URGENT CARE | Age: 49
End: 2024-12-16
Payer: COMMERCIAL

## 2024-12-16 VITALS
SYSTOLIC BLOOD PRESSURE: 138 MMHG | OXYGEN SATURATION: 98 % | RESPIRATION RATE: 16 BRPM | DIASTOLIC BLOOD PRESSURE: 84 MMHG | HEART RATE: 88 BPM | TEMPERATURE: 99.4 F

## 2024-12-16 DIAGNOSIS — J02.9 SORE THROAT: ICD-10-CM

## 2024-12-16 DIAGNOSIS — B27.90 INFECTIOUS MONONUCLEOSIS WITHOUT COMPLICATION, INFECTIOUS MONONUCLEOSIS DUE TO UNSPECIFIED ORGANISM: Primary | ICD-10-CM

## 2024-12-16 LAB
CRP SERPL-MCNC: 9.88 MG/L
ERYTHROCYTE [DISTWIDTH] IN BLOOD BY AUTOMATED COUNT: 12.8 % (ref 10–15)
HCT VFR BLD AUTO: 43 % (ref 40–53)
HGB BLD-MCNC: 13.9 G/DL (ref 13.3–17.7)
MCH RBC QN AUTO: 26.9 PG (ref 26.5–33)
MCHC RBC AUTO-ENTMCNC: 32.3 G/DL (ref 31.5–36.5)
MCV RBC AUTO: 83 FL (ref 78–100)
MONOCYTES NFR BLD AUTO: POSITIVE %
PLATELET # BLD AUTO: 227 10E3/UL (ref 150–450)
RBC # BLD AUTO: 5.16 10E6/UL (ref 4.4–5.9)
WBC # BLD AUTO: 8.5 10E3/UL (ref 4–11)

## 2024-12-16 PROCEDURE — 85027 COMPLETE CBC AUTOMATED: CPT | Performed by: PHYSICIAN ASSISTANT

## 2024-12-16 PROCEDURE — 99214 OFFICE O/P EST MOD 30 MIN: CPT | Performed by: PHYSICIAN ASSISTANT

## 2024-12-16 PROCEDURE — 86308 HETEROPHILE ANTIBODY SCREEN: CPT | Performed by: PHYSICIAN ASSISTANT

## 2024-12-16 PROCEDURE — 86140 C-REACTIVE PROTEIN: CPT | Performed by: PHYSICIAN ASSISTANT

## 2024-12-16 PROCEDURE — 36415 COLL VENOUS BLD VENIPUNCTURE: CPT | Performed by: PHYSICIAN ASSISTANT

## 2024-12-16 NOTE — PROGRESS NOTES
SUBJECTIVE:  Cristo Dong is a 49 year old male with a chief complaint of sore throat.  Onset of symptoms was 10 day(s) ago.    Course of illness: still present.  Severity moderate  Current and Associated symptoms: fever, chills, sore throat, and fatigue  Treatment measures tried include Tylenol/Ibuprofen, Fluids, and Rest.  Predisposing factors include None.    Past Medical History:   Diagnosis Date    HTN, goal below 140/90 11/17/2023    Other pulmonary embolism and infarction 9/2001    Pt was on coumadin for 5 years    Thrombosed hemorrhoids      Current Outpatient Medications   Medication Sig Dispense Refill    cefpodoxime (VANTIN) 200 MG tablet Take 1 tablet (200 mg) by mouth 2 times daily for 10 days. 20 tablet 0    lisinopril (ZESTRIL) 10 MG tablet TAKE 1 TABLET AT BEDTIME 90 tablet 3    VITAMIN D PO Take by mouth.      XARELTO ANTICOAGULANT 10 MG TABS tablet TAKE 1 TABLET DAILY WITH   DINNER 90 tablet 3    amoxicillin-clavulanate (AUGMENTIN) 875-125 MG tablet Take 1 tablet by mouth 2 times daily. (Patient not taking: Reported on 12/16/2024) 20 tablet 0     Social History     Tobacco Use    Smoking status: Never     Passive exposure: Never    Smokeless tobacco: Never   Substance Use Topics    Alcohol use: Yes     Alcohol/week: 10.0 standard drinks of alcohol     Types: 10 Cans of beer per week       ROS:  Review of systems negative except as stated above.    OBJECTIVE:   /84 (BP Location: Right arm)   Pulse 88   Temp 99.4  F (37.4  C) (Oral)   Resp 16   SpO2 98%   GENERAL APPEARANCE: healthy, alert and no distress  EYES: EOMI,  PERRL, conjunctiva clear  HENT: ear canals and TM's normal.  Nose normal.  Pharynx erythematous with some exudate noted.  NECK: supple, non-tender to palpation, no adenopathy noted  RESP: lungs clear to auscultation - no rales, rhonchi or wheezes  CV: regular rates and rhythm, normal S1 S2, no murmur noted  SKIN: no suspicious lesions or rashes    Results for orders placed  or performed in visit on 12/16/24   CBC with platelets     Status: Normal   Result Value Ref Range    WBC Count 8.5 4.0 - 11.0 10e3/uL    RBC Count 5.16 4.40 - 5.90 10e6/uL    Hemoglobin 13.9 13.3 - 17.7 g/dL    Hematocrit 43.0 40.0 - 53.0 %    MCV 83 78 - 100 fL    MCH 26.9 26.5 - 33.0 pg    MCHC 32.3 31.5 - 36.5 g/dL    RDW 12.8 10.0 - 15.0 %    Platelet Count 227 150 - 450 10e3/uL   Mononucleosis screen     Status: Abnormal   Result Value Ref Range    Mononucleosis Screen Positive (A) Negative         ASSESSMENT:  (B27.90) Infectious mononucleosis without complication, infectious mononucleosis due to unspecified organism  (primary encounter diagnosis)  (J02.9) Sore throat  Plan: CBC with platelets, CRP, inflammation,         Mononucleosis screen          Red flags and follow up discussed  Abstain from risks to injury for 30 days

## 2024-12-30 ENCOUNTER — MYC MEDICAL ADVICE (OUTPATIENT)
Dept: FAMILY MEDICINE | Facility: CLINIC | Age: 49
End: 2024-12-30
Payer: COMMERCIAL

## 2025-02-10 ENCOUNTER — HOSPITAL ENCOUNTER (OUTPATIENT)
Facility: CLINIC | Age: 50
Discharge: HOME | End: 2025-02-10
Attending: SURGERY | Admitting: SURGERY
Payer: COMMERCIAL

## 2025-02-10 VITALS
TEMPERATURE: 97.8 F | BODY MASS INDEX: 35.36 KG/M2 | WEIGHT: 252.6 LBS | RESPIRATION RATE: 14 BRPM | OXYGEN SATURATION: 97 % | DIASTOLIC BLOOD PRESSURE: 98 MMHG | SYSTOLIC BLOOD PRESSURE: 139 MMHG | HEIGHT: 71 IN | HEART RATE: 68 BPM

## 2025-02-10 DIAGNOSIS — G89.18 ACUTE POST-OPERATIVE PAIN: Primary | ICD-10-CM

## 2025-02-10 PROCEDURE — 999N000141 HC STATISTIC PRE-PROCEDURE NURSING ASSESSMENT: Performed by: SURGERY

## 2025-02-10 PROCEDURE — 250N000009 HC RX 250: Performed by: SURGERY

## 2025-02-10 PROCEDURE — 11423 EXC H-F-NK-SP B9+MARG 2.1-3: CPT | Performed by: SURGERY

## 2025-02-10 PROCEDURE — 710N000009 HC RECOVERY PHASE 1, LEVEL 1, PER MIN: Performed by: SURGERY

## 2025-02-10 PROCEDURE — 272N000001 HC OR GENERAL SUPPLY STERILE: Performed by: SURGERY

## 2025-02-10 PROCEDURE — 710N000012 HC RECOVERY PHASE 2, PER MINUTE: Performed by: SURGERY

## 2025-02-10 PROCEDURE — 360N000075 HC SURGERY LEVEL 2, PER MIN: Performed by: SURGERY

## 2025-02-10 RX ORDER — AMOXICILLIN 250 MG
1 CAPSULE ORAL 2 TIMES DAILY
Qty: 15 TABLET | Refills: 0 | Status: SHIPPED | OUTPATIENT
Start: 2025-02-10 | End: 2025-02-10

## 2025-02-10 RX ORDER — HYDROCODONE BITARTRATE AND ACETAMINOPHEN 5; 325 MG/1; MG/1
1 TABLET ORAL
Status: CANCELLED | OUTPATIENT
Start: 2025-02-10

## 2025-02-10 RX ORDER — HYDROCODONE BITARTRATE AND ACETAMINOPHEN 5; 325 MG/1; MG/1
1 TABLET ORAL EVERY 4 HOURS PRN
Qty: 5 TABLET | Refills: 0 | Status: SHIPPED | OUTPATIENT
Start: 2025-02-10

## 2025-02-10 RX ORDER — LIDOCAINE HYDROCHLORIDE 10 MG/ML
INJECTION, SOLUTION EPIDURAL; INFILTRATION; INTRACAUDAL; PERINEURAL
Status: DISCONTINUED
Start: 2025-02-10 | End: 2025-02-10 | Stop reason: HOSPADM

## 2025-02-10 RX ORDER — HYDROCODONE BITARTRATE AND ACETAMINOPHEN 5; 325 MG/1; MG/1
1 TABLET ORAL EVERY 4 HOURS PRN
Qty: 5 TABLET | Refills: 0 | Status: SHIPPED | OUTPATIENT
Start: 2025-02-10 | End: 2025-02-10

## 2025-02-10 RX ORDER — BUPIVACAINE HYDROCHLORIDE AND EPINEPHRINE 2.5; 5 MG/ML; UG/ML
INJECTION, SOLUTION EPIDURAL; INFILTRATION; INTRACAUDAL; PERINEURAL
Status: DISCONTINUED
Start: 2025-02-10 | End: 2025-02-10 | Stop reason: HOSPADM

## 2025-02-10 RX ORDER — GINSENG 100 MG
CAPSULE ORAL
Status: DISCONTINUED
Start: 2025-02-10 | End: 2025-02-10 | Stop reason: HOSPADM

## 2025-02-10 ASSESSMENT — ACTIVITIES OF DAILY LIVING (ADL)
ADLS_ACUITY_SCORE: 15
ADLS_ACUITY_SCORE: 15
ADLS_ACUITY_SCORE: 41

## 2025-02-10 NOTE — DISCHARGE INSTRUCTIONS
[facility]  Discharge Instructions: Post-Operative Excision of Mass or Lesion    ACTIVITY  Take frequent, short walks and increase your activity gradually.    Avoid strenuous physical activity or heavy lifting greater than 15-20 lbs. for 1-2 weeks.  You may climb stairs.  You may drive without restrictions when you are not using any prescription pain medication and feel comfortable in a car.  You may return to work/school when you are comfortable without any prescription pain medication.    WOUND CARE  You may remove your outer dressing or Band-Aids and shower 48 hours after the surgery.  Pat your incisions dry and leave them open to air.  Re-apply dressing (Band-Aids or gauze/tape) as needed for comfort or drainage.  You may have steri-strips (looks like white tape) or skin glue on your incisions.  You may peel off the steri-strips 2 weeks after your surgery if they have not peeled off on their own.  If you have skin glue, it will peel up and fall off on its own.  Do not soak your incisions in a tub or pool for 2 weeks.   Do not apply any lotions, creams, or ointments to your incision(s).  A ridge under your incision(s) is normal and will gradually resolve.    DIET  Start with liquids, then gradually resume your regular diet as tolerated.   Drink plenty of liquids to stay hydrated.    PAIN  Expect some tenderness and discomfort at the incision site(s).  Use the prescribed pain medication at your discretion.  Expect gradual resolution of your pain over several days.  You may take ibuprofen with food (unless you have been told not to) or acetaminophen/Tylenol instead of or in addition to your prescribed pain medication.  However, if you are taking Norco or Percocet, do not take any additional acetaminophen/Tylenol.  Do not drink alcohol or drive while you are taking pain medications.  You may apply ice to your incisions in 20 minute intervals as needed for the next 48 hours.  After that  time, consider switching to heat if you prefer.    EXPECTATIONS  Pain medications can cause constipation.  Limit use when possible.  Take an over the counter or prescribed stool softener/stimulant, such as Colace or Senna, 1-2 times a day with plenty of water.  You may take a mild over the counter laxative, such as Miralax or a suppository, as needed.    You may discontinue these medications once you are having regular bowel movements and/or are no longer taking your narcotic pain medication.    FOLLOW UP  Please call us at [phone] to schedule a post op appointment for ~10 days after surgery (next Wed ok with Dr. Christensen, or with a PA on TH/F next week).    We are located at [address] [place] [address].    CALL OUR OFFICE -[phone] IF YOU HAVE:   Chills or fever above 101 F.  Increased redness, warmth, or drainage at your incisions.  Significant bleeding.  Pain not relieved by your pain medication or rest.  Increasing pain after the first 48 hours.  Any other concerns or questions.             Revised December 2021

## 2025-02-10 NOTE — OR NURSING
Discharge instructions reviewed with patient. Printed form sent home with patient as well as [place] prescription. Staples intact on incision line. Bacitracin ointment sent with patient. He has no further questions.VSS

## 2025-02-10 NOTE — BRIEF OP NOTE
[facility]    Brief Operative Note    Pre-operative diagnosis: Sebaceous cyst [L72.3]  Post-operative diagnosis Scalp sebaceous cyst    Procedure: EXCISION, CYST ON SCALP, N/A - Head    Surgeon: Surgeons and Role:     * Ludwig Christensen MD - Primary     * Jassi Dong PA-C - Assisting    Anesthesia: Local   Estimated Blood Loss: Less than 10 ml    Drains: None  Specimens:   ID Type Source Tests Collected by Time Destination   1 : scalp Tissue Scalp SURGICAL PATHOLOGY EXAM Ludwig Christensen MD 2/10/2025 12:28 PM      Findings:   Sebaceous cyst ruptured with handling.  Full of sebum.  Irrigated copiously and stapled shut .  Complications: None.  Implants: * No implants in log *      Biju Dong PA-C  [phone]

## 2025-02-11 NOTE — OP NOTE
Preoperative diagnosis: Scalp cyst  Postoperative diagnosis: Scalp inclusion cyst (2 x 3 cm)  Operative procedure: Excision of scalp inclusion cyst, 2 x 3 cm.  Surgeon:  Ludwig Christensen M.D.  Assistant: Jassi Dong PA-C, The physicians assistant was medically necessary for their expertise in hemostasis, suturing, and retraction.    Anesthesia:  Local  EBL: Minimal  Events:  The procedure was started with Cristo Dong in the recumbent position, his scalp was prepped and draped in the usual sterile fashion.  A generous amount of local anesthetic was utilized in the area of concern.  Incision was made sharply, electrocautery and blunt dissection down to and around the inclusion cyst which was fully excised.  The cavity was explored and no evidence of additional pathology noted.  Hemostasis was secured.  The wound was closed with staples.  All counts correct.  No immediate complications.

## 2025-02-19 ENCOUNTER — OFFICE VISIT (OUTPATIENT)
Dept: SURGERY | Facility: CLINIC | Age: 50
End: 2025-02-19
Payer: COMMERCIAL

## 2025-02-19 DIAGNOSIS — L72.3 SEBACEOUS CYST: Primary | ICD-10-CM

## 2025-02-21 NOTE — PROGRESS NOTES
General Surgery clinic    Postop follow-up for removal of staples.  He had an inclusion cyst removed from his scalp last week.  He has healed nicely.  Pain was minimal.    On exam incision has healed completely.  Staples were removed without problem.    He is surgically dismissed, follow-up as needed.

## 2025-02-27 ENCOUNTER — TELEPHONE (OUTPATIENT)
Dept: SURGERY | Facility: CLINIC | Age: 50
End: 2025-02-27
Payer: COMMERCIAL

## 2025-02-27 NOTE — TELEPHONE ENCOUNTER
[facility]  Post op call note - No Answer    Cristo Dong was called for an update regarding his recovery.  There was no answer and a message was left instructing the patient to call the office with any questions or concerns.     Jassi Dong PA-C

## 2025-04-12 DIAGNOSIS — I82.409 RECURRENT DEEP VEIN THROMBOSIS (DVT) (H): ICD-10-CM

## 2025-04-12 DIAGNOSIS — I10 HTN, GOAL BELOW 140/90: ICD-10-CM

## 2025-04-13 RX ORDER — RIVAROXABAN 10 MG/1
10 TABLET, FILM COATED ORAL
Qty: 90 TABLET | Refills: 0 | Status: SHIPPED | OUTPATIENT
Start: 2025-04-13

## 2025-04-15 RX ORDER — LISINOPRIL 10 MG/1
10 TABLET ORAL AT BEDTIME
Qty: 90 TABLET | Refills: 3 | Status: SHIPPED | OUTPATIENT
Start: 2025-04-15

## 2025-05-19 ENCOUNTER — RESULTS FOLLOW-UP (OUTPATIENT)
Dept: FAMILY MEDICINE | Facility: CLINIC | Age: 50
End: 2025-05-19

## 2025-05-19 ENCOUNTER — OFFICE VISIT (OUTPATIENT)
Dept: FAMILY MEDICINE | Facility: CLINIC | Age: 50
End: 2025-05-19
Payer: COMMERCIAL

## 2025-05-19 VITALS
SYSTOLIC BLOOD PRESSURE: 122 MMHG | OXYGEN SATURATION: 100 % | HEART RATE: 64 BPM | BODY MASS INDEX: 36.65 KG/M2 | DIASTOLIC BLOOD PRESSURE: 80 MMHG | TEMPERATURE: 97 F | HEIGHT: 70 IN | WEIGHT: 256 LBS | RESPIRATION RATE: 16 BRPM

## 2025-05-19 DIAGNOSIS — J06.9 VIRAL UPPER RESPIRATORY TRACT INFECTION: Primary | ICD-10-CM

## 2025-05-19 DIAGNOSIS — B27.90 INFECTIOUS MONONUCLEOSIS WITHOUT COMPLICATION, INFECTIOUS MONONUCLEOSIS DUE TO UNSPECIFIED ORGANISM: Primary | ICD-10-CM

## 2025-05-19 DIAGNOSIS — Z86.19 HX OF INFECTIOUS MONONUCLEOSIS: ICD-10-CM

## 2025-05-19 LAB
BASOPHILS # BLD AUTO: 0 10E3/UL (ref 0–0.2)
BASOPHILS NFR BLD AUTO: 1 %
CRP SERPL-MCNC: <3 MG/L
DEPRECATED S PYO AG THROAT QL EIA: NEGATIVE
EOSINOPHIL # BLD AUTO: 0.1 10E3/UL (ref 0–0.7)
EOSINOPHIL NFR BLD AUTO: 3 %
ERYTHROCYTE [DISTWIDTH] IN BLOOD BY AUTOMATED COUNT: 12.8 % (ref 10–15)
HCT VFR BLD AUTO: 44.3 % (ref 40–53)
HGB BLD-MCNC: 14.5 G/DL (ref 13.3–17.7)
IMM GRANULOCYTES # BLD: 0 10E3/UL
IMM GRANULOCYTES NFR BLD: 0 %
LYMPHOCYTES # BLD AUTO: 2 10E3/UL (ref 0.8–5.3)
LYMPHOCYTES NFR BLD AUTO: 46 %
MCH RBC QN AUTO: 27.2 PG (ref 26.5–33)
MCHC RBC AUTO-ENTMCNC: 32.7 G/DL (ref 31.5–36.5)
MCV RBC AUTO: 83 FL (ref 78–100)
MONOCYTES # BLD AUTO: 0.5 10E3/UL (ref 0–1.3)
MONOCYTES NFR BLD AUTO: 13 %
MONOCYTES NFR BLD AUTO: POSITIVE %
NEUTROPHILS # BLD AUTO: 1.6 10E3/UL (ref 1.6–8.3)
NEUTROPHILS NFR BLD AUTO: 37 %
PLATELET # BLD AUTO: 237 10E3/UL (ref 150–450)
RBC # BLD AUTO: 5.34 10E6/UL (ref 4.4–5.9)
S PYO DNA THROAT QL NAA+PROBE: NOT DETECTED
WBC # BLD AUTO: 4.3 10E3/UL (ref 4–11)

## 2025-05-19 PROCEDURE — 3079F DIAST BP 80-89 MM HG: CPT

## 2025-05-19 PROCEDURE — 87651 STREP A DNA AMP PROBE: CPT

## 2025-05-19 PROCEDURE — 36415 COLL VENOUS BLD VENIPUNCTURE: CPT

## 2025-05-19 PROCEDURE — G2211 COMPLEX E/M VISIT ADD ON: HCPCS

## 2025-05-19 PROCEDURE — 85025 COMPLETE CBC W/AUTO DIFF WBC: CPT

## 2025-05-19 PROCEDURE — 1126F AMNT PAIN NOTED NONE PRSNT: CPT

## 2025-05-19 PROCEDURE — 3074F SYST BP LT 130 MM HG: CPT

## 2025-05-19 PROCEDURE — 99214 OFFICE O/P EST MOD 30 MIN: CPT

## 2025-05-19 PROCEDURE — 86140 C-REACTIVE PROTEIN: CPT

## 2025-05-19 PROCEDURE — 86308 HETEROPHILE ANTIBODY SCREEN: CPT

## 2025-05-19 ASSESSMENT — PAIN SCALES - GENERAL: PAINLEVEL_OUTOF10: NO PAIN (0)

## 2025-05-19 NOTE — PROGRESS NOTES
Assessment & Plan     Viral upper respiratory tract infection  Hx of infectious mononucleosis  Discussed he had mono back in Dec, typically has antibodies for life- would be very unlikely to get again so soon. He strongly desires re-testing, which is again positive- though this can remain positive for up to a year. So can also obtain EBV-IgG and IgM to determine past vs acute infection. CRP also pending  CBC normal, strep negative - culture is pending.   To be safe, recommend avoiding strenuous activity/heavy lifting/ contact sports for 4 weeks. Tylenol as needed for pain, cepacol lozenges can also help sore throat.   - Mononucleosis screen  - CRP, inflammation  - CBC with platelets and differential  - Streptococcus A Rapid Screen w/Reflex to PCR - Clinic Collect  - Mononucleosis screen  - CRP, inflammation  - CBC with platelets and differential  - Group A Streptococcus PCR Throat Swab        Danny Lemons is a 50 year old, presenting for the following health issues:  URI (Mono? )        5/19/2025     8:35 AM   Additional Questions   Roomed by Marielena ZAMORA    History of Present Illness       Reason for visit:  I think my mono has reactivated  Symptoms include:  Runny nose, congestion, fatigue, low fever, aches  Symptom intensity:  Mild  Symptom progression:  Improving  Had these symptoms before:  Yes  Has tried/received treatment for these symptoms:  Yes  Previous treatment was successful:  No  What makes it worse:  No  What makes it better:  Tylenol   He is taking medications regularly.      Pt here with URI symptoms that started Thursday 5/15- symptoms seem like what he had when he had Mono back in Dec  Nasal congestion, fatigue, some body aches/pains, mild sore throat.    Not coughing, no fevers (99.1 the other day), no shortness of breath    Hx of two pulmonary embolisms, on xarelto.        Objective    /80 (BP Location: Right arm, Patient Position: Sitting, Cuff Size: Adult Large)   Pulse  "64   Temp 97  F (36.1  C) (Temporal)   Resp 16   Ht 1.778 m (5' 10\")   Wt 116.1 kg (256 lb)   SpO2 100%   BMI 36.73 kg/m    Body mass index is 36.73 kg/m .  Physical Exam   GENERAL: alert and no distress  EYES: Eyes grossly normal to inspection, PERRL and conjunctivae and sclerae normal  HENT: mild tonsillar exudate, no tonsillar hypertrophy or posterior oropharynx erythema. ear canals and TM's normal  ABDOMEN: soft, nontender, no hepatosplenomegaly, no masses and bowel sounds normal  PSYCH: mentation appears normal, affect normal/bright    Results for orders placed or performed in visit on 05/19/25 (from the past 24 hours)   Streptococcus A Rapid Screen w/Reflex to PCR - Clinic Collect    Specimen: Throat; Swab   Result Value Ref Range    Group A Strep antigen Negative Negative   Mononucleosis screen   Result Value Ref Range    Mononucleosis Screen Positive (A) Negative   CBC with platelets and differential    Narrative    The following orders were created for panel order CBC with platelets and differential.  Procedure                               Abnormality         Status                     ---------                               -----------         ------                     CBC with platelets and ...[[id number]]                      Final result                 Please view results for these tests on the individual orders.   CBC with platelets and differential   Result Value Ref Range    WBC Count 4.3 4.0 - 11.0 10e3/uL    RBC Count 5.34 4.40 - 5.90 10e6/uL    Hemoglobin 14.5 13.3 - 17.7 g/dL    Hematocrit 44.3 40.0 - 53.0 %    MCV 83 78 - 100 fL    MCH 27.2 26.5 - 33.0 pg    MCHC 32.7 31.5 - 36.5 g/dL    RDW 12.8 10.0 - 15.0 %    Platelet Count 237 150 - 450 10e3/uL    % Neutrophils 37 %    % Lymphocytes 46 %    % Monocytes 13 %    % Eosinophils 3 %    % Basophils 1 %    % Immature Granulocytes 0 %    Absolute Neutrophils 1.6 1.6 - 8.3 10e3/uL    Absolute Lymphocytes 2.0 0.8 - 5.3 10e3/uL    Absolute " Monocytes 0.5 0.0 - 1.3 10e3/uL    Absolute Eosinophils 0.1 0.0 - 0.7 10e3/uL    Absolute Basophils 0.0 0.0 - 0.2 10e3/uL    Absolute Immature Granulocytes 0.0 <=0.4 10e3/uL           Signed Electronically by: EMMA Lynch CNP

## 2025-05-23 NOTE — TELEPHONE ENCOUNTER
HI, welcome home!     I refilled your Xarelto, so you should be able to pick this up anytime.    Keep applying the heat to your arm, and let me know, I'm optimistic you won't need further antibiotics, but certainly contact me if symptoms reoccur.    I do think it's good for [facility] to know about the infection, but one problem is that we don't have a specific known offending organism. I don't know if they have a special board to track iatrogenic infections, but that is who would want to know. Otherwise, it can be really hard to get specific information to the inpatient physicians who saw you. All hospitals have a patient representative, so that might be the place to start.  
See My Chart message.  Also asking for a refill on Xarelto which is currently on med list only as historical  
[Negative] : Heme/Lymph

## (undated) DEVICE — GLOVE BIOGEL PI MICRO SZ 7.5 48575

## (undated) DEVICE — ESU PENCIL W/SMOKE EVAC NEPTUNE STRYKER 0703-046-000

## (undated) DEVICE — DRAPE LAP W/ARMBOARD 29410

## (undated) DEVICE — STPL SKIN 35W 6.9MM  PXW35

## (undated) DEVICE — TUBING SUCTION 12"X1/4" N612

## (undated) DEVICE — SUCTION MANIFOLD NEPTUNE 2 SYS 4 PORT 0702-020-000

## (undated) DEVICE — BAG DECANTER STERILE WHITE DYNJDEC09

## (undated) DEVICE — LINEN TOWEL PACK X5 5464

## (undated) DEVICE — SPECIMEN CONTAINER 3OZ W/FORMALIN 59901

## (undated) DEVICE — GOWN IMPERVIOUS 2XL BLUE

## (undated) DEVICE — SOL WATER IRRIG 1000ML BOTTLE 2F7114

## (undated) DEVICE — PREP CHLORAPREP 26ML TINTED HI-LITE ORANGE 930815

## (undated) DEVICE — PACK MINOR SBA15MIFSE

## (undated) DEVICE — SUCTION MANIFOLD NEPTUNE 2 SYS 1 PORT 702-025-000

## (undated) DEVICE — VIAL DECANTER STERILE WHITE DYNJDEC06

## (undated) DEVICE — SOL NACL 0.9% IRRIG 1000ML BOTTLE 2F7124

## (undated) DEVICE — GLOVE BIOGEL PI MICRO INDICATOR UNDERGLOVE SZ 7.5 48975

## (undated) DEVICE — ESU GROUND PAD UNIVERSAL W/O CORD

## (undated) RX ORDER — DIPHENHYDRAMINE HYDROCHLORIDE 50 MG/ML
INJECTION INTRAMUSCULAR; INTRAVENOUS
Status: DISPENSED
Start: 2021-01-22

## (undated) RX ORDER — FENTANYL CITRATE 50 UG/ML
INJECTION, SOLUTION INTRAMUSCULAR; INTRAVENOUS
Status: DISPENSED
Start: 2021-01-22